# Patient Record
Sex: FEMALE | Race: WHITE | Employment: OTHER
[De-identification: names, ages, dates, MRNs, and addresses within clinical notes are randomized per-mention and may not be internally consistent; named-entity substitution may affect disease eponyms.]

---

## 2017-01-04 ENCOUNTER — OFFICE VISIT (OUTPATIENT)
Dept: PAIN MANAGEMENT | Facility: CLINIC | Age: 62
End: 2017-01-04

## 2017-01-04 VITALS
BODY MASS INDEX: 31.04 KG/M2 | SYSTOLIC BLOOD PRESSURE: 174 MMHG | RESPIRATION RATE: 16 BRPM | HEIGHT: 61 IN | WEIGHT: 164.4 LBS | HEART RATE: 104 BPM | OXYGEN SATURATION: 98 % | DIASTOLIC BLOOD PRESSURE: 105 MMHG

## 2017-01-04 DIAGNOSIS — M54.12 CERVICAL RADICULAR PAIN: Chronic | ICD-10-CM

## 2017-01-04 DIAGNOSIS — Z79.891 CHRONIC USE OF OPIATE DRUGS THERAPEUTIC PURPOSES: ICD-10-CM

## 2017-01-04 DIAGNOSIS — M54.16 LUMBAR RADICULAR PAIN: Primary | Chronic | ICD-10-CM

## 2017-01-04 DIAGNOSIS — Z79.899 CHRONIC PRESCRIPTION BENZODIAZEPINE USE: ICD-10-CM

## 2017-01-04 PROCEDURE — 99214 OFFICE O/P EST MOD 30 MIN: CPT | Performed by: ANESTHESIOLOGY

## 2017-01-04 RX ORDER — HYDROCODONE BITARTRATE AND ACETAMINOPHEN 5; 325 MG/1; MG/1
1 TABLET ORAL EVERY 6 HOURS PRN
Qty: 120 TABLET | Refills: 0 | Status: SHIPPED | OUTPATIENT
Start: 2017-01-08 | End: 2017-02-07 | Stop reason: SDUPTHER

## 2017-01-04 RX ORDER — MELOXICAM 15 MG/1
TABLET ORAL
Qty: 30 TABLET | Refills: 2 | Status: SHIPPED | OUTPATIENT
Start: 2017-01-04 | End: 2017-03-20 | Stop reason: SDUPTHER

## 2017-01-04 ASSESSMENT — ENCOUNTER SYMPTOMS: BACK PAIN: 1

## 2017-01-12 ENCOUNTER — TELEPHONE (OUTPATIENT)
Dept: PAIN MANAGEMENT | Facility: CLINIC | Age: 62
End: 2017-01-12

## 2017-01-24 ENCOUNTER — OFFICE VISIT (OUTPATIENT)
Dept: FAMILY MEDICINE CLINIC | Facility: CLINIC | Age: 62
End: 2017-01-24

## 2017-01-24 VITALS
WEIGHT: 157 LBS | OXYGEN SATURATION: 98 % | HEART RATE: 90 BPM | DIASTOLIC BLOOD PRESSURE: 70 MMHG | SYSTOLIC BLOOD PRESSURE: 120 MMHG | BODY MASS INDEX: 29.66 KG/M2

## 2017-01-24 DIAGNOSIS — F41.9 ANXIETY: ICD-10-CM

## 2017-01-24 DIAGNOSIS — Z11.59 NEED FOR HEPATITIS C SCREENING TEST: ICD-10-CM

## 2017-01-24 DIAGNOSIS — Z00.00 HEALTH CARE MAINTENANCE: ICD-10-CM

## 2017-01-24 DIAGNOSIS — F32.89 OTHER DEPRESSION: Primary | ICD-10-CM

## 2017-01-24 DIAGNOSIS — M54.2 CERVICALGIA: Chronic | ICD-10-CM

## 2017-01-24 DIAGNOSIS — E78.5 DYSLIPIDEMIA: ICD-10-CM

## 2017-01-24 PROCEDURE — 99214 OFFICE O/P EST MOD 30 MIN: CPT | Performed by: NURSE PRACTITIONER

## 2017-01-24 ASSESSMENT — ENCOUNTER SYMPTOMS
DIARRHEA: 0
GASTROINTESTINAL NEGATIVE: 1
COUGH: 0
CONSTIPATION: 0
EYES NEGATIVE: 1
WHEEZING: 0
SHORTNESS OF BREATH: 0
ABDOMINAL PAIN: 0
BACK PAIN: 1
RESPIRATORY NEGATIVE: 1
ALLERGIC/IMMUNOLOGIC NEGATIVE: 1

## 2017-01-31 ENCOUNTER — TELEPHONE (OUTPATIENT)
Dept: FAMILY MEDICINE CLINIC | Facility: CLINIC | Age: 62
End: 2017-01-31

## 2017-01-31 RX ORDER — DOXYCYCLINE HYCLATE 100 MG/1
100 CAPSULE ORAL 2 TIMES DAILY
Qty: 20 CAPSULE | Refills: 0 | Status: SHIPPED | OUTPATIENT
Start: 2017-01-31 | End: 2017-02-10

## 2017-02-07 DIAGNOSIS — M54.16 LUMBAR RADICULAR PAIN: Chronic | ICD-10-CM

## 2017-02-07 RX ORDER — MELOXICAM 15 MG/1
TABLET ORAL
Qty: 30 TABLET | Refills: 2 | Status: CANCELLED | OUTPATIENT
Start: 2017-02-07

## 2017-02-07 RX ORDER — GABAPENTIN 300 MG/1
300 CAPSULE ORAL 3 TIMES DAILY
Qty: 90 CAPSULE | Refills: 2 | Status: SHIPPED | OUTPATIENT
Start: 2017-02-07 | End: 2017-05-03 | Stop reason: SDUPTHER

## 2017-02-07 RX ORDER — HYDROCODONE BITARTRATE AND ACETAMINOPHEN 5; 325 MG/1; MG/1
1 TABLET ORAL EVERY 6 HOURS PRN
Qty: 120 TABLET | Refills: 0 | Status: SHIPPED | OUTPATIENT
Start: 2017-02-08 | End: 2017-05-17 | Stop reason: SDUPTHER

## 2017-02-15 ENCOUNTER — TELEPHONE (OUTPATIENT)
Dept: FAMILY MEDICINE CLINIC | Facility: CLINIC | Age: 62
End: 2017-02-15

## 2017-02-16 ENCOUNTER — TELEPHONE (OUTPATIENT)
Dept: FAMILY MEDICINE CLINIC | Facility: CLINIC | Age: 62
End: 2017-02-16

## 2017-02-24 ENCOUNTER — TELEPHONE (OUTPATIENT)
Dept: FAMILY MEDICINE CLINIC | Facility: CLINIC | Age: 62
End: 2017-02-24

## 2017-03-06 RX ORDER — HYDROCODONE BITARTRATE AND ACETAMINOPHEN 5; 325 MG/1; MG/1
1 TABLET ORAL EVERY 6 HOURS PRN
Qty: 120 TABLET | Refills: 0 | Status: CANCELLED | OUTPATIENT
Start: 2017-03-06 | End: 2017-04-05

## 2017-03-21 RX ORDER — MELOXICAM 15 MG/1
TABLET ORAL
Qty: 30 TABLET | Refills: 2 | Status: SHIPPED | OUTPATIENT
Start: 2017-03-21 | End: 2017-03-22 | Stop reason: SDUPTHER

## 2017-03-22 RX ORDER — MELOXICAM 15 MG/1
TABLET ORAL
Qty: 30 TABLET | Refills: 3 | Status: SHIPPED | OUTPATIENT
Start: 2017-03-22 | End: 2017-06-13

## 2017-05-03 DIAGNOSIS — M54.16 LUMBAR RADICULAR PAIN: Chronic | ICD-10-CM

## 2017-05-04 RX ORDER — GABAPENTIN 300 MG/1
CAPSULE ORAL
Qty: 90 CAPSULE | Refills: 1 | Status: SHIPPED | OUTPATIENT
Start: 2017-05-04 | End: 2017-07-10 | Stop reason: SDUPTHER

## 2017-05-08 RX ORDER — BISACODYL 5 MG
TABLET, DELAYED RELEASE (ENTERIC COATED) ORAL
Qty: 60 TABLET | Refills: 5 | Status: SHIPPED | OUTPATIENT
Start: 2017-05-08 | End: 2017-08-17 | Stop reason: ALTCHOICE

## 2017-05-08 RX ORDER — DOCUSATE SODIUM 100 MG/1
CAPSULE ORAL
Qty: 60 CAPSULE | Refills: 5 | Status: SHIPPED | OUTPATIENT
Start: 2017-05-08 | End: 2017-08-17 | Stop reason: ALTCHOICE

## 2017-05-17 ENCOUNTER — OFFICE VISIT (OUTPATIENT)
Dept: FAMILY MEDICINE CLINIC | Age: 62
End: 2017-05-17
Payer: MEDICARE

## 2017-05-17 ENCOUNTER — OFFICE VISIT (OUTPATIENT)
Dept: PAIN MANAGEMENT | Age: 62
End: 2017-05-17
Payer: MEDICARE

## 2017-05-17 VITALS
RESPIRATION RATE: 20 BRPM | BODY MASS INDEX: 29.63 KG/M2 | SYSTOLIC BLOOD PRESSURE: 125 MMHG | HEIGHT: 62 IN | WEIGHT: 161 LBS | TEMPERATURE: 97.4 F | OXYGEN SATURATION: 95 % | HEART RATE: 99 BPM | DIASTOLIC BLOOD PRESSURE: 83 MMHG

## 2017-05-17 VITALS
HEART RATE: 105 BPM | SYSTOLIC BLOOD PRESSURE: 142 MMHG | HEIGHT: 62 IN | RESPIRATION RATE: 18 BRPM | BODY MASS INDEX: 29.44 KG/M2 | WEIGHT: 160 LBS | OXYGEN SATURATION: 97 % | DIASTOLIC BLOOD PRESSURE: 74 MMHG

## 2017-05-17 DIAGNOSIS — J44.9 CHRONIC OBSTRUCTIVE PULMONARY DISEASE, UNSPECIFIED COPD TYPE (HCC): ICD-10-CM

## 2017-05-17 DIAGNOSIS — Z79.891 CHRONIC USE OF OPIATE DRUGS THERAPEUTIC PURPOSES: ICD-10-CM

## 2017-05-17 DIAGNOSIS — I10 ESSENTIAL HYPERTENSION: Primary | ICD-10-CM

## 2017-05-17 DIAGNOSIS — Z98.890 STATUS POST LUMBAR SURGERY: ICD-10-CM

## 2017-05-17 DIAGNOSIS — G25.81 RLS (RESTLESS LEGS SYNDROME): ICD-10-CM

## 2017-05-17 DIAGNOSIS — Z79.899 CHRONIC PRESCRIPTION BENZODIAZEPINE USE: ICD-10-CM

## 2017-05-17 DIAGNOSIS — M54.12 CERVICAL RADICULAR PAIN: Primary | Chronic | ICD-10-CM

## 2017-05-17 PROCEDURE — 99214 OFFICE O/P EST MOD 30 MIN: CPT | Performed by: ANESTHESIOLOGY

## 2017-05-17 PROCEDURE — 99214 OFFICE O/P EST MOD 30 MIN: CPT | Performed by: NURSE PRACTITIONER

## 2017-05-17 RX ORDER — HYDROCODONE BITARTRATE AND ACETAMINOPHEN 10; 325 MG/1; MG/1
TABLET ORAL EVERY 6 HOURS PRN
COMMUNITY
Start: 2017-04-18 | End: 2017-05-17 | Stop reason: DRUGHIGH

## 2017-05-17 RX ORDER — ALPRAZOLAM 1 MG/1
TABLET ORAL
COMMUNITY
Start: 2017-05-11 | End: 2017-10-02 | Stop reason: ALTCHOICE

## 2017-05-17 RX ORDER — HYDROCODONE BITARTRATE AND ACETAMINOPHEN 5; 325 MG/1; MG/1
1 TABLET ORAL EVERY 6 HOURS PRN
Qty: 120 TABLET | Refills: 0 | Status: SHIPPED | OUTPATIENT
Start: 2017-05-17 | End: 2017-06-13 | Stop reason: SDUPTHER

## 2017-05-17 ASSESSMENT — ENCOUNTER SYMPTOMS
BACK PAIN: 1
WHEEZING: 0
SHORTNESS OF BREATH: 1
BACK PAIN: 1
ABDOMINAL PAIN: 0
DIARRHEA: 0
GASTROINTESTINAL NEGATIVE: 1
ALLERGIC/IMMUNOLOGIC NEGATIVE: 1
SHORTNESS OF BREATH: 0
EYES NEGATIVE: 1
GASTROINTESTINAL NEGATIVE: 1
CONSTIPATION: 0
COUGH: 1

## 2017-05-17 ASSESSMENT — PATIENT HEALTH QUESTIONNAIRE - PHQ9
SUM OF ALL RESPONSES TO PHQ9 QUESTIONS 1 & 2: 0
1. LITTLE INTEREST OR PLEASURE IN DOING THINGS: 0
SUM OF ALL RESPONSES TO PHQ QUESTIONS 1-9: 0
2. FEELING DOWN, DEPRESSED OR HOPELESS: 0

## 2017-05-27 RX ORDER — DULOXETIN HYDROCHLORIDE 60 MG/1
60 CAPSULE, DELAYED RELEASE ORAL DAILY
Qty: 30 CAPSULE | Refills: 5 | Status: SHIPPED | OUTPATIENT
Start: 2017-05-27

## 2017-05-27 RX ORDER — BUPROPION HYDROCHLORIDE 150 MG/1
150 TABLET ORAL 2 TIMES DAILY
Qty: 60 TABLET | Refills: 5 | Status: SHIPPED | OUTPATIENT
Start: 2017-05-27 | End: 2017-12-06 | Stop reason: ALTCHOICE

## 2017-05-27 RX ORDER — DULOXETIN HYDROCHLORIDE 30 MG/1
30 CAPSULE, DELAYED RELEASE ORAL DAILY
Qty: 30 CAPSULE | Refills: 5 | Status: SHIPPED
Start: 2017-05-27 | End: 2020-10-14 | Stop reason: DRUGHIGH

## 2017-06-05 DIAGNOSIS — J44.9 CHRONIC OBSTRUCTIVE PULMONARY DISEASE, UNSPECIFIED COPD TYPE (HCC): ICD-10-CM

## 2017-06-06 RX ORDER — BUDESONIDE AND FORMOTEROL FUMARATE DIHYDRATE 160; 4.5 UG/1; UG/1
AEROSOL RESPIRATORY (INHALATION)
Qty: 10.2 G | Refills: 5 | Status: SHIPPED | OUTPATIENT
Start: 2017-06-06 | End: 2017-11-18 | Stop reason: SDUPTHER

## 2017-06-06 RX ORDER — IPRATROPIUM BROMIDE AND ALBUTEROL SULFATE 2.5; .5 MG/3ML; MG/3ML
SOLUTION RESPIRATORY (INHALATION)
Qty: 360 ML | Refills: 1 | Status: SHIPPED | OUTPATIENT
Start: 2017-06-06 | End: 2017-08-01 | Stop reason: SDUPTHER

## 2017-06-13 RX ORDER — CETIRIZINE HYDROCHLORIDE 10 MG/1
TABLET ORAL
Qty: 30 TABLET | Refills: 5 | Status: SHIPPED | OUTPATIENT
Start: 2017-06-13 | End: 2017-11-18 | Stop reason: SDUPTHER

## 2017-06-13 RX ORDER — MELOXICAM 15 MG/1
TABLET ORAL
Qty: 30 TABLET | Refills: 5 | Status: SHIPPED | OUTPATIENT
Start: 2017-06-13 | End: 2017-12-01 | Stop reason: SDUPTHER

## 2017-06-13 RX ORDER — OMEPRAZOLE 20 MG/1
CAPSULE, DELAYED RELEASE ORAL
Qty: 30 CAPSULE | Refills: 5 | Status: SHIPPED | OUTPATIENT
Start: 2017-06-13 | End: 2017-08-04 | Stop reason: DRUGHIGH

## 2017-06-14 RX ORDER — HYDROCODONE BITARTRATE AND ACETAMINOPHEN 5; 325 MG/1; MG/1
1 TABLET ORAL EVERY 8 HOURS PRN
Qty: 90 TABLET | Refills: 0 | Status: SHIPPED | OUTPATIENT
Start: 2017-06-14 | End: 2017-08-04 | Stop reason: SDUPTHER

## 2017-06-14 RX ORDER — HYDROCODONE BITARTRATE AND ACETAMINOPHEN 5; 325 MG/1; MG/1
1 TABLET ORAL EVERY 8 HOURS PRN
Qty: 90 TABLET | Refills: 0 | Status: SHIPPED | OUTPATIENT
Start: 2017-06-14 | End: 2017-06-14 | Stop reason: SDUPTHER

## 2017-06-19 ENCOUNTER — TELEPHONE (OUTPATIENT)
Dept: PAIN MANAGEMENT | Age: 62
End: 2017-06-19

## 2017-07-10 DIAGNOSIS — M54.16 LUMBAR RADICULAR PAIN: Chronic | ICD-10-CM

## 2017-07-10 RX ORDER — GABAPENTIN 300 MG/1
CAPSULE ORAL
Qty: 90 CAPSULE | Refills: 1 | Status: SHIPPED | OUTPATIENT
Start: 2017-07-10 | End: 2017-09-20 | Stop reason: SDUPTHER

## 2017-07-21 DIAGNOSIS — R10.30 LOWER ABDOMINAL PAIN: ICD-10-CM

## 2017-07-21 DIAGNOSIS — R14.0 BLOATING: ICD-10-CM

## 2017-07-21 DIAGNOSIS — K21.9 GASTROESOPHAGEAL REFLUX DISEASE, ESOPHAGITIS PRESENCE NOT SPECIFIED: Primary | ICD-10-CM

## 2017-07-26 ENCOUNTER — TELEPHONE (OUTPATIENT)
Dept: GASTROENTEROLOGY | Age: 62
End: 2017-07-26

## 2017-08-02 RX ORDER — IPRATROPIUM BROMIDE AND ALBUTEROL SULFATE 2.5; .5 MG/3ML; MG/3ML
SOLUTION RESPIRATORY (INHALATION)
Qty: 360 ML | Refills: 5 | Status: SHIPPED | OUTPATIENT
Start: 2017-08-02 | End: 2018-01-12 | Stop reason: SDUPTHER

## 2017-08-04 ENCOUNTER — OFFICE VISIT (OUTPATIENT)
Dept: PAIN MANAGEMENT | Age: 62
End: 2017-08-04
Payer: MEDICARE

## 2017-08-04 VITALS
WEIGHT: 162 LBS | OXYGEN SATURATION: 98 % | BODY MASS INDEX: 29.81 KG/M2 | DIASTOLIC BLOOD PRESSURE: 79 MMHG | HEIGHT: 62 IN | SYSTOLIC BLOOD PRESSURE: 129 MMHG | RESPIRATION RATE: 16 BRPM | TEMPERATURE: 97 F | HEART RATE: 103 BPM

## 2017-08-04 DIAGNOSIS — M54.16 LEFT LUMBAR RADICULITIS: ICD-10-CM

## 2017-08-04 DIAGNOSIS — Z98.1 HX OF FUSION OF CERVICAL SPINE: ICD-10-CM

## 2017-08-04 DIAGNOSIS — M47.812 SPONDYLOSIS OF CERVICAL REGION WITHOUT MYELOPATHY OR RADICULOPATHY: Chronic | ICD-10-CM

## 2017-08-04 DIAGNOSIS — M25.562 CHRONIC PAIN OF LEFT KNEE: ICD-10-CM

## 2017-08-04 DIAGNOSIS — M47.816 SPONDYLOSIS OF LUMBAR REGION WITHOUT MYELOPATHY OR RADICULOPATHY: Primary | ICD-10-CM

## 2017-08-04 DIAGNOSIS — Z79.899 CHRONIC PRESCRIPTION BENZODIAZEPINE USE: ICD-10-CM

## 2017-08-04 DIAGNOSIS — G89.29 CHRONIC PAIN OF LEFT KNEE: ICD-10-CM

## 2017-08-04 DIAGNOSIS — Z79.891 CHRONIC USE OF OPIATE DRUGS THERAPEUTIC PURPOSES: ICD-10-CM

## 2017-08-04 PROCEDURE — 99215 OFFICE O/P EST HI 40 MIN: CPT | Performed by: ANESTHESIOLOGY

## 2017-08-04 RX ORDER — HYDROCODONE BITARTRATE AND ACETAMINOPHEN 5; 325 MG/1; MG/1
1 TABLET ORAL EVERY 8 HOURS PRN
Qty: 90 TABLET | Refills: 0 | Status: SHIPPED | OUTPATIENT
Start: 2017-08-04 | End: 2017-09-11 | Stop reason: SDUPTHER

## 2017-08-04 RX ORDER — BUPRENORPHINE 10 UG/H
1 PATCH TRANSDERMAL WEEKLY
Qty: 4 PATCH | Refills: 0 | Status: SHIPPED | OUTPATIENT
Start: 2017-08-04 | End: 2017-09-01

## 2017-08-04 RX ORDER — OMEPRAZOLE 40 MG/1
CAPSULE, DELAYED RELEASE ORAL DAILY
COMMUNITY
Start: 2017-07-13 | End: 2019-03-20

## 2017-08-04 RX ORDER — NIFEDIPINE 60 MG/1
60 TABLET, EXTENDED RELEASE ORAL 2 TIMES DAILY
COMMUNITY
Start: 2017-07-12 | End: 2019-03-18 | Stop reason: SDUPTHER

## 2017-08-04 ASSESSMENT — ENCOUNTER SYMPTOMS: BACK PAIN: 1

## 2017-08-17 ENCOUNTER — OFFICE VISIT (OUTPATIENT)
Dept: FAMILY MEDICINE CLINIC | Age: 62
End: 2017-08-17
Payer: MEDICARE

## 2017-08-17 VITALS
OXYGEN SATURATION: 98 % | HEIGHT: 62 IN | HEART RATE: 104 BPM | DIASTOLIC BLOOD PRESSURE: 90 MMHG | SYSTOLIC BLOOD PRESSURE: 141 MMHG | WEIGHT: 160.2 LBS | BODY MASS INDEX: 29.48 KG/M2 | TEMPERATURE: 97.1 F

## 2017-08-17 DIAGNOSIS — J44.1 COPD WITH ACUTE EXACERBATION (HCC): Primary | ICD-10-CM

## 2017-08-17 PROCEDURE — 99213 OFFICE O/P EST LOW 20 MIN: CPT | Performed by: INTERNAL MEDICINE

## 2017-08-17 RX ORDER — PREDNISONE 10 MG/1
TABLET ORAL
Qty: 30 TABLET | Refills: 0 | Status: SHIPPED | OUTPATIENT
Start: 2017-08-17 | End: 2017-08-27

## 2017-08-17 RX ORDER — AZITHROMYCIN 250 MG/1
TABLET, FILM COATED ORAL
Qty: 1 PACKET | Refills: 0 | Status: ON HOLD | OUTPATIENT
Start: 2017-08-17 | End: 2017-09-25 | Stop reason: ALTCHOICE

## 2017-08-17 RX ORDER — GUAIFENESIN 600 MG/1
600 TABLET, EXTENDED RELEASE ORAL 2 TIMES DAILY
Qty: 10 TABLET | Refills: 0 | Status: SHIPPED | OUTPATIENT
Start: 2017-08-17 | End: 2017-08-22

## 2017-08-22 ENCOUNTER — TELEPHONE (OUTPATIENT)
Dept: PAIN MANAGEMENT | Age: 62
End: 2017-08-22

## 2017-08-23 ENCOUNTER — OFFICE VISIT (OUTPATIENT)
Dept: FAMILY MEDICINE CLINIC | Age: 62
End: 2017-08-23
Payer: MEDICARE

## 2017-08-23 VITALS
WEIGHT: 154 LBS | SYSTOLIC BLOOD PRESSURE: 144 MMHG | HEART RATE: 100 BPM | TEMPERATURE: 97.8 F | BODY MASS INDEX: 28.34 KG/M2 | HEIGHT: 62 IN | OXYGEN SATURATION: 97 % | RESPIRATION RATE: 18 BRPM | DIASTOLIC BLOOD PRESSURE: 92 MMHG

## 2017-08-23 DIAGNOSIS — J44.1 COPD EXACERBATION (HCC): Primary | ICD-10-CM

## 2017-08-23 PROCEDURE — 99213 OFFICE O/P EST LOW 20 MIN: CPT | Performed by: NURSE PRACTITIONER

## 2017-08-23 RX ORDER — BENZONATATE 100 MG/1
100 CAPSULE ORAL 3 TIMES DAILY PRN
Qty: 15 CAPSULE | Refills: 0 | Status: SHIPPED | OUTPATIENT
Start: 2017-08-23 | End: 2017-08-30

## 2017-08-23 RX ORDER — CEFTRIAXONE SODIUM 250 MG/1
250 INJECTION, POWDER, FOR SOLUTION INTRAMUSCULAR; INTRAVENOUS ONCE
Status: COMPLETED | OUTPATIENT
Start: 2017-08-23 | End: 2017-08-23

## 2017-08-23 RX ADMIN — CEFTRIAXONE SODIUM 250 MG: 250 INJECTION, POWDER, FOR SOLUTION INTRAMUSCULAR; INTRAVENOUS at 12:46

## 2017-08-23 ASSESSMENT — ENCOUNTER SYMPTOMS
CONSTIPATION: 0
RHINORRHEA: 0
ABDOMINAL PAIN: 0
BLOOD IN STOOL: 0
SHORTNESS OF BREATH: 0
COUGH: 1
EYE DISCHARGE: 0
CHEST TIGHTNESS: 0
DIARRHEA: 0
WHEEZING: 1
SINUS PRESSURE: 0

## 2017-09-11 ENCOUNTER — TELEPHONE (OUTPATIENT)
Dept: PAIN MANAGEMENT | Age: 62
End: 2017-09-11

## 2017-09-13 RX ORDER — HYDROCODONE BITARTRATE AND ACETAMINOPHEN 5; 325 MG/1; MG/1
1 TABLET ORAL EVERY 8 HOURS PRN
Qty: 90 TABLET | Refills: 0 | Status: SHIPPED | OUTPATIENT
Start: 2017-09-13 | End: 2017-10-11 | Stop reason: SDUPTHER

## 2017-09-20 ENCOUNTER — TELEPHONE (OUTPATIENT)
Dept: PAIN MANAGEMENT | Age: 62
End: 2017-09-20

## 2017-09-20 DIAGNOSIS — M54.16 LUMBAR RADICULAR PAIN: Chronic | ICD-10-CM

## 2017-09-20 RX ORDER — GABAPENTIN 300 MG/1
CAPSULE ORAL
Qty: 90 CAPSULE | Refills: 1 | Status: SHIPPED | OUTPATIENT
Start: 2017-09-20 | End: 2017-11-13 | Stop reason: SDUPTHER

## 2017-09-20 RX ORDER — GABAPENTIN 300 MG/1
CAPSULE ORAL
Qty: 90 CAPSULE | OUTPATIENT
Start: 2017-09-20

## 2017-09-20 RX ORDER — GABAPENTIN 300 MG/1
CAPSULE ORAL
Qty: 90 CAPSULE | Refills: 1 | Status: CANCELLED | OUTPATIENT
Start: 2017-09-20

## 2017-09-25 ENCOUNTER — HOSPITAL ENCOUNTER (OUTPATIENT)
Age: 62
Setting detail: OUTPATIENT SURGERY
Discharge: HOME | End: 2017-09-25
Attending: ANESTHESIOLOGY | Admitting: ANESTHESIOLOGY
Payer: MEDICARE

## 2017-09-25 ENCOUNTER — APPOINTMENT (OUTPATIENT)
Dept: GENERAL RADIOLOGY | Age: 62
End: 2017-09-25
Attending: ANESTHESIOLOGY
Payer: MEDICARE

## 2017-09-25 VITALS
HEIGHT: 61 IN | HEART RATE: 90 BPM | WEIGHT: 159.61 LBS | OXYGEN SATURATION: 96 % | TEMPERATURE: 97.5 F | SYSTOLIC BLOOD PRESSURE: 126 MMHG | RESPIRATION RATE: 18 BRPM | DIASTOLIC BLOOD PRESSURE: 76 MMHG | BODY MASS INDEX: 30.14 KG/M2

## 2017-09-25 PROCEDURE — 6360000002 HC RX W HCPCS: Performed by: ANESTHESIOLOGY

## 2017-09-25 PROCEDURE — 99152 MOD SED SAME PHYS/QHP 5/>YRS: CPT | Performed by: ANESTHESIOLOGY

## 2017-09-25 PROCEDURE — 64484 NJX AA&/STRD TFRM EPI L/S EA: CPT | Performed by: ANESTHESIOLOGY

## 2017-09-25 PROCEDURE — 7100000010 HC PHASE II RECOVERY - FIRST 15 MIN: Performed by: ANESTHESIOLOGY

## 2017-09-25 PROCEDURE — 64483 NJX AA&/STRD TFRM EPI L/S 1: CPT | Performed by: ANESTHESIOLOGY

## 2017-09-25 PROCEDURE — 7100000011 HC PHASE II RECOVERY - ADDTL 15 MIN: Performed by: ANESTHESIOLOGY

## 2017-09-25 PROCEDURE — 3209999900 FLUORO FOR SURGICAL PROCEDURES

## 2017-09-25 PROCEDURE — 6360000004 HC RX CONTRAST MEDICATION: Performed by: ANESTHESIOLOGY

## 2017-09-25 PROCEDURE — 2500000003 HC RX 250 WO HCPCS: Performed by: ANESTHESIOLOGY

## 2017-09-25 PROCEDURE — 3600000050 HC PAIN LEVEL 1 BASE: Performed by: ANESTHESIOLOGY

## 2017-09-25 PROCEDURE — 2580000003 HC RX 258: Performed by: ANESTHESIOLOGY

## 2017-09-25 RX ORDER — FENTANYL CITRATE 50 UG/ML
INJECTION, SOLUTION INTRAMUSCULAR; INTRAVENOUS PRN
Status: DISCONTINUED | OUTPATIENT
Start: 2017-09-25 | End: 2017-09-25 | Stop reason: HOSPADM

## 2017-09-25 RX ORDER — MIDAZOLAM HYDROCHLORIDE 1 MG/ML
INJECTION INTRAMUSCULAR; INTRAVENOUS PRN
Status: DISCONTINUED | OUTPATIENT
Start: 2017-09-25 | End: 2017-09-25 | Stop reason: HOSPADM

## 2017-09-25 RX ORDER — LIDOCAINE HYDROCHLORIDE 10 MG/ML
INJECTION, SOLUTION INFILTRATION; PERINEURAL PRN
Status: DISCONTINUED | OUTPATIENT
Start: 2017-09-25 | End: 2017-09-25 | Stop reason: HOSPADM

## 2017-09-25 RX ORDER — SODIUM CHLORIDE 0.9 % (FLUSH) 0.9 %
10 SYRINGE (ML) INJECTION PRN
Status: DISCONTINUED | OUTPATIENT
Start: 2017-09-25 | End: 2017-09-25 | Stop reason: HOSPADM

## 2017-09-25 RX ORDER — TRIAMCINOLONE ACETONIDE 40 MG/ML
INJECTION, SUSPENSION INTRA-ARTICULAR; INTRAMUSCULAR PRN
Status: DISCONTINUED | OUTPATIENT
Start: 2017-09-25 | End: 2017-09-25 | Stop reason: HOSPADM

## 2017-09-25 RX ADMIN — Medication 10 ML: at 09:51

## 2017-09-25 ASSESSMENT — PAIN DESCRIPTION - ORIENTATION
ORIENTATION: LOWER

## 2017-09-25 ASSESSMENT — PAIN SCALES - GENERAL
PAINLEVEL_OUTOF10: 6
PAINLEVEL_OUTOF10: 6
PAINLEVEL_OUTOF10: 2
PAINLEVEL_OUTOF10: 3
PAINLEVEL_OUTOF10: 3
PAINLEVEL_OUTOF10: 6

## 2017-09-25 ASSESSMENT — PAIN DESCRIPTION - LOCATION
LOCATION: BACK

## 2017-09-25 ASSESSMENT — PAIN DESCRIPTION - DESCRIPTORS: DESCRIPTORS: BURNING;ACHING

## 2017-09-25 ASSESSMENT — PAIN - FUNCTIONAL ASSESSMENT: PAIN_FUNCTIONAL_ASSESSMENT: 0-10

## 2017-10-02 ENCOUNTER — OFFICE VISIT (OUTPATIENT)
Dept: FAMILY MEDICINE CLINIC | Age: 62
End: 2017-10-02
Payer: MEDICARE

## 2017-10-02 VITALS
BODY MASS INDEX: 30.21 KG/M2 | HEART RATE: 95 BPM | SYSTOLIC BLOOD PRESSURE: 120 MMHG | OXYGEN SATURATION: 98 % | RESPIRATION RATE: 16 BRPM | HEIGHT: 61 IN | DIASTOLIC BLOOD PRESSURE: 78 MMHG | WEIGHT: 160 LBS

## 2017-10-02 DIAGNOSIS — Z23 IMMUNIZATION DUE: ICD-10-CM

## 2017-10-02 DIAGNOSIS — J44.9 CHRONIC OBSTRUCTIVE PULMONARY DISEASE, UNSPECIFIED COPD TYPE (HCC): Primary | ICD-10-CM

## 2017-10-02 DIAGNOSIS — R59.9 SWOLLEN LYMPH NODES: ICD-10-CM

## 2017-10-02 PROCEDURE — 99213 OFFICE O/P EST LOW 20 MIN: CPT | Performed by: NURSE PRACTITIONER

## 2017-10-02 PROCEDURE — 90688 IIV4 VACCINE SPLT 0.5 ML IM: CPT | Performed by: NURSE PRACTITIONER

## 2017-10-02 PROCEDURE — 90472 IMMUNIZATION ADMIN EACH ADD: CPT | Performed by: NURSE PRACTITIONER

## 2017-10-02 PROCEDURE — 90471 IMMUNIZATION ADMIN: CPT | Performed by: NURSE PRACTITIONER

## 2017-10-02 RX ORDER — FLUTICASONE PROPIONATE 220 UG/1
2 AEROSOL, METERED RESPIRATORY (INHALATION) 2 TIMES DAILY
Qty: 1 INHALER | Refills: 3 | Status: SHIPPED | OUTPATIENT
Start: 2017-10-02 | End: 2018-01-12 | Stop reason: SDUPTHER

## 2017-10-02 ASSESSMENT — ENCOUNTER SYMPTOMS
CONSTIPATION: 0
NAUSEA: 0
SHORTNESS OF BREATH: 1
CHEST TIGHTNESS: 1
ALLERGIC/IMMUNOLOGIC NEGATIVE: 1
DIARRHEA: 0
ABDOMINAL PAIN: 0
WHEEZING: 0
EYES NEGATIVE: 1
COUGH: 1

## 2017-10-02 NOTE — MR AVS SNAPSHOT
HYDROcodone-acetaminophen (NORCO) 5-325 MG per tablet Take 1 tablet by mouth every 8 hours as needed for Pain (DO NOT FILL BEFORE 09/16/2017) . tiotropium (SPIRIVA HANDIHALER) 18 MCG inhalation capsule Inhale 1 capsule into the lungs daily    omeprazole (PRILOSEC) 40 MG delayed release capsule     NIFEdipine (PROCARDIA XL) 60 MG extended release tablet Take 60 mg by mouth 2 times daily     ipratropium-albuterol (DUONEB) 0.5-2.5 (3) MG/3ML SOLN nebulizer solution INHALE THE CONTENTS OF 1 VIAL VIA NEBULIZER EVERY 6 HOURS AS NEEDED FOR SHORTNESS OF BREATH    cetirizine (ZYRTEC) 10 MG tablet TAKE 1 TABLET BY MOUTH DAILY    meloxicam (MOBIC) 15 MG tablet TAKE 1 TABLET BY MOUTH DAILY    VENTOLIN  (90 BASE) MCG/ACT inhaler INHALE 2 PUFFS EVERY 6 HOURS AS NEEDED FOR WHEEZING    SYMBICORT 160-4.5 MCG/ACT AERO INHALE 2 PUFFS INTO THE LUNGS TWICE DAILY **RINSE MOUTH AFTER EACH USE**    buPROPion (WELLBUTRIN XL) 150 MG extended release tablet Take 1 tablet by mouth 2 times daily    DULoxetine (CYMBALTA) 30 MG extended release capsule Take 1 capsule by mouth daily    DULoxetine (CYMBALTA) 60 MG extended release capsule Take 1 capsule by mouth daily    ranitidine (ZANTAC) 300 MG tablet TAKE 1 TABLET BY MOUTH NIGHTLY    mometasone (ELOCON) 0.1 % cream APPLY TOPICALLY DAILY    rOPINIRole (REQUIP) 3 MG tablet 1 tablet nightly    levalbuterol (XOPENEX HFA) 45 MCG/ACT inhaler Inhale 1 puff into the lungs every 4 hours as needed for Wheezing    fluticasone (FLONASE) 50 MCG/ACT nasal spray INSTILL 1 SPRAY IN EACH NOSTRIL DAILY    losartan (COZAAR) 100 MG tablet Take 1 tablet by mouth daily    aspirin 81 MG tablet Take 81 mg by mouth daily    Nebulizers (COMPRESSOR/NEBULIZER) MISC Use with albuterol solution every 6 hours as needed.       Allergies           No Known Allergies      We Ordered/Performed the following           INFLUENZA, QUADV, 3 YRS AND OLDER, IM, MDV, 0.5ML (Dolores Lopez)          Additional Information Basic Information     Date Of Birth Sex Race Ethnicity Preferred Language    1955 Female White Non-/Non  English      Problem List as of 10/2/2017  Date Reviewed: 8/4/2017                Hx of fusion of cervical spine    RLS (restless legs syndrome)    Chronic prescription benzodiazepine use    Lower abdominal pain    Bloating    Left lumbar radiculitis (Chronic)    Chronic sacroiliac joint pain    Cervical spondylosis (Chronic)    Diverticulosis of colon    Lack of concentration    MI, old    Cervical stenosis of spine (Chronic)    Chronic use of opiate drugs therapeutic purposes    Lumbar spondylosis (Chronic)    Lumbar disc herniation with radiculopathy (Chronic)    Lumbar facet arthropathy (HCC) (Chronic)    Bulge of lumbar disc without myelopathy (Chronic)    Lumbar radicular pain (Chronic)    Cervical radicular pain (Chronic)    Foraminal stenosis of cervical region (Chronic)    H/O cervical spine surgery    Cervicalgia (Chronic)    Arm numbness (Chronic)    COPD (chronic obstructive pulmonary disease) (HCC)    HTN (hypertension)    Dyslipidemia    GERD (gastroesophageal reflux disease)    CAD (coronary artery disease)    Depression    Anxiety      Immunizations as of 10/2/2017     Name Date    Influenza, Intradermal, Preservative free 1/5/2016    Influenza, Luevenia Mace, 3 Years and older, IM 10/2/2017    Influenza, Luevenia Mace, 3 yrs and older, IM, Preservative Free 10/6/2016    Pneumococcal Polysaccharide ([id number]) 11/24/2015      Preventive Care        Date Due    Zoster Vaccine 1/16/2016    Tetanus Combination Vaccine (1 - Tdap) 1/24/2018 (Originally 1/16/1975)    Mammograms are recommended every 2 years for low/average risk patients aged 48 - 69, and every year for high risk patients per updated national guidelines. However these guidelines can be individualized by your provider.  11/28/2018    Diabetes Screening 1/24/2020    Pap Smear 10/6/2021    Cholesterol Screening 1/24/2022

## 2017-10-02 NOTE — PROGRESS NOTES
Visit Information    Have you changed or started any medications since your last visit including any over-the-counter medicines, vitamins, or herbal medicines? no   Are you having any side effects from any of your medications? -  no  Have you stopped taking any of your medications? Is so, why? -  no    Have you seen any other physician or provider since your last visit? Yes - Records Requested  Have you had any other diagnostic tests since your last visit? No  Have you been seen in the emergency room and/or had an admission to a hospital since we last saw you? No  Have you had your routine dental cleaning in the past 6 months? no    Have you activated your [employer] account? If not, what are your barriers?  Yes     Patient Care Team:  Derrick Gomez NP as PCP - General (Nurse Practitioner)  Dayanara Edwards MD as Consulting Physician (Pain Management)  Aniyah Salcido MD as Surgeon (Cardiology)  Karly Pfeiffer MD as Consulting Physician (Pulmonology)  Dinora Aj CNP as Nurse Practitioner (Nurse Practitioner)    Medical History Review  Past Medical, Family, and Social History reviewed and does contribute to the patient presenting condition    Health Maintenance   Topic Date Due    Zostavax vaccine  01/16/2016    Flu vaccine (1) 09/01/2017    DTaP/Tdap/Td vaccine (1 - Tdap) 01/24/2018 (Originally 1[birth year]16/1974)    Breast cancer screen  11/28/2018    Diabetes screen  01/24/2020    Cervical cancer screen  10/06/2021    Lipid screen  01/24/2022    Colon cancer screen colonoscopy  05/16/2022    Pneumococcal med risk  Completed    Hepatitis C screen  Completed    HIV screen  Completed
weakness, numbness and headaches. Hematological: Negative. Psychiatric/Behavioral: Negative. Negative for sleep disturbance. The patient is not nervous/anxious. Objective:     /78 (Site: Right Arm, Position: Sitting, Cuff Size: Large Adult)  Pulse 95  Resp 16  Ht 5' 1.02\" (1.55 m)  Wt 160 lb (72.6 kg)  SpO2 98%  BMI 30.21 kg/m2  Physical Exam   Constitutional: She is oriented to person, place, and time. Vital signs are normal. She appears well-developed and well-nourished. HENT:   Head: Atraumatic. Nose: Nose normal.   Mouth/Throat: Oropharynx is clear and moist. No oropharyngeal exudate, posterior oropharyngeal edema or posterior oropharyngeal erythema. Eyes: Conjunctivae are normal. Right eye exhibits no discharge. Left eye exhibits no discharge. Neck: Normal range of motion. Neck supple. No thyromegaly present. Cardiovascular: Regular rhythm and normal heart sounds. Tachycardia present. Exam reveals no gallop and no friction rub. No murmur heard. Pulmonary/Chest: Effort normal. No accessory muscle usage. No respiratory distress. She has no wheezes. She has no rhonchi. She has no rales. Abdominal: Soft. Bowel sounds are normal. She exhibits no distension. There is no tenderness. Musculoskeletal: Normal range of motion. She exhibits no edema. Neurological: She is alert and oriented to person, place, and time. Coordination normal.   Skin: Skin is warm and dry. No rash noted. No erythema. Psychiatric: She has a normal mood and affect. Her behavior is normal. Judgment and thought content normal.   Vitals reviewed. Assessment:      1. Chronic obstructive pulmonary disease, unspecified COPD type ([address]    2. Immunization due    3. Swollen lymph nodes                     Plan:         1. Chronic obstructive pulmonary disease, unspecified COPD type ([address]  STOP symbicort and spiriva respimat    START:  - Tiotropium Bromide-Olodaterol 2.5-2.5 MCG/ACT AERS;  Inhale 2

## 2017-10-03 ENCOUNTER — TELEPHONE (OUTPATIENT)
Dept: FAMILY MEDICINE CLINIC | Age: 62
End: 2017-10-03

## 2017-10-05 RX ORDER — VARENICLINE TARTRATE 25 MG
KIT ORAL
Qty: 1 EACH | Refills: 0 | COMMUNITY
Start: 2017-10-05 | End: 2018-04-03 | Stop reason: ALTCHOICE

## 2017-10-11 ENCOUNTER — OFFICE VISIT (OUTPATIENT)
Dept: PAIN MANAGEMENT | Age: 62
End: 2017-10-11
Payer: MEDICARE

## 2017-10-11 VITALS
HEART RATE: 86 BPM | BODY MASS INDEX: 29.96 KG/M2 | HEIGHT: 62 IN | DIASTOLIC BLOOD PRESSURE: 89 MMHG | RESPIRATION RATE: 16 BRPM | OXYGEN SATURATION: 95 % | SYSTOLIC BLOOD PRESSURE: 139 MMHG | WEIGHT: 162.8 LBS

## 2017-10-11 DIAGNOSIS — Z79.891 CHRONIC USE OF OPIATE DRUGS THERAPEUTIC PURPOSES: Primary | ICD-10-CM

## 2017-10-11 DIAGNOSIS — Z98.890 H/O CERVICAL SPINE SURGERY: ICD-10-CM

## 2017-10-11 DIAGNOSIS — M47.812 SPONDYLOSIS OF CERVICAL REGION WITHOUT MYELOPATHY OR RADICULOPATHY: Chronic | ICD-10-CM

## 2017-10-11 DIAGNOSIS — Z79.899 CHRONIC PRESCRIPTION BENZODIAZEPINE USE: ICD-10-CM

## 2017-10-11 DIAGNOSIS — M51.16 LUMBAR DISC HERNIATION WITH RADICULOPATHY: Chronic | ICD-10-CM

## 2017-10-11 PROCEDURE — 99214 OFFICE O/P EST MOD 30 MIN: CPT | Performed by: ANESTHESIOLOGY

## 2017-10-11 RX ORDER — ALPRAZOLAM 1 MG/1
TABLET ORAL PRN
COMMUNITY
Start: 2017-10-10

## 2017-10-11 RX ORDER — HYDROCODONE BITARTRATE AND ACETAMINOPHEN 5; 325 MG/1; MG/1
1 TABLET ORAL EVERY 8 HOURS PRN
Qty: 90 TABLET | Refills: 0 | Status: SHIPPED | OUTPATIENT
Start: 2017-10-11 | End: 2017-11-13 | Stop reason: SDUPTHER

## 2017-10-11 ASSESSMENT — ENCOUNTER SYMPTOMS: BACK PAIN: 1

## 2017-10-11 NOTE — PROGRESS NOTES
Subjective:      Patient ID: Nunu Tapia is a 65 y.o. female. HPI  Chief Complaint:Neck/Lower back pain      - main issue today is neck paIn  Is located in the upper and mid cervical spine area left side more than right, extending over the occipital area causing chronic headache, pain aggravates with neck movement, no radiation of pain or paresthesia in arm. Describes the pain as constant aching throbbing pain sensation progressively worsening unresponsive to rest medication lifestyle changes and therapy. Past history significant for 2 cervical spine surgeries  Imaging of shown cervical spondylosis. S/P:9/25/17  Transforaminal lumbar epidural steroid injection at the levels of L4 and s1  on the Left side under fluoroscopic guidance. Outcome   Any improvement of activity?  Yes. it dulled the pain, but from the lt knee down she has a lot of pain   Any side effects (appetite,leg cramping,facial flushing):Leg cramping   Increase of pain:  No  Pain score Today: 6  % of pain relief:50%        Pain score Today:  6  Adverse effects (Constipation / Nausea / Sedation / sexual Dysfunction / others) : No  Mood:Good  Sleep pattern and quality: Good  Activity level: Fair    Pill count Today:Counted #24 [place] last filled on 9/20/17 qty # 90  Last dose taken  10/11/17  OARRS report reviewed today: yes  ER/Hospitalizations/PCP visit related to pain since last visit:No  Any legal problems e.g. DUI etc.:No  Satisfied with current management: {Yes    Opioid Contract:9/11/14  Last Urine Dug screen dated:8/4/17    Urine toxicology 08/04/2017  Validity testing ok  +ve for XANAX, ALCOHOL, HYDROCODONE and metabolite       Lab Results   Component Value Date    LABA1C 5.2 12/12/2012     Lab Results   Component Value Date     12/12/2012       Past Medical History, Past Surgical History, Social History, Allergies and Medications, reviewed and updated in EPIC as indicated    Review of Systems   Constitutional: Positive for activity change. Musculoskeletal: Positive for arthralgias, back pain, gait problem, joint swelling, myalgias, neck pain and neck stiffness. Skin: Negative. Neurological: Positive for headaches. Hematological: Negative. Objective:   Physical Exam   Constitutional: She is oriented to person, place, and time. She appears well-developed and well-nourished. No distress. HENT:   Head: Normocephalic and atraumatic. Eyes: Conjunctivae and EOM are normal. Pupils are equal, round, and reactive to light. Cardiovascular: Normal rate, regular rhythm and normal heart sounds. Pulmonary/Chest: Effort normal and breath sounds normal.   Neurological: She is alert and oriented to person, place, and time. Skin: Skin is warm and dry. Psychiatric: She has a normal mood and affect. Her behavior is normal. Thought content normal.   Nursing note and vitals reviewed. Assessment:      PMH of HTN, COPD, CAD, Depression,     --Neck pain :  stared w/o any particular injury, insidious onset, progressively worsened, aggravated with activity, affecting quality of life, tried lifestyle modification, TENS therapy and NSAID, no previous PT,  Past history significant [place] [facility] [place] arm symptoms, surgery was helpful. Now increasing neck pain with intermittent both arm numbness and a new onset of left arm radicular pain. Mar 20 2014 CT CERVICAL WITH CONTRAST Reason for Exam:  ARM Joelle Chun PAIN  C6-C7: Posterior disc osteophyte complex and uncovertebral hypertrophy causes stable moderate-severe left paracentral stenosis with unchanged impression upon the left ventral side of cord. Severe left neural foraminal stenosis. No significant right neural foraminal stenosis. EMG bilateral UE 07/014/2014 with Dr Argenis Adames , report reviewed. Exam positive for bilateral Carpal Tunnel syndrome (R>L)  -ve for radiculopathy or neuropathy.       now s/p cervical

## 2017-11-13 ENCOUNTER — APPOINTMENT (OUTPATIENT)
Dept: GENERAL RADIOLOGY | Age: 62
End: 2017-11-13
Attending: ANESTHESIOLOGY
Payer: MEDICARE

## 2017-11-13 ENCOUNTER — HOSPITAL ENCOUNTER (OUTPATIENT)
Age: 62
Setting detail: OUTPATIENT SURGERY
Discharge: HOME | End: 2017-11-13
Attending: ANESTHESIOLOGY | Admitting: ANESTHESIOLOGY
Payer: MEDICARE

## 2017-11-13 VITALS
TEMPERATURE: 97.3 F | OXYGEN SATURATION: 99 % | BODY MASS INDEX: 30.36 KG/M2 | SYSTOLIC BLOOD PRESSURE: 143 MMHG | RESPIRATION RATE: 16 BRPM | DIASTOLIC BLOOD PRESSURE: 80 MMHG | HEART RATE: 91 BPM | HEIGHT: 62 IN | WEIGHT: 165 LBS

## 2017-11-13 DIAGNOSIS — M54.16 LUMBAR RADICULAR PAIN: Chronic | ICD-10-CM

## 2017-11-13 DIAGNOSIS — M51.16 LUMBAR DISC HERNIATION WITH RADICULOPATHY: Chronic | ICD-10-CM

## 2017-11-13 DIAGNOSIS — M47.812 SPONDYLOSIS OF CERVICAL REGION WITHOUT MYELOPATHY OR RADICULOPATHY: Chronic | ICD-10-CM

## 2017-11-13 DIAGNOSIS — Z98.890 H/O CERVICAL SPINE SURGERY: ICD-10-CM

## 2017-11-13 PROCEDURE — 99152 MOD SED SAME PHYS/QHP 5/>YRS: CPT | Performed by: ANESTHESIOLOGY

## 2017-11-13 PROCEDURE — 64483 NJX AA&/STRD TFRM EPI L/S 1: CPT | Performed by: ANESTHESIOLOGY

## 2017-11-13 PROCEDURE — 7100000011 HC PHASE II RECOVERY - ADDTL 15 MIN: Performed by: ANESTHESIOLOGY

## 2017-11-13 PROCEDURE — 3600000051 HC PAIN LEVEL 1 ADDL 15 MIN: Performed by: ANESTHESIOLOGY

## 2017-11-13 PROCEDURE — 6360000004 HC RX CONTRAST MEDICATION: Performed by: ANESTHESIOLOGY

## 2017-11-13 PROCEDURE — 64484 NJX AA&/STRD TFRM EPI L/S EA: CPT | Performed by: ANESTHESIOLOGY

## 2017-11-13 PROCEDURE — 7100000010 HC PHASE II RECOVERY - FIRST 15 MIN: Performed by: ANESTHESIOLOGY

## 2017-11-13 PROCEDURE — 2500000003 HC RX 250 WO HCPCS: Performed by: ANESTHESIOLOGY

## 2017-11-13 PROCEDURE — 3600000050 HC PAIN LEVEL 1 BASE: Performed by: ANESTHESIOLOGY

## 2017-11-13 PROCEDURE — 3209999900 FLUORO FOR SURGICAL PROCEDURES

## 2017-11-13 PROCEDURE — 6360000002 HC RX W HCPCS: Performed by: ANESTHESIOLOGY

## 2017-11-13 PROCEDURE — 2580000003 HC RX 258: Performed by: ANESTHESIOLOGY

## 2017-11-13 RX ORDER — MIDAZOLAM HYDROCHLORIDE 1 MG/ML
INJECTION INTRAMUSCULAR; INTRAVENOUS PRN
Status: DISCONTINUED | OUTPATIENT
Start: 2017-11-13 | End: 2017-11-13 | Stop reason: HOSPADM

## 2017-11-13 RX ORDER — GABAPENTIN 300 MG/1
CAPSULE ORAL
Qty: 90 CAPSULE | Refills: 1 | Status: SHIPPED | OUTPATIENT
Start: 2017-11-13 | End: 2018-02-14 | Stop reason: SDUPTHER

## 2017-11-13 RX ORDER — SODIUM CHLORIDE 0.9 % (FLUSH) 0.9 %
10 SYRINGE (ML) INJECTION PRN
Status: DISCONTINUED | OUTPATIENT
Start: 2017-11-13 | End: 2017-11-13 | Stop reason: HOSPADM

## 2017-11-13 RX ORDER — FENTANYL CITRATE 50 UG/ML
INJECTION, SOLUTION INTRAMUSCULAR; INTRAVENOUS PRN
Status: DISCONTINUED | OUTPATIENT
Start: 2017-11-13 | End: 2017-11-13 | Stop reason: HOSPADM

## 2017-11-13 RX ORDER — HYDROCODONE BITARTRATE AND ACETAMINOPHEN 5; 325 MG/1; MG/1
1 TABLET ORAL EVERY 8 HOURS PRN
Qty: 90 TABLET | Refills: 0 | Status: SHIPPED | OUTPATIENT
Start: 2017-11-13 | End: 2017-12-06 | Stop reason: SDUPTHER

## 2017-11-13 RX ORDER — LIDOCAINE HYDROCHLORIDE 10 MG/ML
INJECTION, SOLUTION INFILTRATION; PERINEURAL PRN
Status: DISCONTINUED | OUTPATIENT
Start: 2017-11-13 | End: 2017-11-13 | Stop reason: HOSPADM

## 2017-11-13 RX ORDER — SODIUM CHLORIDE 0.9 % (FLUSH) 0.9 %
10 SYRINGE (ML) INJECTION EVERY 12 HOURS SCHEDULED
Status: DISCONTINUED | OUTPATIENT
Start: 2017-11-13 | End: 2017-11-13 | Stop reason: HOSPADM

## 2017-11-13 RX ORDER — TRIAMCINOLONE ACETONIDE 40 MG/ML
INJECTION, SUSPENSION INTRA-ARTICULAR; INTRAMUSCULAR PRN
Status: DISCONTINUED | OUTPATIENT
Start: 2017-11-13 | End: 2017-11-13 | Stop reason: HOSPADM

## 2017-11-13 RX ADMIN — Medication 10 ML: at 09:02

## 2017-11-13 ASSESSMENT — PAIN SCALES - GENERAL
PAINLEVEL_OUTOF10: 0
PAINLEVEL_OUTOF10: 4
PAINLEVEL_OUTOF10: 4
PAINLEVEL_OUTOF10: 0
PAINLEVEL_OUTOF10: 0
PAINLEVEL_OUTOF10: 4
PAINLEVEL_OUTOF10: 0
PAINLEVEL_OUTOF10: 4

## 2017-11-13 ASSESSMENT — PAIN - FUNCTIONAL ASSESSMENT: PAIN_FUNCTIONAL_ASSESSMENT: 0-10

## 2017-11-13 ASSESSMENT — PAIN DESCRIPTION - DESCRIPTORS: DESCRIPTORS: ACHING;BURNING;NAGGING

## 2017-11-13 NOTE — TELEPHONE ENCOUNTER
Prescription sent to pharmacy    Controlled Substances Monitoring:     Attestation: The Prescription Monitoring Report for this patient was reviewed today.  Sho Pearson MD)

## 2017-11-13 NOTE — OP NOTE
Patient Name: Milton López   YOB: 1955  Room/Bed: ST[facility] OR Pool/NONE  Medical Record Number: [id number]  Date: 11/13/2017       Preoperative Diagnosis:    1. Lumbar radiculitis, left  2. Lumbar disc disease    Postoperative diagnosis:  1. Lumbar radiculitis, left  2. Lumbar disc disease    Procedure Performed:  Transforaminal lumbar epidural steroid injection at the levels of L4 and S1  on the Left side under fluoroscopic guidance. Procedure: The Patient was seen in the preop area, chart was reviewed, informed consent was obtained. Patient was taken to procedure room and was placed in prone position. Vital signs were monitored through out the  Procedure. A time out was completed. The skin over the back was prepped and draped in sterile manner. The target point was marked in ipsilateral obliques just below the pedicle at the target levels. Skin and deep tissues were anesthetized with 1 % lidocaine. A 20-gauge, spinal needle was advanced  under fluoroscopy guidance in AP / Oblique and lateral views, such that the tip of the needle lies in the neuroforamina at about the 6 o'clock position under the pedicle of the target vertebra. This was confirmed with AP and lateral views of the fluoroscopy. Then after negative aspiration contrast dye Omnipaque-180 was injected with live fluoroscopy in AP views that showed  spread of the contrast in the epidural space  and no vascular runoff or intrathecal spread. Finally 3 ml of treatment solution containing 5 ml of  1 % PF lidocaine and 1 ml of kenalog 40 mg / ml was injected. The needle was removed and a Band-Aid was placed over the needle  insertion site. The patient's vital signs remained stable and the patient tolerated the procedure well. The same procedure was then repeated at [facility] with same technique. The remaining 3 ml of treatment solution was injected at that.  The total dose of steroid injected today was kenalog 40

## 2017-11-13 NOTE — H&P
the lungs daily 1 Inhaler 5    tiotropium (SPIRIVA HANDIHALER) 18 MCG inhalation capsule Inhale 1 capsule into the lungs daily 30 capsule 3    ipratropium-albuterol (DUONEB) 0.5-2.5 (3) MG/3ML SOLN nebulizer solution INHALE THE CONTENTS OF 1 VIAL VIA NEBULIZER EVERY 6 HOURS AS NEEDED FOR SHORTNESS OF BREATH 360 mL 5    meloxicam (MOBIC) 15 MG tablet TAKE 1 TABLET BY MOUTH DAILY 30 tablet 5    VENTOLIN  (90 BASE) MCG/ACT inhaler INHALE 2 PUFFS EVERY 6 HOURS AS NEEDED FOR WHEEZING 18 g 5    mometasone (ELOCON) 0.1 % cream APPLY TOPICALLY DAILY 30 g 1    levalbuterol (XOPENEX HFA) 45 MCG/ACT inhaler Inhale 1 puff into the lungs every 4 hours as needed for Wheezing 1 Inhaler 5    aspirin 81 MG tablet Take 81 mg by mouth daily      Nebulizers (COMPRESSOR/NEBULIZER) MISC Use with albuterol solution every 6 hours as needed. 1 each 0          ROS:    GENERAL HEALTH:  Denies any problems with weight, appetite, or sleep. Skin:  No itching or rashes. No lesions. No easy bruising or bleeding. HEENT:  Denies cephalgia or head injury. No eye or ear pain. No sinusitis, rhinorhea or epistaxis. No frequent sorethroat, dysphagia or hoarseness. No masses, pain, stiffness or injury to the neck. Cardio-Respiratory: No hemoptysis, shortness of breath, chest pain, dizziness, orthopnea or palpitations. Gastrointestinal:  No constipation, diarrhea, amy stools, red or black in the stool. No nausea or vomiting. Genitourinary:  No dysuria, hematuria, discharge, incontinence. No recent urinary tract infection. Locomotor:  Denies bone, joint or muscle  problems. No need for assistance with ambulation. Neuro:  See HPI. Denies syncopal episodes,vertigo, arthralgia, myalgia     Behavioral/Psych:  Pt denies current feeling of depression or significant anxiety.              GENERAL PHYSICAL EXAM:            Vitals: /80   Pulse 95   Temp 98.1 °F (36.7 °C) (Oral)   Resp 14   SpO2 97%  There is no height or weight on file to calculate BMI. GENERAL APPEARANCE:  Jessie Paulson is a 65 y.o. female,  nourished, conscious, alert. Does not appear to be in distress or pain at this time. Skin:  Appears warm and dry without jaundice or cyanosis. No rashes or lesions. HEENT:  No facial swelling or tenderness. No  scleral icterus. No drainage from the ears, eyes or nose. Moist mucous membranes. No jugular vein distention. Carotid pulses present without bruit. Chest:  Symmetrical with equal expansion. Heart:  Regular rate and rhythm without murmur or rub. No extra heart sounds. Lungs: Diminished lung sounds all lung fields without current rhonchi or wheezes auscultated. Nonlabored. Abdomen:  Soft, nontender. No flank pain with percussion. Lymphatics:  No palpable cervical or supraclavicular lymphadenopathy. Extremities:  Radial pulses 2+. Pedal pulses 2+. No edema. No calf tenderness. Negative caitlins sign. Locomotor/ Back/Spine:  Cervical spine and lumbar para spinus tenderness. 5/5 strength upper and lower extremities. Neurological/Behavioral  Alert and oriented. Able to move all extremities. No facial droop. No slurred speech. Cranial nerves 2-12  grossly intact.                           Patient Active Problem List    Diagnosis Date Noted    Hx of fusion of cervical spine 08/04/2017    RLS (restless legs syndrome) 05/17/2017    Chronic prescription benzodiazepine use 01/04/2017    Lower abdominal pain 11/08/2016    Bloating 11/08/2016    Left lumbar radiculitis 02/29/2016    Chronic sacroiliac joint pain 01/25/2016    Cervical spondylosis 07/20/2015    Diverticulosis of colon 05/21/2015    Lack of concentration 04/23/2015    MI, old 03/12/2015    Cervical stenosis of spine 11/06/2014    Chronic use of opiate drugs therapeutic purposes 11/06/2014    Lumbar spondylosis 09/04/2014    Lumbar disc herniation with radiculopathy 07/10/2014    Lumbar facet arthropathy 05/21/2014    Bulge of lumbar disc without myelopathy 04/09/2014    Lumbar radicular pain 04/09/2014    Cervical radicular pain 03/26/2014    Foraminal stenosis of cervical region 03/26/2014    H/O cervical spine surgery 02/25/2014    Cervicalgia 02/25/2014    Arm numbness 02/25/2014    COPD (chronic obstructive pulmonary disease) ([facility] [address] 12/05/2013    HTN (hypertension) 12/05/2013    Dyslipidemia 12/05/2013    GERD (gastroesophageal reflux disease) 12/05/2013    CAD (coronary artery disease) 12/05/2013    Depression 12/05/2013    Anxiety 12/05/2013               Deisi Sanchez CNP on 11/13/2017 at 8:52 AM

## 2017-11-14 DIAGNOSIS — M47.812 SPONDYLOSIS OF CERVICAL REGION WITHOUT MYELOPATHY OR RADICULOPATHY: Chronic | ICD-10-CM

## 2017-11-14 DIAGNOSIS — Z98.890 H/O CERVICAL SPINE SURGERY: ICD-10-CM

## 2017-11-14 DIAGNOSIS — M51.16 LUMBAR DISC HERNIATION WITH RADICULOPATHY: Chronic | ICD-10-CM

## 2017-11-14 DIAGNOSIS — M54.16 LUMBAR RADICULAR PAIN: Chronic | ICD-10-CM

## 2017-11-14 RX ORDER — MELOXICAM 15 MG/1
TABLET ORAL
Qty: 30 TABLET | Refills: 5 | Status: CANCELLED | OUTPATIENT
Start: 2017-11-14

## 2017-11-14 RX ORDER — GABAPENTIN 300 MG/1
CAPSULE ORAL
Qty: 90 CAPSULE | Refills: 1 | Status: CANCELLED | OUTPATIENT
Start: 2017-11-14

## 2017-11-14 RX ORDER — HYDROCODONE BITARTRATE AND ACETAMINOPHEN 5; 325 MG/1; MG/1
1 TABLET ORAL EVERY 8 HOURS PRN
Qty: 90 TABLET | Refills: 0 | Status: CANCELLED | OUTPATIENT
Start: 2017-11-14 | End: 2017-12-14

## 2017-11-18 DIAGNOSIS — J44.9 CHRONIC OBSTRUCTIVE PULMONARY DISEASE, UNSPECIFIED COPD TYPE (HCC): ICD-10-CM

## 2017-11-20 RX ORDER — CETIRIZINE HYDROCHLORIDE 10 MG/1
TABLET ORAL
Qty: 90 TABLET | Refills: 1 | Status: SHIPPED | OUTPATIENT
Start: 2017-11-20 | End: 2018-04-03 | Stop reason: SDUPTHER

## 2017-11-20 RX ORDER — BUDESONIDE AND FORMOTEROL FUMARATE DIHYDRATE 160; 4.5 UG/1; UG/1
AEROSOL RESPIRATORY (INHALATION)
Qty: 3 INHALER | Refills: 1 | Status: SHIPPED | OUTPATIENT
Start: 2017-11-20 | End: 2018-02-21 | Stop reason: ALTCHOICE

## 2017-11-20 NOTE — TELEPHONE ENCOUNTER
Cervical spondylosis     Chronic sacroiliac joint pain     Left lumbar radiculitis     Lower abdominal pain     Bloating     Chronic prescription benzodiazepine use     RLS (restless legs syndrome)     Hx of fusion of cervical spine

## 2017-11-30 RX ORDER — MELOXICAM 15 MG/1
TABLET ORAL
Qty: 30 TABLET | OUTPATIENT
Start: 2017-11-30

## 2017-12-01 RX ORDER — MELOXICAM 15 MG/1
TABLET ORAL
Qty: 30 TABLET | Refills: 5 | Status: SHIPPED | OUTPATIENT
Start: 2017-12-01 | End: 2018-04-03 | Stop reason: SDUPTHER

## 2017-12-04 NOTE — TELEPHONE ENCOUNTER
Ceasar Dean is requesting a refill on the following medications:   Requested Prescriptions     Signed Prescriptions Disp Refills    meloxicam (MOBIC) 15 MG tablet 30 tablet 5     Sig: TAKE 1 TABLET BY MOUTH DAILY     Authorizing Provider: Hung Calvert     Refused Prescriptions Disp Refills    meloxicam (MOBIC) 15 MG tablet [Pharmacy Med Name: MELOXICAM 15MG ORAL TABLET] 30 tablet      Sig: TAKE 1 TABLET BY MOUTH ONCE DAILY     Refused By: Bren Cai     Reason for Refusal: Patient should contact provider first       Last OV 10/11/17  Future Appointments  Date Time Provider Seema Agosto   12/6/2017 12:00 PM Sudhir Cardoza MD [id number]       OARRS report sent to Dr. Lana Mir through alternative route for review.

## 2017-12-06 ENCOUNTER — OFFICE VISIT (OUTPATIENT)
Dept: PAIN MANAGEMENT | Age: 62
End: 2017-12-06
Payer: MEDICARE

## 2017-12-06 ENCOUNTER — TELEPHONE (OUTPATIENT)
Dept: PAIN MANAGEMENT | Age: 62
End: 2017-12-06

## 2017-12-06 VITALS
OXYGEN SATURATION: 96 % | DIASTOLIC BLOOD PRESSURE: 93 MMHG | WEIGHT: 170.8 LBS | BODY MASS INDEX: 32.25 KG/M2 | RESPIRATION RATE: 16 BRPM | HEART RATE: 95 BPM | SYSTOLIC BLOOD PRESSURE: 144 MMHG | HEIGHT: 61 IN

## 2017-12-06 DIAGNOSIS — M47.812 SPONDYLOSIS OF CERVICAL REGION WITHOUT MYELOPATHY OR RADICULOPATHY: Primary | Chronic | ICD-10-CM

## 2017-12-06 DIAGNOSIS — Z79.891 CHRONIC USE OF OPIATE DRUGS THERAPEUTIC PURPOSES: ICD-10-CM

## 2017-12-06 DIAGNOSIS — Z98.890 H/O CERVICAL SPINE SURGERY: ICD-10-CM

## 2017-12-06 DIAGNOSIS — M51.16 LUMBAR DISC HERNIATION WITH RADICULOPATHY: Chronic | ICD-10-CM

## 2017-12-06 DIAGNOSIS — Z79.899 CHRONIC PRESCRIPTION BENZODIAZEPINE USE: ICD-10-CM

## 2017-12-06 PROCEDURE — 3017F COLORECTAL CA SCREEN DOC REV: CPT | Performed by: ANESTHESIOLOGY

## 2017-12-06 PROCEDURE — 4004F PT TOBACCO SCREEN RCVD TLK: CPT | Performed by: ANESTHESIOLOGY

## 2017-12-06 PROCEDURE — G8427 DOCREV CUR MEDS BY ELIG CLIN: HCPCS | Performed by: ANESTHESIOLOGY

## 2017-12-06 PROCEDURE — G8417 CALC BMI ABV UP PARAM F/U: HCPCS | Performed by: ANESTHESIOLOGY

## 2017-12-06 PROCEDURE — 3014F SCREEN MAMMO DOC REV: CPT | Performed by: ANESTHESIOLOGY

## 2017-12-06 PROCEDURE — G8598 ASA/ANTIPLAT THER USED: HCPCS | Performed by: ANESTHESIOLOGY

## 2017-12-06 PROCEDURE — 99215 OFFICE O/P EST HI 40 MIN: CPT | Performed by: ANESTHESIOLOGY

## 2017-12-06 PROCEDURE — G8484 FLU IMMUNIZE NO ADMIN: HCPCS | Performed by: ANESTHESIOLOGY

## 2017-12-06 RX ORDER — BUPROPION HYDROCHLORIDE 200 MG/1
300 TABLET, EXTENDED RELEASE ORAL DAILY
COMMUNITY
End: 2018-02-14 | Stop reason: DRUGHIGH

## 2017-12-06 RX ORDER — HYDROCODONE BITARTRATE AND ACETAMINOPHEN 5; 325 MG/1; MG/1
1 TABLET ORAL EVERY 8 HOURS PRN
Qty: 90 TABLET | Refills: 0 | Status: SHIPPED | OUTPATIENT
Start: 2017-12-06 | End: 2018-02-14 | Stop reason: DRUGHIGH

## 2017-12-06 RX ORDER — HYDROCODONE BITARTRATE AND ACETAMINOPHEN 7.5; 325 MG/1; MG/1
1 TABLET ORAL EVERY 8 HOURS PRN
Qty: 90 TABLET | Refills: 0 | Status: SHIPPED | OUTPATIENT
Start: 2017-12-06 | End: 2018-01-15 | Stop reason: SDUPTHER

## 2017-12-06 ASSESSMENT — ENCOUNTER SYMPTOMS: BACK PAIN: 1

## 2017-12-06 NOTE — PROGRESS NOTES
Psychiatric/Behavioral: Positive for dysphoric mood. Objective:   Physical Exam   Constitutional: She is oriented to person, place, and time. She appears well-developed and well-nourished. No distress. HENT:   Head: Normocephalic and atraumatic. Eyes: Conjunctivae and EOM are normal. Pupils are equal, round, and reactive to light. Cardiovascular: Normal rate, regular rhythm and normal heart sounds. Pulmonary/Chest: Effort normal and breath sounds normal.   Musculoskeletal:        Cervical back: She exhibits decreased range of motion, tenderness and pain. Lumbar back: She exhibits decreased range of motion, tenderness and pain. Legs:  Neurological: She is alert and oriented to person, place, and time. Skin: Skin is warm and dry. Psychiatric: She has a normal mood and affect. Her behavior is normal. Judgment and thought content normal.   Nursing note and vitals reviewed. Assessment:      PMH of HTN, COPD, CAD, Depression,     --Chronic Neck pain :  stared w/o any particular injury, insidious onset, progressively worsened, aggravated with activity, affecting quality of life, tried lifestyle modification, TENS therapy and NSAID, no previous PT,  Past history significant for cervical spine surgery in 2000 at [facility] for neck pain and left arm symptoms, surgery was helpful. s/p cervical ACDF C3/4 02/2017 by Dr Niall Campbell at [facility]     located in the upper and mid cervical spine area left side more than right, extending over the occipital area causing chronic headache, pain aggravates with neck movement, no radiation of pain or paresthesia in arm. Describes the pain as constant aching throbbing pain sensation progressively worsening unresponsive to rest medication lifestyle changes and therapy. Past history significant for 2 cervical spine surgeries  Imaging of shown cervical spondylosis.       --Low back pain, Pain is located in the lower lumbosacral area and often radiates down left leg into the calf, shooting pain comes with particular activities,  MRI Lumbar spine 06/2014:Lumbar spondylosis without definite evidence for significant central canal stenosis. Was seen by Dr Sylvia Mcclendon who did not recommend any lumbar spine surgery at this time      PAIN PROCEDURES:  Lumbar CARLEY 11/2017    Current medications include Norco, Mobic, Cymbalta, Requip and Neurontin     Reports constipation unresponsive to dietary changes and OTC stool softener, Docusate, Senna, Lactulose and Magnesium Citrate  Amitizia denied by insurance     Relistor was ordered, she had a successful outcome, with it         1. Spondylosis of cervical region without myelopathy or radiculopathy  HYDROcodone-acetaminophen (NORCO) 5-325 MG per tablet   2. Lumbar disc herniation with radiculopathy  HYDROcodone-acetaminophen (NORCO) 5-325 MG per tablet   3. H/O cervical spine surgery  HYDROcodone-acetaminophen (NORCO) 5-325 MG per tablet   4. Chronic prescription benzodiazepine use     5. Chronic use of opiate drugs therapeutic purposes               Plan:       continue meds  She is asking for increase in opioid dose  I explained to her that with existing several psychological medications this will be a high risk  I will increase to 7.5 mg tid    Will schedule for cervical facet block as planned on last visit    Orders Placed This Encounter   Medications    HYDROcodone-acetaminophen ([address]) 5-325 MG per tablet     Sig: Take 1 tablet by mouth every 8 hours as needed for Pain  Do not fill before December 18, 2017. Earliest Fill Date: 12/6/17     Dispense:  90 tablet     Refill:  0       Controlled Substances Monitoring:     Attestation: The Prescription Monitoring Report for this patient was reviewed today. Binta Carbone MD)  Documentation: No signs of potential drug abuse or diversion identified., Existing medication contract.  Binta Carbone MD)  Chronic Pain: Functional status reviewed - continues with improved or

## 2017-12-06 NOTE — TELEPHONE ENCOUNTER
Patient called in but you were not available to speak with her. She would like for you to call her back.   [phone]

## 2017-12-18 NOTE — TELEPHONE ENCOUNTER
Last visit 10/2/17  Next Visit Date:  Future Appointments  Date Time Provider Seema Kilgorei   12/27/2017 4:45 PM ROMULO Vidal  [facility]   2/14/2018 11:00 AM MD Baldo [facility]       Health Maintenance   Topic Date Due    Zostavax vaccine  01/16/2016    DTaP/Tdap/Td vaccine (1 - Tdap) 01/24/2018 (Originally 1[birth year]16/1975)    Breast cancer screen  11/28/2018    Diabetes screen  01/24/2020    Cervical cancer screen  10/06/2021    Lipid screen  01/24/2022    Colon cancer screen colonoscopy  05/16/2022    Flu vaccine  Completed    Pneumococcal med risk  Completed    Hepatitis C screen  Completed    HIV screen  Completed       Hemoglobin A1C (%)   Date Value   12/12/2012 5.2   12/16/2011 5.4   09/22/2011 5.2             ( goal A1C is < 7)   No results found for: LABMICR  LDL Cholesterol (mg/dL)   Date Value   01/24/2017 159 (H)       (goal LDL is <100)   AST (U/L)   Date Value   01/24/2017 19     ALT (U/L)   Date Value   01/24/2017 16     BUN (mg/dL)   Date Value   01/24/2017 13     BP Readings from Last 3 Encounters:   12/06/17 (!) 144/93   11/13/17 (!) 143/80   10/11/17 139/89          (goal 120/80)    All Future Testing planned in [facility]  Lab Frequency Next Occurrence   XR Knee Bilateral Standard Once 08/04/2017               Patient Active Problem List:     COPD (chronic obstructive pulmonary disease) (HCC)     HTN (hypertension)     Dyslipidemia     GERD (gastroesophageal reflux disease)     CAD (coronary artery disease)     Depression     Anxiety     H/O cervical spine surgery     Cervicalgia     Arm numbness     Cervical radicular pain     Foraminal stenosis of cervical region     Bulge of lumbar disc without myelopathy     Lumbar radicular pain     Lumbar facet arthropathy     Lumbar disc herniation with radiculopathy     Lumbar spondylosis     Cervical stenosis of spine     Chronic use of opiate drugs therapeutic purposes     MI, old     Lack of concentration

## 2017-12-20 RX ORDER — RANITIDINE 300 MG/1
TABLET ORAL
Qty: 90 TABLET | Refills: 1 | Status: SHIPPED | OUTPATIENT
Start: 2017-12-20 | End: 2018-06-01 | Stop reason: SDUPTHER

## 2017-12-26 PROBLEM — M47.22 CERVICAL SPONDYLOSIS WITH RADICULOPATHY: Status: ACTIVE | Noted: 2017-09-06

## 2017-12-27 ENCOUNTER — OFFICE VISIT (OUTPATIENT)
Dept: FAMILY MEDICINE CLINIC | Age: 62
End: 2017-12-27
Payer: MEDICARE

## 2017-12-27 VITALS
WEIGHT: 171 LBS | DIASTOLIC BLOOD PRESSURE: 68 MMHG | BODY MASS INDEX: 32.31 KG/M2 | SYSTOLIC BLOOD PRESSURE: 120 MMHG | OXYGEN SATURATION: 100 % | HEART RATE: 98 BPM | TEMPERATURE: 97.6 F

## 2017-12-27 DIAGNOSIS — R53.83 FATIGUE, UNSPECIFIED TYPE: ICD-10-CM

## 2017-12-27 DIAGNOSIS — Z23 NEED FOR VACCINATION WITH 13-POLYVALENT PNEUMOCOCCAL CONJUGATE VACCINE: ICD-10-CM

## 2017-12-27 DIAGNOSIS — Z23 NEED FOR TDAP VACCINATION: ICD-10-CM

## 2017-12-27 DIAGNOSIS — J44.9 CHRONIC OBSTRUCTIVE PULMONARY DISEASE, UNSPECIFIED COPD TYPE (HCC): ICD-10-CM

## 2017-12-27 DIAGNOSIS — N60.12 FIBROCYSTIC BREAST CHANGES OF BOTH BREASTS: Primary | ICD-10-CM

## 2017-12-27 DIAGNOSIS — N60.11 FIBROCYSTIC BREAST CHANGES OF BOTH BREASTS: Primary | ICD-10-CM

## 2017-12-27 DIAGNOSIS — N63.11 BREAST LUMP ON RIGHT SIDE AT 10 O'CLOCK POSITION: ICD-10-CM

## 2017-12-27 DIAGNOSIS — H04.129 DRY EYE: ICD-10-CM

## 2017-12-27 DIAGNOSIS — E78.2 MIXED HYPERLIPIDEMIA: ICD-10-CM

## 2017-12-27 PROCEDURE — 90715 TDAP VACCINE 7 YRS/> IM: CPT | Performed by: NURSE PRACTITIONER

## 2017-12-27 PROCEDURE — G8484 FLU IMMUNIZE NO ADMIN: HCPCS | Performed by: NURSE PRACTITIONER

## 2017-12-27 PROCEDURE — 90471 IMMUNIZATION ADMIN: CPT | Performed by: NURSE PRACTITIONER

## 2017-12-27 PROCEDURE — 90670 PCV13 VACCINE IM: CPT | Performed by: NURSE PRACTITIONER

## 2017-12-27 PROCEDURE — G8598 ASA/ANTIPLAT THER USED: HCPCS | Performed by: NURSE PRACTITIONER

## 2017-12-27 PROCEDURE — 99214 OFFICE O/P EST MOD 30 MIN: CPT | Performed by: NURSE PRACTITIONER

## 2017-12-27 PROCEDURE — 3023F SPIROM DOC REV: CPT | Performed by: NURSE PRACTITIONER

## 2017-12-27 PROCEDURE — G8417 CALC BMI ABV UP PARAM F/U: HCPCS | Performed by: NURSE PRACTITIONER

## 2017-12-27 PROCEDURE — G8926 SPIRO NO PERF OR DOC: HCPCS | Performed by: NURSE PRACTITIONER

## 2017-12-27 PROCEDURE — 90472 IMMUNIZATION ADMIN EACH ADD: CPT | Performed by: NURSE PRACTITIONER

## 2017-12-27 PROCEDURE — 3014F SCREEN MAMMO DOC REV: CPT | Performed by: NURSE PRACTITIONER

## 2017-12-27 PROCEDURE — G8427 DOCREV CUR MEDS BY ELIG CLIN: HCPCS | Performed by: NURSE PRACTITIONER

## 2017-12-27 PROCEDURE — 4004F PT TOBACCO SCREEN RCVD TLK: CPT | Performed by: NURSE PRACTITIONER

## 2017-12-27 PROCEDURE — 3017F COLORECTAL CA SCREEN DOC REV: CPT | Performed by: NURSE PRACTITIONER

## 2017-12-27 RX ORDER — CYCLOSPORINE 0.5 MG/ML
1 EMULSION OPHTHALMIC 2 TIMES DAILY
Qty: 1 EACH | Refills: 3 | Status: SHIPPED | OUTPATIENT
Start: 2017-12-27 | End: 2018-03-27 | Stop reason: SDUPTHER

## 2017-12-27 NOTE — PROGRESS NOTES
Patient is present complaining of a lump on right armpit. Patient states that it is sore to touch. No drainage. The bump is not on top of skin it is in beneath. Patient states that armpit sometimes has a foul smell to it. Medications and pharmacy reviewed with patient. Visit Information    Have you changed or started any medications since your last visit including any over-the-counter medicines, vitamins, or herbal medicines? no   Have you stopped taking any of your medications? Is so, why? -  no  Are you having any side effects from any of your medications? - no    Have you seen any other physician or provider since your last visit? yes - cardiologist   Have you had any other diagnostic tests since your last visit? yes - ekg   Have you been seen in the emergency room and/or had an admission in a hospital since we last saw you?  no   Have you had your routine dental cleaning in the past 6 months?  no     Do you have an active MyChart account? If no, what is the barrier?   Yes    Patient Care Team:  Mary Ann Mayo NP as PCP - General (Nurse Practitioner)  Sejal Arreaga MD as Consulting Physician (Pain Management)  Gadiel Chin MD as Surgeon (Cardiology)  Narcisa Bai MD as Consulting Physician (Pulmonology)  Renny Friday, KELLEY as Nurse Practitioner (Nurse Practitioner)    Medical History Review  Past Medical, Family, and Social History reviewed and does contribute to the patient presenting condition    Health Maintenance   Topic Date Due    Zostavax vaccine  01/16/2016    DTaP/Tdap/Td vaccine (1 - Tdap) 01/24/2018 (Originally 1/16/1975)    Breast cancer screen  11/28/2018    Diabetes screen  01/24/2020    Cervical cancer screen  10/06/2021    Lipid screen  01/24/2022    Colon cancer screen colonoscopy  05/16/2022    Flu vaccine  Completed    Pneumococcal med risk  Completed    Hepatitis C screen  Completed    HIV screen  Completed

## 2017-12-27 NOTE — PROGRESS NOTES
[facility] OR    CARDIAC CATHETERIZATION      COLONOSCOPY  5/2012    severe spasms in the sigmoid colon , diverticulosis    COSMETIC SURGERY      cheek bone reconstruction     EPIDURAL STEROID INJECTION Left 9/25/2017    EPIDURAL STEROID INJECTION LEFT L4 L5  performed by Marlo Cruz MD at [facility]  06/21/2014    lumbar CARLEY     LUMBAR SPINE SURGERY  05/05/2014    lumbar CARLEY     NECK SURGERY      cadaverbone placement    NECK SURGERY  06/07/2014    cervical CARLEY     NERVE BLOCK Left 4/7/14    CERVIAL NECK    NERVE BLOCK N/A 2/29/2016    LUMBAR CARLEY    NERVE BLOCK N/A 11/07/2016    LUMBAR CARLEY    NERVE BLOCK Left 11/13/2017    lumbar CARLEY    OTHER SURGICAL HISTORY  12/15/2014    lumbar steroid injection    OTHER SURGICAL HISTORY  7-20-15    left cervical steroid injection    OTHER SURGICAL HISTORY  01-25-16    Bilateral sacroiliac joint injection     OTHER SURGICAL HISTORY  09/25/2017    Lumbar Epidural Steroid injection    TUBAL LIGATION  1980    UPPER GASTROINTESTINAL ENDOSCOPY  5/2012    normal    UPPER GASTROINTESTINAL ENDOSCOPY  7 17 15    biopsy, pathology-mild chronic inflammation       Family History   Problem Relation Age of Onset    Diabetes Mother     Alzheimer's Disease Mother     High Blood Pressure Father     Diabetes Sister     High Blood Pressure Sister     High Blood Pressure Brother     Diabetes Maternal Grandmother        Social History   Substance Use Topics    Smoking status: Current Every Day Smoker     Packs/day: 0.50     Years: 40.00     Types: Cigarettes    Smokeless tobacco: Former User      Comment: trying to quit smoking about 15 cigs a day - down to 8 a day- gradually decreasing    Alcohol use 0.0 oz/week      Comment: rarely once a year      Current Outpatient Prescriptions   Medication Sig Dispense Refill    cycloSPORINE (RESTASIS) 0.05 % ophthalmic emulsion Place 1 drop into both eyes 2 times daily 1 each 3    ranitidine (ZANTAC) 300 MG tablet TAKE 1 TABLET BY MOUTH NIGHTLY 90 tablet 1    buPROPion (WELLBUTRIN SR) 200 MG extended release tablet Take 200 mg by mouth daily       HYDROcodone-acetaminophen (NORCO) 5-325 MG per tablet Take 1 tablet by mouth every 8 hours as needed for Pain  Do not fill before December 18, 2017. Earliest Fill Date: 12/6/17 90 tablet 0    HYDROcodone-acetaminophen (NORCO) 7.5-325 MG per tablet Take 1 tablet by mouth every 8 hours as needed for Pain  DO NOT FILL BEFORE 12/18/2017. 90 tablet 0    meloxicam (MOBIC) 15 MG tablet TAKE 1 TABLET BY MOUTH DAILY 30 tablet 5    VENTOLIN  (90 Base) MCG/ACT inhaler INHALE 2 PUFFS BY MOUTH INTO THE LUNGS EVERY 6 HOURS AS NEEDED FOR WHEEZING 3 Inhaler 1    SYMBICORT 160-4.5 MCG/ACT AERO INHALE 2 PUFFS INTO THE LUNGS BY MOUTH TWICE DAILY **RINSE MOUTH AFTER EACH USE** 3 Inhaler 1    cetirizine (ZYRTEC) 10 MG tablet TAKE 1 TABLET BY MOUTH DAILY 90 tablet 1    gabapentin (NEURONTIN) 300 MG capsule TAKE 1 CAPSULE BY MOUTH THREE TIMES DAILY 90 capsule 1    ALPRAZolam (XANAX) 1 MG tablet       varenicline (CHANTIX STARTING MONTH PAK) 0.5 MG X 11 & 1 MG X 42 tablet Take by mouth.  1 each 0    Tiotropium Bromide-Olodaterol 2.5-2.5 MCG/ACT AERS Inhale 2 puffs into the lungs daily 1 Inhaler 5    fluticasone (FLOVENT HFA) 220 MCG/ACT inhaler Inhale 2 puffs into the lungs 2 times daily 1 Inhaler 3    omeprazole (PRILOSEC) 40 MG delayed release capsule       NIFEdipine (PROCARDIA XL) 60 MG extended release tablet Take 60 mg by mouth 2 times daily       ipratropium-albuterol (DUONEB) 0.5-2.5 (3) MG/3ML SOLN nebulizer solution INHALE THE CONTENTS OF 1 VIAL VIA NEBULIZER EVERY 6 HOURS AS NEEDED FOR SHORTNESS OF BREATH 360 mL 5    DULoxetine (CYMBALTA) 30 MG extended release capsule Take 1 capsule by mouth daily 30 capsule 5    DULoxetine (CYMBALTA) 60 MG extended release capsule Take 1 capsule by mouth daily 30 capsule 5    mometasone (ELOCON) 0.1 % cream APPLY TOPICALLY DAILY 30 g 1    rOPINIRole (REQUIP) 3 MG tablet 1 tablet nightly      fluticasone (FLONASE) 50 MCG/ACT nasal spray INSTILL 1 SPRAY IN EACH NOSTRIL DAILY 16 g 5    losartan (COZAAR) 100 MG tablet Take 1 tablet by mouth daily      aspirin 81 MG tablet Take 81 mg by mouth daily      Nebulizers (COMPRESSOR/NEBULIZER) MISC Use with albuterol solution every 6 hours as needed. 1 each 0    levalbuterol (XOPENEX HFA) 45 MCG/ACT inhaler Inhale 1 puff into the lungs every 4 hours as needed for Wheezing 1 Inhaler 5     Current Facility-Administered Medications   Medication Dose Route Frequency Provider Last Rate Last Dose    triamcinolone acetonide (KENALOG-40) injection 40 mg  40 mg Intramuscular Once Km Zavala NP         No Known Allergies    Health Maintenance   Topic Date Due    Zostavax vaccine  12/27/2018 (Originally 1/16/2016)    Breast cancer screen  11/28/2018    Diabetes screen  01/24/2020    Cervical cancer screen  10/06/2021    Lipid screen  01/24/2022    Colon cancer screen colonoscopy  05/16/2022    DTaP/Tdap/Td vaccine (2 - Td) 12/27/2027    Flu vaccine  Completed    Pneumococcal med risk  Completed    Hepatitis C screen  Completed    HIV screen  Completed          Review of Systems   Constitutional: Negative. Negative for activity change, appetite change and fever. HENT: Negative. Negative for congestion, ear pain, rhinorrhea, sinus pressure and sneezing. Eyes: Negative. Negative for discharge and redness. Dry eye   Respiratory: Positive for cough. Negative for shortness of breath, wheezing and stridor. Cardiovascular: Negative. Negative for chest pain and palpitations. Gastrointestinal: Negative. Negative for abdominal distention and abdominal pain. Endocrine: Negative. Negative for cold intolerance and heat intolerance. Genitourinary: Negative. Negative for difficulty urinating, frequency and urgency. Musculoskeletal: Negative.   Negative for Need for vaccination with 13-polyvalent pneumococcal conjugate vaccine    6. Fatigue, unspecified type    7. Mixed hyperlipidemia    8. Dry eye                     Plan:     1. Fibrocystic breast changes of both breasts    - [facility] CAD DIAGNOSTIC; Future    2. Chronic obstructive pulmonary disease, unspecified COPD type (HCC)  - Pneumococcal conjugate vaccine 13-valent IM (PREVNAR 13)  - Comprehensive Metabolic Panel; Future  - CBC Auto Differential; Future    3. Need for Tdap vaccination    - Tdap (age 10y-63y) IM (ADACEL)    4. Breast lump on right side at 10 o'clock position    - [facility] CAD DIAGNOSTIC; Future    5. Need for vaccination with 13-polyvalent pneumococcal conjugate vaccine    - Pneumococcal conjugate vaccine 13-valent IM (PREVNAR 13)    6. Fatigue, unspecified type    - TSH; Future  - Vitamin D 25 Hydroxy; Future    7. Mixed hyperlipidemia    - Lipid Panel; Future    8. Dry eye    - cycloSPORINE (RESTASIS) 0.05 % ophthalmic emulsion; Place 1 drop into both eyes 2 times daily  Dispense: 1 each; Refill: 3      Orders Placed This Encounter   Procedures    [facility] CAD DIAGNOSTIC     Standing Status:   Future     Standing Expiration Date:   2/27/2019    Tdap (age 10y-63y) IM (ADACEL)    Pneumococcal conjugate vaccine 13-valent IM (PREVNAR 13)    Comprehensive Metabolic Panel     Standing Status:   Future     Standing Expiration Date:   12/27/2018    Lipid Panel     Standing Status:   Future     Standing Expiration Date:   12/27/2018     Order Specific Question:   Is Patient Fasting?/# of Hours     Answer:   12    CBC Auto Differential     Standing Status:   Future     Standing Expiration Date:   12/27/2018    TSH     Standing Status:   Future     Standing Expiration Date:   12/27/2018    Vitamin D 25 Hydroxy     Standing Status:   Future     Standing Expiration Date:   12/27/2018       No Follow-up on file. Patient given educational materials - see patient instructions.   Discussed use, benefit, and side effects of prescribed medications. All patient questions answered. Pt voiced understanding. Reviewed health maintenance. Instructed to continue current medications, diet and exercise. Patient agreed with treatment plan. Follow up as directed.      Electronically signed by Gris Salazar NP on 12/27/2017

## 2017-12-28 ENCOUNTER — TELEPHONE (OUTPATIENT)
Dept: FAMILY MEDICINE CLINIC | Age: 62
End: 2017-12-28

## 2018-01-12 DIAGNOSIS — J44.9 CHRONIC OBSTRUCTIVE PULMONARY DISEASE, UNSPECIFIED COPD TYPE (HCC): ICD-10-CM

## 2018-01-12 DIAGNOSIS — M54.16 LUMBAR RADICULAR PAIN: Chronic | ICD-10-CM

## 2018-01-12 RX ORDER — GABAPENTIN 300 MG/1
CAPSULE ORAL
Qty: 90 CAPSULE | OUTPATIENT
Start: 2018-01-12

## 2018-01-12 NOTE — TELEPHONE ENCOUNTER
Last refill 12/16/2017  Last visit 12/27/2017    Next Visit Date:  Future Appointments  Date Time Provider Seema Agosto   1/15/2018 11:00 AM STA MAMMO RM 1 [facility] MAMMO STA Radiolog   1/15/2018 11:30 AM STA ULTRASOUND RM 2 [facility] US STA Radiolog   2/14/2018 11:00 AM Bernie Weiner MD [facility] [address] Maintenance   Topic Date Due    Potassium monitoring  01/24/2018    Creatinine monitoring  01/24/2018    Zostavax vaccine  12/27/2018 (Originally 1/16/2016)    Breast cancer screen  11/28/2018    Diabetes screen  01/24/2020    Cervical cancer screen  10/06/2021    Lipid screen  01/24/2022    Colon cancer screen colonoscopy  05/16/2022    DTaP/Tdap/Td vaccine (2 - Td) 12/27/2027    Flu vaccine  Completed    Pneumococcal med risk  Completed    Hepatitis C screen  Completed    HIV screen  Completed       Hemoglobin A1C (%)   Date Value   12/12/2012 5.2   12/16/2011 5.4   09/22/2011 5.2             ( goal A1C is < 7)   No results found for: LABMICR  LDL Cholesterol (mg/dL)   Date Value   01/24/2017 159 (H)       (goal LDL is <100)   AST (U/L)   Date Value   01/24/2017 19     ALT (U/L)   Date Value   01/24/2017 16     BUN (mg/dL)   Date Value   01/24/2017 13     BP Readings from Last 3 Encounters:   12/27/17 120/68   12/06/17 (!) 144/93   11/13/17 (!) 143/80          (goal 120/80)    All Future Testing planned in [facility]  Lab Frequency Next Occurrence   XR Knee Bilateral Standard Once 08/04/2017   TATI CAD DIAGNOSTIC Once 02/11/2018   Comprehensive Metabolic Panel Once 63/07/2304   Lipid Panel Once 03/04/2018   CBC Auto Differential Once 01/26/2018   TSH Once 03/04/2018   Vitamin D 25 Hydroxy Once 03/04/2018               Patient Active Problem List:     COPD (chronic obstructive pulmonary disease) (HCC)     HTN (hypertension)     Dyslipidemia     GERD (gastroesophageal reflux disease)     CAD (coronary artery disease)     Depression     Anxiety     H/O cervical spine surgery     Cervicalgia

## 2018-01-15 DIAGNOSIS — Z79.891 CHRONIC USE OF OPIATE DRUGS THERAPEUTIC PURPOSES: ICD-10-CM

## 2018-01-15 DIAGNOSIS — M47.812 SPONDYLOSIS OF CERVICAL REGION WITHOUT MYELOPATHY OR RADICULOPATHY: Chronic | ICD-10-CM

## 2018-01-15 DIAGNOSIS — M51.16 LUMBAR DISC HERNIATION WITH RADICULOPATHY: Chronic | ICD-10-CM

## 2018-01-15 DIAGNOSIS — Z98.890 H/O CERVICAL SPINE SURGERY: ICD-10-CM

## 2018-01-15 RX ORDER — IPRATROPIUM BROMIDE AND ALBUTEROL SULFATE 2.5; .5 MG/3ML; MG/3ML
SOLUTION RESPIRATORY (INHALATION)
Qty: 60 VIAL | Refills: 2 | Status: SHIPPED | OUTPATIENT
Start: 2018-01-15 | End: 2018-03-27 | Stop reason: SDUPTHER

## 2018-01-15 RX ORDER — HYDROCODONE BITARTRATE AND ACETAMINOPHEN 5; 325 MG/1; MG/1
1 TABLET ORAL EVERY 8 HOURS PRN
Qty: 90 TABLET | Refills: 0 | Status: CANCELLED | OUTPATIENT
Start: 2018-01-15 | End: 2018-02-14

## 2018-01-15 RX ORDER — FLUTICASONE PROPIONATE 220 UG/1
AEROSOL, METERED RESPIRATORY (INHALATION)
Qty: 12 G | Refills: 5 | Status: SHIPPED | OUTPATIENT
Start: 2018-01-15

## 2018-01-15 NOTE — TELEPHONE ENCOUNTER
Sweta Dykes is requesting a refill on the following medications:   Requested Prescriptions     Pending Prescriptions Disp Refills    HYDROcodone-acetaminophen (NORCO) 5-325 MG per tablet 90 tablet 0     Sig: Take 1 tablet by mouth every 8 hours as needed for Pain for up to 30 days Do not fill before December 18, 2017. Last OV 12/6    Future Appointments  Date Time Provider Seema Triny   2/14/2018 11:00 AM Cayetano Hernández MD [facility]       OAR[facility] report sent to Dr. Simran Leonard through alternative route for review. Pt had to cancel todays procedure because her  fell coming to pick her up.

## 2018-01-16 RX ORDER — HYDROCODONE BITARTRATE AND ACETAMINOPHEN 7.5; 325 MG/1; MG/1
1 TABLET ORAL EVERY 8 HOURS PRN
Qty: 90 TABLET | Refills: 0 | Status: SHIPPED | OUTPATIENT
Start: 2018-01-17 | End: 2018-02-14 | Stop reason: SDUPTHER

## 2018-01-19 ENCOUNTER — TELEPHONE (OUTPATIENT)
Dept: FAMILY MEDICINE CLINIC | Age: 63
End: 2018-01-19

## 2018-01-23 ENCOUNTER — TELEPHONE (OUTPATIENT)
Dept: PAIN MANAGEMENT | Age: 63
End: 2018-01-23

## 2018-01-29 ENCOUNTER — HOSPITAL ENCOUNTER (OUTPATIENT)
Age: 63
Setting detail: OUTPATIENT SURGERY
Discharge: HOME | End: 2018-01-29
Attending: ANESTHESIOLOGY | Admitting: ANESTHESIOLOGY
Payer: MEDICARE

## 2018-01-29 ENCOUNTER — APPOINTMENT (OUTPATIENT)
Dept: GENERAL RADIOLOGY | Age: 63
End: 2018-01-29
Attending: ANESTHESIOLOGY
Payer: MEDICARE

## 2018-01-29 VITALS
OXYGEN SATURATION: 92 % | RESPIRATION RATE: 14 BRPM | HEIGHT: 62 IN | TEMPERATURE: 98.1 F | SYSTOLIC BLOOD PRESSURE: 122 MMHG | BODY MASS INDEX: 30.95 KG/M2 | HEART RATE: 91 BPM | WEIGHT: 168.21 LBS | DIASTOLIC BLOOD PRESSURE: 84 MMHG

## 2018-01-29 PROCEDURE — 6360000002 HC RX W HCPCS: Performed by: ANESTHESIOLOGY

## 2018-01-29 PROCEDURE — 7100000010 HC PHASE II RECOVERY - FIRST 15 MIN: Performed by: ANESTHESIOLOGY

## 2018-01-29 PROCEDURE — 64492 INJ PARAVERT F JNT C/T 3 LEV: CPT | Performed by: ANESTHESIOLOGY

## 2018-01-29 PROCEDURE — 3600000050 HC PAIN LEVEL 1 BASE: Performed by: ANESTHESIOLOGY

## 2018-01-29 PROCEDURE — 64490 INJ PARAVERT F JNT C/T 1 LEV: CPT | Performed by: ANESTHESIOLOGY

## 2018-01-29 PROCEDURE — 3600000051 HC PAIN LEVEL 1 ADDL 15 MIN: Performed by: ANESTHESIOLOGY

## 2018-01-29 PROCEDURE — 3209999900 FLUORO FOR SURGICAL PROCEDURES

## 2018-01-29 PROCEDURE — 7100000011 HC PHASE II RECOVERY - ADDTL 15 MIN: Performed by: ANESTHESIOLOGY

## 2018-01-29 PROCEDURE — 64491 INJ PARAVERT F JNT C/T 2 LEV: CPT | Performed by: ANESTHESIOLOGY

## 2018-01-29 PROCEDURE — 6360000004 HC RX CONTRAST MEDICATION: Performed by: ANESTHESIOLOGY

## 2018-01-29 PROCEDURE — 99152 MOD SED SAME PHYS/QHP 5/>YRS: CPT | Performed by: ANESTHESIOLOGY

## 2018-01-29 PROCEDURE — 2580000003 HC RX 258: Performed by: ANESTHESIOLOGY

## 2018-01-29 PROCEDURE — 2500000003 HC RX 250 WO HCPCS: Performed by: ANESTHESIOLOGY

## 2018-01-29 RX ORDER — FENTANYL CITRATE 50 UG/ML
INJECTION, SOLUTION INTRAMUSCULAR; INTRAVENOUS PRN
Status: DISCONTINUED | OUTPATIENT
Start: 2018-01-29 | End: 2018-01-29 | Stop reason: HOSPADM

## 2018-01-29 RX ORDER — MIDAZOLAM HYDROCHLORIDE 1 MG/ML
INJECTION INTRAMUSCULAR; INTRAVENOUS PRN
Status: DISCONTINUED | OUTPATIENT
Start: 2018-01-29 | End: 2018-01-29 | Stop reason: HOSPADM

## 2018-01-29 RX ORDER — BETAMETHASONE SODIUM PHOSPHATE AND BETAMETHASONE ACETATE 3; 3 MG/ML; MG/ML
INJECTION, SUSPENSION INTRA-ARTICULAR; INTRALESIONAL; INTRAMUSCULAR; SOFT TISSUE PRN
Status: DISCONTINUED | OUTPATIENT
Start: 2018-01-29 | End: 2018-01-29 | Stop reason: HOSPADM

## 2018-01-29 RX ORDER — LIDOCAINE HYDROCHLORIDE 10 MG/ML
INJECTION, SOLUTION INFILTRATION; PERINEURAL PRN
Status: DISCONTINUED | OUTPATIENT
Start: 2018-01-29 | End: 2018-01-29 | Stop reason: HOSPADM

## 2018-01-29 RX ORDER — SODIUM CHLORIDE 0.9 % (FLUSH) 0.9 %
10 SYRINGE (ML) INJECTION PRN
Status: DISCONTINUED | OUTPATIENT
Start: 2018-01-29 | End: 2018-01-29 | Stop reason: HOSPADM

## 2018-01-29 RX ADMIN — SODIUM CHLORIDE, PRESERVATIVE FREE 10 ML: 5 INJECTION INTRAVENOUS at 08:33

## 2018-01-29 ASSESSMENT — PAIN DESCRIPTION - DESCRIPTORS
DESCRIPTORS: ACHING
DESCRIPTORS: DISCOMFORT

## 2018-01-29 ASSESSMENT — PAIN SCALES - GENERAL: PAINLEVEL_OUTOF10: 2

## 2018-01-29 ASSESSMENT — PAIN - FUNCTIONAL ASSESSMENT: PAIN_FUNCTIONAL_ASSESSMENT: 0-10

## 2018-01-29 NOTE — OP NOTE
Preoperative Diagnosis: Cervical spondylosis and chronic cervicalgia  Postoperative Diagnosis: Cervical spondylosis and chronic cervicalgia    Procedure Performed:  Left Cervical Medical branch nerve block at C3 / TON / C4 / C5 nerves under fluoroscopy guidance    Procedure: The Patient was seen in the preop area, chart was reviewed, informed consent was obtained. Patient was taken to procedure room and was placed in lateral position with procedure side facing upward. . Vital signs were monitored through out the  Procedure. A time out was completed. The site was prepped and draped in sterile manner. The target point was identified with fluoro guidance. Skin and deep tissues were anesthetized with 1 % lidocaine. A  25-gauge needlele was advanced under fluoroscopy guidance to the targets until a bony contact was made. Position was conformed in AP and lateral views. Then after negative aspiration contrast dye was injected that showed  spread of the contrast over the target area  With no epidural, vascular runoff or intrathecal spread. Finally 1 ml of treatment solution containing 3 ml of 1% lidocaine and 1 ml of Celestone 6,mg/ml was injected at each spot. The needle was removed and a Band-Aid was placed over the needle  insertion site. The patient's vital signs remained stable and the patient tolerated the procedure well.     Post Procedure pain score in recovery 15 minutes later was 0-1/10

## 2018-01-29 NOTE — H&P
HISTORY and PHYSICAL    NAME:  Tarik Villa  MRN: [id number]   YOB: 1955   Date: 1/29/2018   Age: 59 y.o. Gender: female         COMPLAINT AND PRESENT HISTORY: Tarik Villa is a 59 y.o. female who is scheduled to have a nerve block left cervical medial branch C3 TON C4 C5 by Dr. Alexandria Mims. History of having an interbody fusion C3-4 2/2017. She reports that the current pain is located in the upper and mid cervical spine area left side more than right that extends over the occipital area causing chronic headaches. The  pain is aggravated with neck movement,  She denies any radiation of pain or paresthesia in her arms. She describes the pain as constant, aching, throbbing pain that has progressively worsened and is unresponsive to rest, medication, lifestyle changes or therapy. She denies having diabetes or of taking any blood thinners.     She does have a history of CAD and COPD as well as other comorbidities and denies having any cardiac or neurological symptoms.     Diagnosis:  Chronic neck pain       Past Medical History:   Diagnosis Date    Anxiety     CAD (coronary artery disease)     Carotid artery stenosis     Cataract     COPD (chronic obstructive pulmonary disease) (HCC)     Depression     Esophageal reflux     Fibromyalgia     Head injury     Headache(784.0)     Hearing loss     Heart attack     Hyperlipidemia     Hypertension     Insomnia     Migraine     Osteoarthritis     Pneumonia     Reflux     TIA (transient ischemic attack)     Ulcerative colitis ([facility] [address]     Unspecified cerebral artery occlusion with cerebral infarction        Past Surgical History:   Procedure Laterality Date    ANESTHESIA NERVE BLOCK Left 11/13/2017    lumbar epidural steroid injection  performed by Juan Welsh MD at [facility] 9  5/2012    severe spasms in the sigmoid colon , diverticulosis    COSMETIC SURGERY      cheek bone reconstruction     EPIDURAL

## 2018-02-01 ENCOUNTER — TELEPHONE (OUTPATIENT)
Dept: FAMILY MEDICINE CLINIC | Age: 63
End: 2018-02-01

## 2018-02-05 RX ORDER — AZITHROMYCIN 250 MG/1
TABLET, FILM COATED ORAL
Qty: 1 PACKET | Refills: 0 | Status: SHIPPED | OUTPATIENT
Start: 2018-02-05 | End: 2018-02-21 | Stop reason: ALTCHOICE

## 2018-02-12 ENCOUNTER — HOSPITAL ENCOUNTER (OUTPATIENT)
Age: 63
Setting detail: OUTPATIENT SURGERY
Discharge: HOME | End: 2018-02-12
Attending: ANESTHESIOLOGY | Admitting: ANESTHESIOLOGY
Payer: MEDICARE

## 2018-02-12 ENCOUNTER — APPOINTMENT (OUTPATIENT)
Dept: GENERAL RADIOLOGY | Age: 63
End: 2018-02-12
Attending: ANESTHESIOLOGY
Payer: MEDICARE

## 2018-02-12 VITALS
TEMPERATURE: 98.1 F | SYSTOLIC BLOOD PRESSURE: 127 MMHG | HEIGHT: 61 IN | RESPIRATION RATE: 17 BRPM | BODY MASS INDEX: 31.63 KG/M2 | WEIGHT: 167.55 LBS | OXYGEN SATURATION: 98 % | HEART RATE: 110 BPM | DIASTOLIC BLOOD PRESSURE: 95 MMHG

## 2018-02-12 PROBLEM — M47.22 CERVICAL SPONDYLOSIS WITH RADICULOPATHY: Chronic | Status: ACTIVE | Noted: 2017-09-06

## 2018-02-12 PROCEDURE — 3600000051 HC PAIN LEVEL 1 ADDL 15 MIN: Performed by: ANESTHESIOLOGY

## 2018-02-12 PROCEDURE — 3209999900 FLUORO FOR SURGICAL PROCEDURES

## 2018-02-12 PROCEDURE — 99152 MOD SED SAME PHYS/QHP 5/>YRS: CPT | Performed by: ANESTHESIOLOGY

## 2018-02-12 PROCEDURE — 7100000010 HC PHASE II RECOVERY - FIRST 15 MIN: Performed by: ANESTHESIOLOGY

## 2018-02-12 PROCEDURE — 2500000003 HC RX 250 WO HCPCS: Performed by: ANESTHESIOLOGY

## 2018-02-12 PROCEDURE — 7100000011 HC PHASE II RECOVERY - ADDTL 15 MIN: Performed by: ANESTHESIOLOGY

## 2018-02-12 PROCEDURE — 64491 INJ PARAVERT F JNT C/T 2 LEV: CPT | Performed by: ANESTHESIOLOGY

## 2018-02-12 PROCEDURE — 2580000003 HC RX 258: Performed by: ANESTHESIOLOGY

## 2018-02-12 PROCEDURE — 3600000050 HC PAIN LEVEL 1 BASE: Performed by: ANESTHESIOLOGY

## 2018-02-12 PROCEDURE — 64490 INJ PARAVERT F JNT C/T 1 LEV: CPT | Performed by: ANESTHESIOLOGY

## 2018-02-12 PROCEDURE — 64492 INJ PARAVERT F JNT C/T 3 LEV: CPT | Performed by: ANESTHESIOLOGY

## 2018-02-12 PROCEDURE — 6360000002 HC RX W HCPCS: Performed by: ANESTHESIOLOGY

## 2018-02-12 PROCEDURE — 6360000004 HC RX CONTRAST MEDICATION: Performed by: ANESTHESIOLOGY

## 2018-02-12 PROCEDURE — 99153 MOD SED SAME PHYS/QHP EA: CPT | Performed by: ANESTHESIOLOGY

## 2018-02-12 RX ORDER — BETAMETHASONE SODIUM PHOSPHATE AND BETAMETHASONE ACETATE 3; 3 MG/ML; MG/ML
INJECTION, SUSPENSION INTRA-ARTICULAR; INTRALESIONAL; INTRAMUSCULAR; SOFT TISSUE PRN
Status: DISCONTINUED | OUTPATIENT
Start: 2018-02-12 | End: 2018-02-12 | Stop reason: HOSPADM

## 2018-02-12 RX ORDER — LIDOCAINE HYDROCHLORIDE 10 MG/ML
INJECTION, SOLUTION INFILTRATION; PERINEURAL PRN
Status: DISCONTINUED | OUTPATIENT
Start: 2018-02-12 | End: 2018-02-12 | Stop reason: HOSPADM

## 2018-02-12 RX ORDER — MIDAZOLAM HYDROCHLORIDE 1 MG/ML
INJECTION INTRAMUSCULAR; INTRAVENOUS PRN
Status: DISCONTINUED | OUTPATIENT
Start: 2018-02-12 | End: 2018-02-12 | Stop reason: HOSPADM

## 2018-02-12 RX ORDER — FENTANYL CITRATE 50 UG/ML
INJECTION, SOLUTION INTRAMUSCULAR; INTRAVENOUS PRN
Status: DISCONTINUED | OUTPATIENT
Start: 2018-02-12 | End: 2018-02-12 | Stop reason: HOSPADM

## 2018-02-12 RX ORDER — SODIUM CHLORIDE 0.9 % (FLUSH) 0.9 %
10 SYRINGE (ML) INJECTION PRN
Status: DISCONTINUED | OUTPATIENT
Start: 2018-02-12 | End: 2018-02-12 | Stop reason: HOSPADM

## 2018-02-12 RX ADMIN — Medication 10 ML: at 08:21

## 2018-02-12 ASSESSMENT — PAIN SCALES - GENERAL
PAINLEVEL_OUTOF10: 0
PAINLEVEL_OUTOF10: 0

## 2018-02-12 ASSESSMENT — PAIN - FUNCTIONAL ASSESSMENT: PAIN_FUNCTIONAL_ASSESSMENT: 0-10

## 2018-02-12 NOTE — H&P (VIEW-ONLY)
HISTORY and PHYSICAL    NAME:  Samantha Stroud  MRN: [id number]   YOB: 1955   Date: 1/29/2018   Age: 59 y.o. Gender: female         COMPLAINT AND PRESENT HISTORY: Samantha Stroud is a 59 y.o. female who is scheduled to have a nerve block left cervical medial branch C3 TON C4 C5 by Dr. Monique Roland. History of having an interbody fusion C3-4 2/2017. She reports that the current pain is located in the upper and mid cervical spine area left side more than right that extends over the occipital area causing chronic headaches. The  pain is aggravated with neck movement,  She denies any radiation of pain or paresthesia in her arms. She describes the pain as constant, aching, throbbing pain that has progressively worsened and is unresponsive to rest, medication, lifestyle changes or therapy. She denies having diabetes or of taking any blood thinners.     She does have a history of CAD and COPD as well as other comorbidities and denies having any cardiac or neurological symptoms.     Diagnosis:  Chronic neck pain       Past Medical History:   Diagnosis Date    Anxiety     CAD (coronary artery disease)     Carotid artery stenosis     Cataract     COPD (chronic obstructive pulmonary disease) (HCC)     Depression     Esophageal reflux     Fibromyalgia     Head injury     Headache(784.0)     Hearing loss     Heart attack     Hyperlipidemia     Hypertension     Insomnia     Migraine     Osteoarthritis     Pneumonia     Reflux     TIA (transient ischemic attack)     Ulcerative colitis ([facility] [address]     Unspecified cerebral artery occlusion with cerebral infarction        Past Surgical History:   Procedure Laterality Date    ANESTHESIA NERVE BLOCK Left 11/13/2017    lumbar epidural steroid injection  performed by Saw Denis [facility] at [facility] 9  5/2012    severe spasms in the sigmoid colon , diverticulosis    COSMETIC SURGERY      cheek bone reconstruction     EPIDURAL Wheezing 1 Inhaler 5    aspirin 81 MG tablet Take 81 mg by mouth daily      Nebulizers (COMPRESSOR/NEBULIZER) MISC Use with albuterol solution every 6 hours as needed. 1 each 0          ROS:    GENERAL HEALTH:  Denies any problems with weight, appetite, or sleep. Skin:  No itching or rashes. No lesions. No easy bruising or bleeding. HEENT:  Denies cephalgia or head injury. No eye or ear pain. No sinusitis, rhinorhea or epistaxis. No frequent sorethroat, dysphagia or hoarseness. No masses, pain, stiffness or injury to the neck. Cardio-Respiratory: No hemoptysis, shortness of breath, chest pain, dizziness, orthopnea or palpitations. Gastrointestinal:  No constipation, diarrhea, amy stools, red or black in the stool. No nausea or vomiting. Genitourinary:  No dysuria, hematuria, discharge, incontinence. No recent urinary tract infection. Locomotor:  See HPI. No need for assistance with ambulation. Neuro:  Denies   syncopal episodes,  vertigo, arthralgia, myalgia     Behavioral/Psych:  Pt denies current feeling of depression or significant anxiety. GENERAL PHYSICAL EXAM:            Vitals: BP (!) 149/86   Pulse 102   Temp 98.1 °F (36.7 °C) (Oral)   Resp 18   Ht 5' 1.5\" (1.562 m)   Wt 168 lb 3.4 oz (76.3 kg)   SpO2 96%   BMI 31.27 kg/m²  Body mass index is 31.27 kg/m². GENERAL APPEARANCE:  Minor Begun is a 59 y.o. female,  nourished, conscious, alert. Does not appear to be in distress or pain at this time. Skin:  Appears warm and dry without jaundice or cyanosis. No rashes or lesions. HEENT:  No facial swelling or tenderness. No  scleral icterus. No drainage from the ears, eyes or nose. Moist mucous membranes. No jugular vein distention. Carotid pulses present without bruit. Chest:  Symmetrical with equal expansion. Heart:  Regular rate and rhythm without murmur or rub. No extra heart sounds.     Lungs: Clear to ausculation without rhonchi or wheeze. Nonlabored. Abdomen:  Soft, nontender. No flank pain with percussion. Lymphatics:  No palpable cervical or supraclavicular lymphadenopathy. Extremities:  Radial pulses 2+. Pedal pulses 2+. No edema. No calf tenderness. Negative caitlins sign. Locomotor/ Back/Spine:  No para spinus tenderness. 5/5 strength upper and lower extremities. Neurological/Behavioral  Alert and oriented. Able to move all extremities. No facial droop. No slurred speech. Cranial nerves 2-12  grossly intact.                           Patient Active Problem List    Diagnosis Date Noted    Breast lump on right side at 10 o'clock position 12/27/2017    Fibrocystic breast changes of both breasts 12/27/2017    Cervical spondylosis with radiculopathy 09/06/2017    Hx of fusion of cervical spine 08/04/2017    RLS (restless legs syndrome) 05/17/2017    Chronic prescription benzodiazepine use 01/04/2017    Lower abdominal pain 11/08/2016    Bloating 11/08/2016    Left lumbar radiculitis 02/29/2016    Chronic sacroiliac joint pain 01/25/2016    Cervical spondylosis 07/20/2015    Diverticulosis of colon 05/21/2015    Lack of concentration 04/23/2015    MI, old 03/12/2015    Cervical stenosis of spine 11/06/2014    Chronic use of opiate drugs therapeutic purposes 11/06/2014    Lumbar spondylosis 09/04/2014    Lumbar disc herniation with radiculopathy 07/10/2014    Lumbar facet arthropathy 05/21/2014    Bulge of lumbar disc without myelopathy 04/09/2014    Lumbar radicular pain 04/09/2014    Cervical radicular pain 03/26/2014    Foraminal stenosis of cervical region 03/26/2014    H/O cervical spine surgery 02/25/2014    Cervicalgia 02/25/2014    Arm numbness 02/25/2014    COPD (chronic obstructive pulmonary disease) ([facility] [address] 12/05/2013    HTN (hypertension) 12/05/2013    Dyslipidemia 12/05/2013    GERD (gastroesophageal reflux disease) 12/05/2013    CAD (coronary artery disease) 12/05/2013    Depression 12/05/2013    Anxiety 12/05/2013               Nilsa Henderson CNP on 1/29/2018 at 8:17 AM    Patient seen preop, chart reviewed, AAO x 3, in NAD, VSS,. No changes in medical history since last evaluation. Risk / Benefits explained to patient, patient agree to proceed with plan.   ASA 2  MP 2

## 2018-02-14 ENCOUNTER — OFFICE VISIT (OUTPATIENT)
Dept: PAIN MANAGEMENT | Age: 63
End: 2018-02-14
Payer: MEDICARE

## 2018-02-14 VITALS
HEIGHT: 62 IN | HEART RATE: 101 BPM | DIASTOLIC BLOOD PRESSURE: 84 MMHG | TEMPERATURE: 97.2 F | RESPIRATION RATE: 16 BRPM | WEIGHT: 169 LBS | BODY MASS INDEX: 31.1 KG/M2 | SYSTOLIC BLOOD PRESSURE: 130 MMHG

## 2018-02-14 DIAGNOSIS — T40.2X5A CONSTIPATION DUE TO OPIOID THERAPY: ICD-10-CM

## 2018-02-14 DIAGNOSIS — M51.16 LUMBAR DISC HERNIATION WITH RADICULOPATHY: Chronic | ICD-10-CM

## 2018-02-14 DIAGNOSIS — K59.03 CONSTIPATION DUE TO OPIOID THERAPY: ICD-10-CM

## 2018-02-14 DIAGNOSIS — M47.22 CERVICAL SPONDYLOSIS WITH RADICULOPATHY: Primary | Chronic | ICD-10-CM

## 2018-02-14 DIAGNOSIS — Z79.891 CHRONIC USE OF OPIATE DRUGS THERAPEUTIC PURPOSES: ICD-10-CM

## 2018-02-14 DIAGNOSIS — Z98.890 H/O CERVICAL SPINE SURGERY: ICD-10-CM

## 2018-02-14 PROCEDURE — 4004F PT TOBACCO SCREEN RCVD TLK: CPT | Performed by: ANESTHESIOLOGY

## 2018-02-14 PROCEDURE — G8417 CALC BMI ABV UP PARAM F/U: HCPCS | Performed by: ANESTHESIOLOGY

## 2018-02-14 PROCEDURE — 3017F COLORECTAL CA SCREEN DOC REV: CPT | Performed by: ANESTHESIOLOGY

## 2018-02-14 PROCEDURE — 99214 OFFICE O/P EST MOD 30 MIN: CPT | Performed by: ANESTHESIOLOGY

## 2018-02-14 PROCEDURE — G8598 ASA/ANTIPLAT THER USED: HCPCS | Performed by: ANESTHESIOLOGY

## 2018-02-14 PROCEDURE — G8427 DOCREV CUR MEDS BY ELIG CLIN: HCPCS | Performed by: ANESTHESIOLOGY

## 2018-02-14 PROCEDURE — G8484 FLU IMMUNIZE NO ADMIN: HCPCS | Performed by: ANESTHESIOLOGY

## 2018-02-14 PROCEDURE — 3014F SCREEN MAMMO DOC REV: CPT | Performed by: ANESTHESIOLOGY

## 2018-02-14 RX ORDER — BUPROPION HCL 300 MG
TABLET, EXTENDED RELEASE 24 HR ORAL
COMMUNITY
Start: 2018-01-22 | End: 2018-04-03 | Stop reason: CLARIF

## 2018-02-14 RX ORDER — HYDROCODONE BITARTRATE AND ACETAMINOPHEN 7.5; 325 MG/1; MG/1
1 TABLET ORAL EVERY 8 HOURS PRN
Qty: 90 TABLET | Refills: 0 | Status: SHIPPED | OUTPATIENT
Start: 2018-02-20 | End: 2018-03-19 | Stop reason: SDUPTHER

## 2018-02-14 RX ORDER — GABAPENTIN 300 MG/1
CAPSULE ORAL
Qty: 90 CAPSULE | Refills: 1 | Status: SHIPPED | OUTPATIENT
Start: 2018-02-14 | End: 2018-04-11 | Stop reason: SDUPTHER

## 2018-02-14 ASSESSMENT — ENCOUNTER SYMPTOMS
SINUS PAIN: 1
COUGH: 1
SINUS PRESSURE: 1
SORE THROAT: 1
CONSTIPATION: 1

## 2018-02-14 NOTE — PROGRESS NOTES
Medications, reviewed and updated in EPIC as indicated    Review of Systems   Constitutional: Positive for chills and fatigue. HENT: Positive for congestion, ear pain, sinus pain, sinus pressure and sore throat. Respiratory: Positive for cough. Cardiovascular: Negative. Gastrointestinal: Positive for constipation. Musculoskeletal: Positive for neck pain and neck stiffness. Neurological: Positive for weakness (over all weakness ) and headaches. Hematological: Negative. Psychiatric/Behavioral: Positive for decreased concentration, dysphoric mood and sleep disturbance. The patient is nervous/anxious (anxious). Objective:   Physical Exam   Constitutional: She is oriented to person, place, and time. She appears well-developed and well-nourished. No distress. HENT:   Head: Normocephalic and atraumatic. Eyes: Conjunctivae and EOM are normal. Pupils are equal, round, and reactive to light. Cardiovascular: Normal rate, regular rhythm and normal heart sounds. Pulmonary/Chest: Effort normal and breath sounds normal.   Musculoskeletal:        Cervical back: She exhibits decreased range of motion, tenderness and pain. Neurological: She is alert and oriented to person, place, and time. She displays no atrophy and no tremor. No cranial nerve deficit or sensory deficit. She exhibits normal muscle tone. She displays no seizure activity. Coordination and gait normal.   Skin: Skin is warm and dry. Psychiatric: She has a normal mood and affect. Her behavior is normal. Judgment and thought content normal.   Nursing note and vitals reviewed.       Assessment:      PMH of HTN, COPD, CAD, Depression,     --Chronic Neck pain :  stared w/o any particular injury, insidious onset, progressively worsened, aggravated with activity, affecting quality of life, tried lifestyle modification, TENS therapy and NSAID, no previous PT,  Past history significant for cervical spine surgery in 2000 at [facility] for up to 30 days Do not fill before 2/21/18. Earliest Fill Date: 2/20/18     Dispense:  90 tablet     Refill:  0    gabapentin (NEURONTIN) 300 MG capsule     Sig: TAKE 1 CAPSULE BY MOUTH THREE TIMES DAILY. Dispense:  90 capsule     Refill:  1    Methylnaltrexone Bromide (RELISTOR) 12 MG/0.6ML KIT     Sig: Inject 0.6 mLs into the skin daily as needed (constipation)     Dispense:  7 kit     Refill:  1       Controlled Substances Monitoring: The Prescription Monitoring Report for this patient was reviewed today. Skye Gabriel MD)    No signs of potential drug abuse or diversion identified. Skye Gabriel MD)         Functional status reviewed - continues with improved or maintaining ADL's. Skye Gabriel MD)    Existing medication contract.  Skye Gabriel MD)

## 2018-02-21 ENCOUNTER — OFFICE VISIT (OUTPATIENT)
Dept: FAMILY MEDICINE CLINIC | Age: 63
End: 2018-02-21
Payer: MEDICARE

## 2018-02-21 VITALS
HEART RATE: 99 BPM | HEIGHT: 61 IN | TEMPERATURE: 98.3 F | WEIGHT: 170 LBS | SYSTOLIC BLOOD PRESSURE: 118 MMHG | DIASTOLIC BLOOD PRESSURE: 76 MMHG | BODY MASS INDEX: 32.1 KG/M2 | OXYGEN SATURATION: 97 % | RESPIRATION RATE: 20 BRPM

## 2018-02-21 DIAGNOSIS — R06.2 WHEEZE: ICD-10-CM

## 2018-02-21 DIAGNOSIS — R05.8 COUGH PRODUCTIVE OF PURULENT SPUTUM: Primary | ICD-10-CM

## 2018-02-21 DIAGNOSIS — J40 BRONCHITIS: ICD-10-CM

## 2018-02-21 PROCEDURE — G8484 FLU IMMUNIZE NO ADMIN: HCPCS | Performed by: NURSE PRACTITIONER

## 2018-02-21 PROCEDURE — 3014F SCREEN MAMMO DOC REV: CPT | Performed by: NURSE PRACTITIONER

## 2018-02-21 PROCEDURE — 3017F COLORECTAL CA SCREEN DOC REV: CPT | Performed by: NURSE PRACTITIONER

## 2018-02-21 PROCEDURE — G8598 ASA/ANTIPLAT THER USED: HCPCS | Performed by: NURSE PRACTITIONER

## 2018-02-21 PROCEDURE — G8417 CALC BMI ABV UP PARAM F/U: HCPCS | Performed by: NURSE PRACTITIONER

## 2018-02-21 PROCEDURE — 4004F PT TOBACCO SCREEN RCVD TLK: CPT | Performed by: NURSE PRACTITIONER

## 2018-02-21 PROCEDURE — G8427 DOCREV CUR MEDS BY ELIG CLIN: HCPCS | Performed by: NURSE PRACTITIONER

## 2018-02-21 PROCEDURE — 99213 OFFICE O/P EST LOW 20 MIN: CPT | Performed by: NURSE PRACTITIONER

## 2018-02-21 RX ORDER — PREDNISONE 20 MG/1
TABLET ORAL
Qty: 18 TABLET | Refills: 0 | Status: SHIPPED | OUTPATIENT
Start: 2018-02-21 | End: 2018-03-03

## 2018-02-21 RX ORDER — DULOXETIN HYDROCHLORIDE 30 MG/1
30 CAPSULE, DELAYED RELEASE ORAL DAILY
Qty: 30 CAPSULE | Refills: 5 | Status: CANCELLED | OUTPATIENT
Start: 2018-02-21

## 2018-02-21 RX ORDER — DULOXETIN HYDROCHLORIDE 60 MG/1
60 CAPSULE, DELAYED RELEASE ORAL DAILY
Qty: 30 CAPSULE | Refills: 5 | Status: CANCELLED | OUTPATIENT
Start: 2018-02-21

## 2018-02-21 RX ORDER — ALBUTEROL SULFATE 90 UG/1
2 AEROSOL, METERED RESPIRATORY (INHALATION) EVERY 6 HOURS PRN
Qty: 3 INHALER | Refills: 1 | Status: SHIPPED | OUTPATIENT
Start: 2018-02-21 | End: 2018-05-08

## 2018-02-21 RX ORDER — LEVALBUTEROL TARTRATE 45 UG/1
1 AEROSOL, METERED ORAL EVERY 4 HOURS PRN
Qty: 1 INHALER | Refills: 5 | Status: SHIPPED | OUTPATIENT
Start: 2018-02-21 | End: 2018-05-08

## 2018-02-21 RX ORDER — DOXYCYCLINE HYCLATE 100 MG/1
100 CAPSULE ORAL 2 TIMES DAILY
Qty: 20 CAPSULE | Refills: 0 | Status: SHIPPED | OUTPATIENT
Start: 2018-02-21 | End: 2018-03-03

## 2018-02-21 ASSESSMENT — ENCOUNTER SYMPTOMS
COUGH: 1
WHEEZING: 1
RHINORRHEA: 1
SHORTNESS OF BREATH: 0

## 2018-02-21 NOTE — PROGRESS NOTES
SHORTNESS OF BREATH 60 vial 2    FLOVENT  MCG/ACT inhaler INHALE 2 PUFFS INTO THE LUNGS TWICE DAILY 12 g 5    cycloSPORINE (RESTASIS) 0.05 % ophthalmic emulsion Place 1 drop into both eyes 2 times daily 1 each 3    ranitidine (ZANTAC) 300 MG tablet TAKE 1 TABLET BY MOUTH NIGHTLY 90 tablet 1    meloxicam (MOBIC) 15 MG tablet TAKE 1 TABLET BY MOUTH DAILY 30 tablet 5    cetirizine (ZYRTEC) 10 MG tablet TAKE 1 TABLET BY MOUTH DAILY 90 tablet 1    ALPRAZolam (XANAX) 1 MG tablet Take by mouth as needed .  omeprazole (PRILOSEC) 40 MG delayed release capsule Take by mouth daily       NIFEdipine (PROCARDIA XL) 60 MG extended release tablet Take 60 mg by mouth 2 times daily       DULoxetine (CYMBALTA) 30 MG extended release capsule Take 1 capsule by mouth daily 30 capsule 5    DULoxetine (CYMBALTA) 60 MG extended release capsule Take 1 capsule by mouth daily 30 capsule 5    mometasone (ELOCON) 0.1 % cream APPLY TOPICALLY DAILY 30 g 1    rOPINIRole (REQUIP) 3 MG tablet 1 tablet nightly      fluticasone (FLONASE) 50 MCG/ACT nasal spray INSTILL 1 SPRAY IN EACH NOSTRIL DAILY 16 g 5    losartan (COZAAR) 100 MG tablet Take 1 tablet by mouth daily      aspirin 81 MG tablet Take 81 mg by mouth daily      Nebulizers (COMPRESSOR/NEBULIZER) MISC Use with albuterol solution every 6 hours as needed. 1 each 0    varenicline (CHANTIX STARTING MONTH REYES) 0.5 MG X 11 & 1 MG X 42 tablet Take by mouth.  1 each 0    Tiotropium Bromide-Olodaterol 2.5-2.5 MCG/ACT AERS Inhale 2 puffs into the lungs daily 1 Inhaler 5     Current Facility-Administered Medications on File Prior to Visit   Medication Dose Route Frequency Provider Last Rate Last Dose    triamcinolone acetonide (KENALOG-40) injection 40 mg  40 mg Intramuscular Once Naomi Hernandez NP         Past Medical History:   Diagnosis Date    Anxiety     CAD (coronary artery disease)     Carotid artery stenosis     Cataract     [facility]) [facility] [address]     Depression     Esophageal reflux     Fibromyalgia     Head injury     Headache(784.0)     Hearing loss     Heart attack     Hyperlipidemia     Hypertension     Insomnia     Migraine     Osteoarthritis     Pneumonia     Reflux     TIA (transient ischemic attack)     Ulcerative colitis ([facility] [address]     Unspecified cerebral artery occlusion with cerebral infarction       Past Surgical History:   Procedure Laterality Date    ANESTHESIA NERVE BLOCK Left 11/13/2017    lumbar epidural steroid injection  performed by [facility]ita [facility]intero [facility] at [facility] Left 1/29/2018    NERVE BLOCK LEFT CERVICAL MEDIAL BRANCH C3 TON C4 C5 performed by Jesusita Quintero MD at [place] [address]  5/2012    severe spasms in the sigmoid colon , diverticulosis    COSMETIC SURGERY      cheek bone reconstruction     EPIDURAL STEROID INJECTION Left 9/25/2017    EPIDURAL STEROID INJECTION LEFT L4 L5  performed by Jesusita Quintero MD at   06/21/2014    lumbar CARLEY     LUMBAR SPINE SURGERY  05/05/2014    lumbar CARLEY     NECK SURGERY      cadaverbone placement    NECK SURGERY  06/07/2014    cervical CARLEY     NERVE BLOCK Left 4/7/14    CERVIAL NECK    NERVE BLOCK N/A 2/29/2016    LUMBAR CARLEY    NERVE BLOCK N/A 11/07/2016    LUMBAR CARLEY    NERVE BLOCK Left 11/13/2017    lumbar CARLEY    NERVE BLOCK Left 01/29/2018    C3-5    NERVE BLOCK Right 02/12/2018    cervical medial brach    OTHER SURGICAL HISTORY  12/15/2014    lumbar steroid injection    OTHER SURGICAL HISTORY  7-20-15    left cervical steroid injection    OTHER SURGICAL HISTORY  01-25-16    Bilateral sacroiliac joint injection     OTHER SURGICAL HISTORY  09/25/2017    Lumbar Epidural Steroid injection    CO INJECT NERV BLCK,CERV PLEXUS Left 2/12/2018    NERVE BLOCK LEFT CERVICAL MEDIAL BRANCH C3/TON/C4/C5 performed by Jesusita Quintero MD at [address] [place] [address] LIGATION  1980    UPPER GASTROINTESTINAL ENDOSCOPY  5/2012    normal    UPPER GASTROINTESTINAL ENDOSCOPY  7 17 15    biopsy, pathology-mild chronic inflammation     Family History   Problem Relation Age of Onset    Diabetes Mother     Alzheimer's Disease Mother     High Blood Pressure Father     Diabetes Sister     High Blood Pressure Sister     High Blood Pressure Brother     Diabetes Maternal Grandmother      Social History   Substance Use Topics    Smoking status: Current Every Day Smoker     Packs/day: 0.50     Years: 40.00     Types: Cigarettes    Smokeless tobacco: Former User      Comment: trying to quit smoking about 15 cigs a day - down to 8 a day- gradually decreasing    Alcohol use 0.0 oz/week      Comment: rarely once a year      No Known Allergies    Subjective:      Review of Systems   Constitutional: Negative for chills and fever. HENT: Positive for congestion and rhinorrhea. Respiratory: Positive for cough and wheezing. Negative for shortness of breath. Cardiovascular: Negative for chest pain, palpitations and leg swelling. Other pertinent ROS in HPI  Objective:     /76 (Site: Left Arm, Position: Sitting, Cuff Size: Medium Adult)   Pulse 99   Temp 98.3 °F (36.8 °C) (Oral)   Resp 20   Ht 5' 1.5\" (1.562 m)   Wt 170 lb (77.1 kg)   SpO2 97%   BMI 31.61 kg/m²    Physical Exam   Constitutional: She is oriented to person, place, and time. She appears well-developed and well-nourished. No distress. HENT:   Head: Normocephalic and atraumatic. Right Ear: Hearing, tympanic membrane, external ear and ear canal normal.   Left Ear: Hearing, tympanic membrane, external ear and ear canal normal.   Nose: Nose normal.   Mouth/Throat: Oropharynx is clear and moist.   Hearing aide to left ear   Eyes: Conjunctivae and EOM are normal. Pupils are equal, round, and reactive to light. Neck: Trachea normal, normal range of motion and full passive range of motion without pain.  No

## 2018-03-19 ENCOUNTER — TELEPHONE (OUTPATIENT)
Dept: FAMILY MEDICINE CLINIC | Age: 63
End: 2018-03-19

## 2018-03-19 DIAGNOSIS — M47.812 SPONDYLOSIS OF CERVICAL REGION WITHOUT MYELOPATHY OR RADICULOPATHY: Primary | Chronic | ICD-10-CM

## 2018-03-19 DIAGNOSIS — M51.16 LUMBAR DISC HERNIATION WITH RADICULOPATHY: Chronic | ICD-10-CM

## 2018-03-19 RX ORDER — HYDROCODONE BITARTRATE AND ACETAMINOPHEN 7.5; 325 MG/1; MG/1
1 TABLET ORAL EVERY 8 HOURS PRN
Qty: 90 TABLET | Refills: 0 | Status: SHIPPED | OUTPATIENT
Start: 2018-03-19 | End: 2018-04-11 | Stop reason: SDUPTHER

## 2018-03-20 DIAGNOSIS — K21.9 GASTROESOPHAGEAL REFLUX DISEASE WITHOUT ESOPHAGITIS: Primary | ICD-10-CM

## 2018-03-27 DIAGNOSIS — H04.129 DRY EYE: ICD-10-CM

## 2018-03-27 NOTE — TELEPHONE ENCOUNTER
LAST SEEN 2/21/18    Next Visit Date:  Future Appointments  Date Time Provider Seema Kilgorei   3/29/2018 8:00 AM Jeannette Renteria CNP [facility]   4/11/2018 11:30 AM MD Stephanie Mascorro [facility]       Health Maintenance   Topic Date Due    Shingles Vaccine (1 of 2 - 2 Dose Series) 01/16/2006    Potassium monitoring  01/24/2018    Creatinine monitoring  01/24/2018    Breast cancer screen  11/28/2018    Diabetes screen  01/24/2020    Cervical cancer screen  10/06/2021    Lipid screen  01/24/2022    Colon cancer screen colonoscopy  05/16/2022    DTaP/Tdap/Td vaccine (2 - Td) 12/27/2027    Flu vaccine  Completed    Pneumococcal med risk  Completed    Hepatitis C screen  Completed    HIV screen  Completed       Hemoglobin A1C (%)   Date Value   12/12/2012 5.2   12/16/2011 5.4   09/22/2011 5.2             ( goal A1C is < 7)   No results found for: LABMICR  LDL Cholesterol (mg/dL)   Date Value   01/24/2017 159 (H)       (goal LDL is <100)   AST (U/L)   Date Value   01/24/2017 19     ALT (U/L)   Date Value   01/24/2017 16     BUN (mg/dL)   Date Value   01/24/2017 13     BP Readings from Last 3 Encounters:   02/21/18 118/76   02/14/18 130/84   02/12/18 (!) 127/95          (goal 120/80)    All Future Testing planned in [facility]  Lab Frequency Next Occurrence   XR Knee Bilateral Standard Once 08/04/2017   TATI CAD DIAGNOSTIC Once 04/16/2018   Comprehensive Metabolic Panel Once 39/98/1764   Lipid Panel Once 04/16/2018   CBC Auto Differential Once 04/16/2018   TSH Once 04/16/2018   Vitamin D 25 Hydroxy Once 04/16/2018               Patient Active Problem List:     COPD (chronic obstructive pulmonary disease) (HCC)     HTN (hypertension)     Dyslipidemia     GERD (gastroesophageal reflux disease)     CAD (coronary artery disease)     Depression     Anxiety     H/O cervical spine surgery     Cervicalgia     Arm numbness     Cervical radicular pain     Foraminal stenosis of cervical region     Bulge

## 2018-03-28 RX ORDER — CYCLOSPORINE 0.5 MG/ML
EMULSION OPHTHALMIC
Qty: 60 EACH | Refills: 5 | Status: SHIPPED | OUTPATIENT
Start: 2018-03-28 | End: 2018-10-02 | Stop reason: SDUPTHER

## 2018-03-28 RX ORDER — IPRATROPIUM BROMIDE AND ALBUTEROL SULFATE 2.5; .5 MG/3ML; MG/3ML
SOLUTION RESPIRATORY (INHALATION)
Qty: 180 ML | Refills: 5 | Status: SHIPPED | OUTPATIENT
Start: 2018-03-28 | End: 2018-05-08

## 2018-03-29 ENCOUNTER — TELEPHONE (OUTPATIENT)
Dept: GASTROENTEROLOGY | Age: 63
End: 2018-03-29

## 2018-03-29 DIAGNOSIS — K21.9 GASTROESOPHAGEAL REFLUX DISEASE WITHOUT ESOPHAGITIS: Primary | ICD-10-CM

## 2018-04-03 ENCOUNTER — TELEPHONE (OUTPATIENT)
Dept: FAMILY MEDICINE CLINIC | Age: 63
End: 2018-04-03

## 2018-04-03 ENCOUNTER — OFFICE VISIT (OUTPATIENT)
Dept: FAMILY MEDICINE CLINIC | Age: 63
End: 2018-04-03
Payer: MEDICARE

## 2018-04-03 VITALS
SYSTOLIC BLOOD PRESSURE: 120 MMHG | TEMPERATURE: 97.9 F | BODY MASS INDEX: 31.23 KG/M2 | OXYGEN SATURATION: 98 % | DIASTOLIC BLOOD PRESSURE: 70 MMHG | WEIGHT: 168 LBS | HEART RATE: 97 BPM

## 2018-04-03 DIAGNOSIS — M54.12 CERVICAL RADICULAR PAIN: Chronic | ICD-10-CM

## 2018-04-03 DIAGNOSIS — K46.9 HERNIA OF ABDOMINAL CAVITY: Primary | ICD-10-CM

## 2018-04-03 DIAGNOSIS — F17.210 CIGARETTE NICOTINE DEPENDENCE WITHOUT COMPLICATION: Primary | ICD-10-CM

## 2018-04-03 DIAGNOSIS — J44.9 CHRONIC OBSTRUCTIVE PULMONARY DISEASE, UNSPECIFIED COPD TYPE (HCC): ICD-10-CM

## 2018-04-03 PROBLEM — Z79.899 OTHER LONG TERM (CURRENT) DRUG THERAPY: Status: ACTIVE | Noted: 2017-01-04

## 2018-04-03 PROCEDURE — G8427 DOCREV CUR MEDS BY ELIG CLIN: HCPCS | Performed by: NURSE PRACTITIONER

## 2018-04-03 PROCEDURE — 3023F SPIROM DOC REV: CPT | Performed by: NURSE PRACTITIONER

## 2018-04-03 PROCEDURE — 99213 OFFICE O/P EST LOW 20 MIN: CPT | Performed by: NURSE PRACTITIONER

## 2018-04-03 PROCEDURE — G8598 ASA/ANTIPLAT THER USED: HCPCS | Performed by: NURSE PRACTITIONER

## 2018-04-03 PROCEDURE — 3017F COLORECTAL CA SCREEN DOC REV: CPT | Performed by: NURSE PRACTITIONER

## 2018-04-03 PROCEDURE — 4004F PT TOBACCO SCREEN RCVD TLK: CPT | Performed by: NURSE PRACTITIONER

## 2018-04-03 PROCEDURE — G8417 CALC BMI ABV UP PARAM F/U: HCPCS | Performed by: NURSE PRACTITIONER

## 2018-04-03 PROCEDURE — G8926 SPIRO NO PERF OR DOC: HCPCS | Performed by: NURSE PRACTITIONER

## 2018-04-03 PROCEDURE — 3014F SCREEN MAMMO DOC REV: CPT | Performed by: NURSE PRACTITIONER

## 2018-04-03 RX ORDER — MELOXICAM 15 MG/1
TABLET ORAL
Qty: 90 TABLET | Refills: 1 | Status: SHIPPED | OUTPATIENT
Start: 2018-04-03 | End: 2018-06-19 | Stop reason: SDUPTHER

## 2018-04-03 RX ORDER — BUPROPION HYDROCHLORIDE 150 MG/1
300 TABLET ORAL DAILY
COMMUNITY
Start: 2018-03-12

## 2018-04-03 RX ORDER — CETIRIZINE HYDROCHLORIDE 10 MG/1
TABLET ORAL
Qty: 90 TABLET | Refills: 1 | Status: SHIPPED | OUTPATIENT
Start: 2018-04-03 | End: 2018-10-01 | Stop reason: SDUPTHER

## 2018-04-03 RX ORDER — ROPINIROLE 3 MG/1
3 TABLET, FILM COATED ORAL NIGHTLY
Qty: 90 TABLET | Refills: 1 | Status: SHIPPED | OUTPATIENT
Start: 2018-04-03 | End: 2018-10-01 | Stop reason: SDUPTHER

## 2018-04-03 RX ORDER — NICOTINE 21 MG/24HR
1 PATCH, TRANSDERMAL 24 HOURS TRANSDERMAL EVERY 24 HOURS
Qty: 30 PATCH | Refills: 3 | Status: SHIPPED | OUTPATIENT
Start: 2018-04-03 | End: 2018-07-19 | Stop reason: ALTCHOICE

## 2018-04-03 ASSESSMENT — ENCOUNTER SYMPTOMS
ABDOMINAL PAIN: 1
SHORTNESS OF BREATH: 0

## 2018-04-11 ENCOUNTER — OFFICE VISIT (OUTPATIENT)
Dept: PAIN MANAGEMENT | Age: 63
End: 2018-04-11
Payer: MEDICARE

## 2018-04-11 VITALS
WEIGHT: 174 LBS | TEMPERATURE: 97.4 F | HEART RATE: 88 BPM | SYSTOLIC BLOOD PRESSURE: 135 MMHG | RESPIRATION RATE: 20 BRPM | BODY MASS INDEX: 32.85 KG/M2 | DIASTOLIC BLOOD PRESSURE: 76 MMHG | HEIGHT: 61 IN | OXYGEN SATURATION: 98 %

## 2018-04-11 DIAGNOSIS — M51.16 LUMBAR DISC HERNIATION WITH RADICULOPATHY: Chronic | ICD-10-CM

## 2018-04-11 DIAGNOSIS — Z79.891 CHRONIC USE OF OPIATE DRUGS THERAPEUTIC PURPOSES: Primary | ICD-10-CM

## 2018-04-11 DIAGNOSIS — M47.812 SPONDYLOSIS OF CERVICAL REGION WITHOUT MYELOPATHY OR RADICULOPATHY: Chronic | ICD-10-CM

## 2018-04-11 DIAGNOSIS — Z98.890 H/O CERVICAL SPINE SURGERY: ICD-10-CM

## 2018-04-11 DIAGNOSIS — J44.9 CHRONIC OBSTRUCTIVE PULMONARY DISEASE, UNSPECIFIED COPD TYPE (HCC): ICD-10-CM

## 2018-04-11 PROCEDURE — 99213 OFFICE O/P EST LOW 20 MIN: CPT | Performed by: ANESTHESIOLOGY

## 2018-04-11 PROCEDURE — 3014F SCREEN MAMMO DOC REV: CPT | Performed by: ANESTHESIOLOGY

## 2018-04-11 PROCEDURE — G8427 DOCREV CUR MEDS BY ELIG CLIN: HCPCS | Performed by: ANESTHESIOLOGY

## 2018-04-11 PROCEDURE — 3017F COLORECTAL CA SCREEN DOC REV: CPT | Performed by: ANESTHESIOLOGY

## 2018-04-11 PROCEDURE — 4004F PT TOBACCO SCREEN RCVD TLK: CPT | Performed by: ANESTHESIOLOGY

## 2018-04-11 PROCEDURE — G8417 CALC BMI ABV UP PARAM F/U: HCPCS | Performed by: ANESTHESIOLOGY

## 2018-04-11 PROCEDURE — G8598 ASA/ANTIPLAT THER USED: HCPCS | Performed by: ANESTHESIOLOGY

## 2018-04-11 RX ORDER — BUDESONIDE AND FORMOTEROL FUMARATE DIHYDRATE 160; 4.5 UG/1; UG/1
AEROSOL RESPIRATORY (INHALATION)
Status: ON HOLD | COMMUNITY
Start: 2018-04-10 | End: 2018-10-15

## 2018-04-11 RX ORDER — HYDROCODONE BITARTRATE AND ACETAMINOPHEN 7.5; 325 MG/1; MG/1
1 TABLET ORAL EVERY 8 HOURS PRN
Qty: 90 TABLET | Refills: 0 | Status: SHIPPED | OUTPATIENT
Start: 2018-04-18 | End: 2018-05-14 | Stop reason: SDUPTHER

## 2018-04-11 RX ORDER — GABAPENTIN 300 MG/1
CAPSULE ORAL
Qty: 90 CAPSULE | Refills: 1 | Status: SHIPPED | OUTPATIENT
Start: 2018-04-11 | End: 2018-06-19 | Stop reason: SDUPTHER

## 2018-04-11 ASSESSMENT — ENCOUNTER SYMPTOMS
WHEEZING: 1
COUGH: 1
CONSTIPATION: 1
SHORTNESS OF BREATH: 1

## 2018-04-12 ENCOUNTER — TELEPHONE (OUTPATIENT)
Dept: FAMILY MEDICINE CLINIC | Age: 63
End: 2018-04-12

## 2018-04-12 RX ORDER — BUDESONIDE AND FORMOTEROL FUMARATE DIHYDRATE 160; 4.5 UG/1; UG/1
AEROSOL RESPIRATORY (INHALATION)
Qty: 10.2 G | OUTPATIENT
Start: 2018-04-12

## 2018-04-18 ENCOUNTER — OFFICE VISIT (OUTPATIENT)
Dept: SURGERY | Age: 63
End: 2018-04-18
Payer: MEDICARE

## 2018-04-18 VITALS
WEIGHT: 171 LBS | HEART RATE: 107 BPM | SYSTOLIC BLOOD PRESSURE: 146 MMHG | BODY MASS INDEX: 32.28 KG/M2 | TEMPERATURE: 98 F | DIASTOLIC BLOOD PRESSURE: 90 MMHG | HEIGHT: 61 IN

## 2018-04-18 DIAGNOSIS — K42.9 UMBILICAL HERNIA WITHOUT OBSTRUCTION AND WITHOUT GANGRENE: Primary | ICD-10-CM

## 2018-04-18 PROCEDURE — 4004F PT TOBACCO SCREEN RCVD TLK: CPT | Performed by: STUDENT IN AN ORGANIZED HEALTH CARE EDUCATION/TRAINING PROGRAM

## 2018-04-18 PROCEDURE — G8427 DOCREV CUR MEDS BY ELIG CLIN: HCPCS | Performed by: STUDENT IN AN ORGANIZED HEALTH CARE EDUCATION/TRAINING PROGRAM

## 2018-04-18 PROCEDURE — 99202 OFFICE O/P NEW SF 15 MIN: CPT | Performed by: STUDENT IN AN ORGANIZED HEALTH CARE EDUCATION/TRAINING PROGRAM

## 2018-04-18 PROCEDURE — G8417 CALC BMI ABV UP PARAM F/U: HCPCS | Performed by: STUDENT IN AN ORGANIZED HEALTH CARE EDUCATION/TRAINING PROGRAM

## 2018-04-18 PROCEDURE — G8598 ASA/ANTIPLAT THER USED: HCPCS | Performed by: STUDENT IN AN ORGANIZED HEALTH CARE EDUCATION/TRAINING PROGRAM

## 2018-04-18 PROCEDURE — 3017F COLORECTAL CA SCREEN DOC REV: CPT | Performed by: STUDENT IN AN ORGANIZED HEALTH CARE EDUCATION/TRAINING PROGRAM

## 2018-05-01 ENCOUNTER — PROCEDURE VISIT (OUTPATIENT)
Dept: FAMILY MEDICINE CLINIC | Age: 63
End: 2018-05-01
Payer: MEDICARE

## 2018-05-01 VITALS
WEIGHT: 170 LBS | HEART RATE: 101 BPM | SYSTOLIC BLOOD PRESSURE: 138 MMHG | HEIGHT: 60 IN | BODY MASS INDEX: 33.38 KG/M2 | DIASTOLIC BLOOD PRESSURE: 89 MMHG

## 2018-05-01 DIAGNOSIS — I10 ESSENTIAL HYPERTENSION: Primary | ICD-10-CM

## 2018-05-01 DIAGNOSIS — K42.9 UMBILICAL HERNIA WITHOUT OBSTRUCTION AND WITHOUT GANGRENE: ICD-10-CM

## 2018-05-01 DIAGNOSIS — L91.8 ACROCHORDON: ICD-10-CM

## 2018-05-01 PROCEDURE — G8598 ASA/ANTIPLAT THER USED: HCPCS | Performed by: NURSE PRACTITIONER

## 2018-05-01 PROCEDURE — 4004F PT TOBACCO SCREEN RCVD TLK: CPT | Performed by: NURSE PRACTITIONER

## 2018-05-01 PROCEDURE — G8417 CALC BMI ABV UP PARAM F/U: HCPCS | Performed by: NURSE PRACTITIONER

## 2018-05-01 PROCEDURE — 99214 OFFICE O/P EST MOD 30 MIN: CPT | Performed by: NURSE PRACTITIONER

## 2018-05-01 PROCEDURE — G8427 DOCREV CUR MEDS BY ELIG CLIN: HCPCS | Performed by: NURSE PRACTITIONER

## 2018-05-01 PROCEDURE — 11200 RMVL SKIN TAGS UP TO&INC 15: CPT | Performed by: NURSE PRACTITIONER

## 2018-05-01 PROCEDURE — 3017F COLORECTAL CA SCREEN DOC REV: CPT | Performed by: NURSE PRACTITIONER

## 2018-05-01 RX ORDER — HYDROCHLOROTHIAZIDE 12.5 MG/1
12.5 TABLET ORAL DAILY
Qty: 30 TABLET | Refills: 3 | Status: SHIPPED | OUTPATIENT
Start: 2018-05-01 | End: 2018-07-19

## 2018-05-01 ASSESSMENT — ENCOUNTER SYMPTOMS
SHORTNESS OF BREATH: 0
ABDOMINAL PAIN: 1

## 2018-05-14 DIAGNOSIS — M51.16 LUMBAR DISC HERNIATION WITH RADICULOPATHY: Chronic | ICD-10-CM

## 2018-05-14 DIAGNOSIS — M47.812 SPONDYLOSIS OF CERVICAL REGION WITHOUT MYELOPATHY OR RADICULOPATHY: Chronic | ICD-10-CM

## 2018-05-14 PROBLEM — K59.00 CONSTIPATED: Status: ACTIVE | Noted: 2018-05-14

## 2018-05-14 RX ORDER — HYDROCODONE BITARTRATE AND ACETAMINOPHEN 7.5; 325 MG/1; MG/1
1 TABLET ORAL EVERY 8 HOURS PRN
Qty: 90 TABLET | Refills: 0 | Status: SHIPPED | OUTPATIENT
Start: 2018-05-19 | End: 2018-06-19 | Stop reason: SDUPTHER

## 2018-06-01 DIAGNOSIS — J44.9 CHRONIC OBSTRUCTIVE PULMONARY DISEASE, UNSPECIFIED COPD TYPE (HCC): ICD-10-CM

## 2018-06-04 RX ORDER — RANITIDINE 300 MG/1
TABLET ORAL
Qty: 90 TABLET | Refills: 1 | Status: SHIPPED | OUTPATIENT
Start: 2018-06-04 | End: 2018-10-01 | Stop reason: SDUPTHER

## 2018-06-04 RX ORDER — TIOTROPIUM BROMIDE AND OLODATEROL 3.124; 2.736 UG/1; UG/1
2 SPRAY, METERED RESPIRATORY (INHALATION) DAILY
Qty: 3 INHALER | Refills: 1 | Status: ON HOLD | OUTPATIENT
Start: 2018-06-04 | End: 2018-10-15

## 2018-06-19 ENCOUNTER — OFFICE VISIT (OUTPATIENT)
Dept: PAIN MANAGEMENT | Age: 63
End: 2018-06-19
Payer: MEDICARE

## 2018-06-19 VITALS
HEART RATE: 107 BPM | HEIGHT: 61 IN | WEIGHT: 164.6 LBS | RESPIRATION RATE: 16 BRPM | OXYGEN SATURATION: 96 % | DIASTOLIC BLOOD PRESSURE: 88 MMHG | SYSTOLIC BLOOD PRESSURE: 138 MMHG | BODY MASS INDEX: 31.08 KG/M2

## 2018-06-19 DIAGNOSIS — M47.812 SPONDYLOSIS OF CERVICAL REGION WITHOUT MYELOPATHY OR RADICULOPATHY: Chronic | ICD-10-CM

## 2018-06-19 DIAGNOSIS — M54.12 CERVICAL RADICULAR PAIN: Chronic | ICD-10-CM

## 2018-06-19 DIAGNOSIS — M51.16 LUMBAR DISC HERNIATION WITH RADICULOPATHY: Chronic | ICD-10-CM

## 2018-06-19 PROCEDURE — 99213 OFFICE O/P EST LOW 20 MIN: CPT | Performed by: NURSE PRACTITIONER

## 2018-06-19 RX ORDER — HYDROCODONE BITARTRATE AND ACETAMINOPHEN 7.5; 325 MG/1; MG/1
1 TABLET ORAL EVERY 8 HOURS PRN
Qty: 90 TABLET | Refills: 0 | Status: SHIPPED | OUTPATIENT
Start: 2018-06-19 | End: 2018-07-19 | Stop reason: SDUPTHER

## 2018-06-19 RX ORDER — GABAPENTIN 300 MG/1
CAPSULE ORAL
Qty: 90 CAPSULE | Refills: 1 | Status: SHIPPED | OUTPATIENT
Start: 2018-06-19 | End: 2018-08-28 | Stop reason: SDUPTHER

## 2018-06-19 RX ORDER — MELOXICAM 15 MG/1
TABLET ORAL
Qty: 90 TABLET | Refills: 1 | Status: SHIPPED | OUTPATIENT
Start: 2018-06-19 | End: 2018-09-20 | Stop reason: SDUPTHER

## 2018-06-19 ASSESSMENT — ENCOUNTER SYMPTOMS
SHORTNESS OF BREATH: 0
COUGH: 0
BACK PAIN: 1
CONSTIPATION: 0

## 2018-06-23 ENCOUNTER — APPOINTMENT (OUTPATIENT)
Dept: GENERAL RADIOLOGY | Age: 63
End: 2018-06-23
Payer: MEDICARE

## 2018-06-23 ENCOUNTER — HOSPITAL ENCOUNTER (EMERGENCY)
Age: 63
Discharge: HOME | End: 2018-06-23
Attending: EMERGENCY MEDICINE
Payer: MEDICARE

## 2018-06-23 VITALS
TEMPERATURE: 98.1 F | BODY MASS INDEX: 30.96 KG/M2 | HEIGHT: 61 IN | RESPIRATION RATE: 24 BRPM | DIASTOLIC BLOOD PRESSURE: 85 MMHG | HEART RATE: 91 BPM | OXYGEN SATURATION: 94 % | WEIGHT: 164 LBS | SYSTOLIC BLOOD PRESSURE: 139 MMHG

## 2018-06-23 DIAGNOSIS — R07.9 CHEST PAIN, UNSPECIFIED TYPE: Primary | ICD-10-CM

## 2018-06-23 LAB
ABSOLUTE EOS #: 0.2 K/UL (ref 0–0.4)
ABSOLUTE IMMATURE GRANULOCYTE: NORMAL K/UL (ref 0–0.3)
ABSOLUTE LYMPH #: 1.5 K/UL (ref 1–4.8)
ABSOLUTE MONO #: 0.4 K/UL (ref 0.2–0.8)
ANION GAP SERPL CALCULATED.3IONS-SCNC: 16 MMOL/L (ref 9–17)
BASOPHILS # BLD: 1 % (ref 0–2)
BASOPHILS ABSOLUTE: 0.1 K/UL (ref 0–0.2)
BNP INTERPRETATION: NORMAL
BUN BLDV-MCNC: 8 MG/DL (ref 8–23)
BUN/CREAT BLD: 11 (ref 9–20)
CALCIUM SERPL-MCNC: 9.1 MG/DL (ref 8.6–10.4)
CHLORIDE BLD-SCNC: 95 MMOL/L (ref 98–107)
CO2: 21 MMOL/L (ref 20–31)
CREAT SERPL-MCNC: 0.73 MG/DL (ref 0.5–0.9)
DIFFERENTIAL TYPE: NORMAL
EKG ATRIAL RATE: 94 BPM
EKG P AXIS: 57 DEGREES
EKG P-R INTERVAL: 158 MS
EKG Q-T INTERVAL: 346 MS
EKG QRS DURATION: 82 MS
EKG QTC CALCULATION (BAZETT): 432 MS
EKG R AXIS: 65 DEGREES
EKG T AXIS: 40 DEGREES
EKG VENTRICULAR RATE: 94 BPM
EOSINOPHILS RELATIVE PERCENT: 3 % (ref 1–4)
GFR AFRICAN AMERICAN: >60 ML/MIN
GFR NON-AFRICAN AMERICAN: >60 ML/MIN
GFR SERPL CREATININE-BSD FRML MDRD: ABNORMAL ML/MIN/{1.73_M2}
GFR SERPL CREATININE-BSD FRML MDRD: ABNORMAL ML/MIN/{1.73_M2}
GLUCOSE BLD-MCNC: 109 MG/DL (ref 70–99)
HCT VFR BLD CALC: 37.8 % (ref 36–46)
HEMOGLOBIN: 12.7 G/DL (ref 12–16)
IMMATURE GRANULOCYTES: NORMAL %
LYMPHOCYTES # BLD: 27 % (ref 24–44)
MCH RBC QN AUTO: 29 PG (ref 26–34)
MCHC RBC AUTO-ENTMCNC: 33.6 G/DL (ref 31–37)
MCV RBC AUTO: 86.3 FL (ref 80–100)
MONOCYTES # BLD: 7 % (ref 1–7)
NRBC AUTOMATED: NORMAL PER 100 WBC
PDW BLD-RTO: 13.4 % (ref 11.5–14.5)
PLATELET # BLD: 326 K/UL (ref 130–400)
PLATELET ESTIMATE: NORMAL
PMV BLD AUTO: 8 FL (ref 6–12)
POTASSIUM SERPL-SCNC: 4.3 MMOL/L (ref 3.7–5.3)
PRO-BNP: 33 PG/ML
RBC # BLD: 4.38 M/UL (ref 4–5.2)
RBC # BLD: NORMAL 10*6/UL
SEG NEUTROPHILS: 62 % (ref 36–66)
SEGMENTED NEUTROPHILS ABSOLUTE COUNT: 3.4 K/UL (ref 1.8–7.7)
SODIUM BLD-SCNC: 132 MMOL/L (ref 135–144)
TROPONIN INTERP: NORMAL
TROPONIN T: <0.03 NG/ML
WBC # BLD: 5.6 K/UL (ref 3.5–11)
WBC # BLD: NORMAL 10*3/UL

## 2018-06-23 PROCEDURE — 71046 X-RAY EXAM CHEST 2 VIEWS: CPT

## 2018-06-23 PROCEDURE — 85025 COMPLETE CBC W/AUTO DIFF WBC: CPT

## 2018-06-23 PROCEDURE — 84484 ASSAY OF TROPONIN QUANT: CPT

## 2018-06-23 PROCEDURE — 99285 EMERGENCY DEPT VISIT HI MDM: CPT

## 2018-06-23 PROCEDURE — 93005 ELECTROCARDIOGRAM TRACING: CPT

## 2018-06-23 PROCEDURE — 83880 ASSAY OF NATRIURETIC PEPTIDE: CPT

## 2018-06-23 PROCEDURE — 80048 BASIC METABOLIC PNL TOTAL CA: CPT

## 2018-06-23 ASSESSMENT — PAIN DESCRIPTION - DESCRIPTORS: DESCRIPTORS: SHARP;STABBING

## 2018-06-23 ASSESSMENT — PAIN DESCRIPTION - LOCATION: LOCATION: BACK

## 2018-06-23 ASSESSMENT — PAIN DESCRIPTION - ORIENTATION: ORIENTATION: LEFT;MID

## 2018-06-23 ASSESSMENT — PAIN SCALES - GENERAL: PAINLEVEL_OUTOF10: 6

## 2018-06-25 ENCOUNTER — TELEPHONE (OUTPATIENT)
Dept: PAIN MANAGEMENT | Age: 63
End: 2018-06-25

## 2018-07-02 ENCOUNTER — TELEPHONE (OUTPATIENT)
Dept: FAMILY MEDICINE CLINIC | Age: 63
End: 2018-07-02

## 2018-07-02 RX ORDER — AMOXICILLIN AND CLAVULANATE POTASSIUM 875; 125 MG/1; MG/1
1 TABLET, FILM COATED ORAL EVERY 12 HOURS
Qty: 20 TABLET | Refills: 0 | Status: SHIPPED | OUTPATIENT
Start: 2018-07-02 | End: 2018-07-12

## 2018-07-02 NOTE — TELEPHONE ENCOUNTER
Called again. Pt was informed [facility] is still seeing pts for the day. I again offered the pt an appt in office. Pt declined.

## 2018-07-02 NOTE — TELEPHONE ENCOUNTER
Pt reports front and top of head feels full since last week (pressure like since 3-4 days). daria pot helps little. associated with yellowish green nasal drainage. declined appt because her COPD flares up in the hot/humid weather. Thinks she has sinus infection. Also c/o sharp pain left ear since 3 days    Asking for antibiotic to be called in.

## 2018-07-10 ENCOUNTER — TELEPHONE (OUTPATIENT)
Dept: FAMILY MEDICINE CLINIC | Age: 63
End: 2018-07-10

## 2018-07-10 DIAGNOSIS — F17.210 CIGARETTE NICOTINE DEPENDENCE WITHOUT COMPLICATION: Primary | ICD-10-CM

## 2018-07-10 RX ORDER — VARENICLINE TARTRATE 1 MG/1
1 TABLET, FILM COATED ORAL 2 TIMES DAILY
Qty: 60 TABLET | Refills: 4 | Status: ON HOLD | OUTPATIENT
Start: 2018-07-10 | End: 2018-10-15 | Stop reason: HOSPADM

## 2018-07-10 RX ORDER — VARENICLINE TARTRATE 25 MG
KIT ORAL
Qty: 1 EACH | Refills: 0 | Status: SHIPPED | OUTPATIENT
Start: 2018-07-10 | End: 2018-11-15

## 2018-07-10 NOTE — TELEPHONE ENCOUNTER
Asking if we could write RX: Chantix    Received samples in 2017, but has . Forward to: [facility]. Next Visit Date:  No future appointments.     Health Maintenance   Topic Date Due    Shingles Vaccine (1 of 2 - 2 Dose Series) 2019 (Originally 2006)    Flu vaccine (1) 2018    Breast cancer screen  2018    Potassium monitoring  2019    Creatinine monitoring  2019    Diabetes screen  2020    Cervical cancer screen  10/06/2021    Lipid screen  2022    Colon cancer screen colonoscopy  2022    DTaP/Tdap/Td vaccine (2 - Td) 2027    Pneumococcal med risk  Completed    Hepatitis C screen  Completed    HIV screen  Completed       Hemoglobin A1C (%)   Date Value   2012 5.2   2011 5.4   2011 5.2             ( goal A1C is < 7)   No results found for: LABMICR  LDL Cholesterol (mg/dL)   Date Value   2017 159 (H)   2014 210 (H)       (goal LDL is <100)   AST (U/L)   Date Value   2017 19     ALT (U/L)   Date Value   2017 16     BUN (mg/dL)   Date Value   2018 8     BP Readings from Last 3 Encounters:   18 139/85   18 138/88   18 138/89          (goal 120/80)    All Future Testing planned in [facility]  Lab Frequency Next Occurrence   XR Knee Bilateral Standard Once 2017   TATI CAD DIAGNOSTIC Once 2018   Comprehensive Metabolic Panel Once    Lipid Panel Once 2018   CBC Auto Differential Once 2018   TSH Once 2018   Vitamin D 25 Hydroxy Once 2018               Patient Active Problem List:     COPD (chronic obstructive pulmonary disease) (HCC)     HTN (hypertension)     Dyslipidemia     GERD (gastroesophageal reflux disease)     CAD (coronary artery disease)     Depression     Anxiety     H/O cervical spine surgery     Cervicalgia     Arm numbness     Cervical radicular pain     Foraminal stenosis of cervical region     Bulge of lumbar disc without myelopathy     Lumbar radicular pain     Lumbar facet arthropathy     Lumbar disc herniation with radiculopathy     Lumbar spondylosis     Cervical stenosis of spine     Chronic use of opiate drugs therapeutic purposes     MI, old     Lack of concentration     Diverticulosis of colon     Cervical spondylosis     Chronic sacroiliac joint pain     Left lumbar radiculitis     Lower abdominal pain     Bloating     Chronic prescription benzodiazepine use     RLS (restless legs syndrome)     Hx of fusion of cervical spine     Cervical spondylosis with radiculopathy     Breast lump on right side at 10 o'clock position     Fibrocystic breast changes of both breasts     Other intervertebral disc displacement, lumbar region     Other long term (current) drug therapy     Constipated

## 2018-07-19 ENCOUNTER — OFFICE VISIT (OUTPATIENT)
Dept: PAIN MANAGEMENT | Age: 63
End: 2018-07-19
Payer: MEDICARE

## 2018-07-19 VITALS
HEART RATE: 103 BPM | HEIGHT: 61 IN | TEMPERATURE: 98 F | DIASTOLIC BLOOD PRESSURE: 79 MMHG | RESPIRATION RATE: 16 BRPM | OXYGEN SATURATION: 98 % | SYSTOLIC BLOOD PRESSURE: 125 MMHG | BODY MASS INDEX: 30.51 KG/M2 | WEIGHT: 161.6 LBS

## 2018-07-19 DIAGNOSIS — M47.812 SPONDYLOSIS OF CERVICAL REGION WITHOUT MYELOPATHY OR RADICULOPATHY: Chronic | ICD-10-CM

## 2018-07-19 DIAGNOSIS — R20.0 ARM NUMBNESS: ICD-10-CM

## 2018-07-19 DIAGNOSIS — G89.29 ENCOUNTER FOR CHRONIC PAIN MANAGEMENT: Primary | ICD-10-CM

## 2018-07-19 DIAGNOSIS — Z79.891 CHRONIC USE OF OPIATE DRUGS THERAPEUTIC PURPOSES: ICD-10-CM

## 2018-07-19 DIAGNOSIS — Z98.1 HX OF FUSION OF CERVICAL SPINE: ICD-10-CM

## 2018-07-19 DIAGNOSIS — M51.16 LUMBAR DISC HERNIATION WITH RADICULOPATHY: Chronic | ICD-10-CM

## 2018-07-19 PROCEDURE — G8598 ASA/ANTIPLAT THER USED: HCPCS | Performed by: ANESTHESIOLOGY

## 2018-07-19 PROCEDURE — 99214 OFFICE O/P EST MOD 30 MIN: CPT | Performed by: ANESTHESIOLOGY

## 2018-07-19 PROCEDURE — 4004F PT TOBACCO SCREEN RCVD TLK: CPT | Performed by: ANESTHESIOLOGY

## 2018-07-19 PROCEDURE — 3017F COLORECTAL CA SCREEN DOC REV: CPT | Performed by: ANESTHESIOLOGY

## 2018-07-19 PROCEDURE — G8417 CALC BMI ABV UP PARAM F/U: HCPCS | Performed by: ANESTHESIOLOGY

## 2018-07-19 PROCEDURE — G8427 DOCREV CUR MEDS BY ELIG CLIN: HCPCS | Performed by: ANESTHESIOLOGY

## 2018-07-19 RX ORDER — HYDROCODONE BITARTRATE AND ACETAMINOPHEN 7.5; 325 MG/1; MG/1
1 TABLET ORAL EVERY 8 HOURS PRN
Qty: 90 TABLET | Refills: 0 | Status: SHIPPED | OUTPATIENT
Start: 2018-07-25 | End: 2018-08-20 | Stop reason: SDUPTHER

## 2018-07-19 ASSESSMENT — ENCOUNTER SYMPTOMS
SHORTNESS OF BREATH: 1
BACK PAIN: 1
COUGH: 1

## 2018-07-19 NOTE — PROGRESS NOTES
Musculoskeletal: Positive for back pain and neck pain. Neurological: Negative. Hematological: Negative. Psychiatric/Behavioral: Negative. Objective:   Physical Exam   Constitutional: She is oriented to person, place, and time. She appears well-developed and well-nourished. No distress. HENT:   Head: Normocephalic and atraumatic. Eyes: Conjunctivae and EOM are normal. Pupils are equal, round, and reactive to light. Cardiovascular: Normal rate, regular rhythm and normal heart sounds. Pulmonary/Chest: Effort normal and breath sounds normal.   Musculoskeletal:        Cervical back: She exhibits decreased range of motion, tenderness and pain. Lumbar back: She exhibits decreased range of motion, tenderness and pain. Neurological: She is alert and oriented to person, place, and time. Skin: Skin is warm and dry. Psychiatric: She has a normal mood and affect. Her behavior is normal. Thought content normal.   Nursing note and vitals reviewed. Assessment:      PMH of HTN, COPD, CAD, Depression,     --Chronic Neck pain :    located in the upper and mid cervical spine area left side more than right, extending over the occipital area causing chronic headache, pain aggravates with neck movement, no radiation of pain or paresthesia in arm. Describes the pain as constant aching throbbing pain sensation progressively worsening unresponsive to rest medication lifestyle changes and therapy. Past history significant for cervical spine surgery in 2000 at [facility] for neck pain and left arm symptoms, surgery was helpful.    s/p cervical ACDF C3/4 02/2017 by Dr Matias Wade at [facility]     cervical facet injection 02/2018    -- chronic low back pain  Back pain with radiation down both legs  Back pain is constant fluctuating in intensity leg pain is intermittent  No loss of bladder or bowel control  symptoms are stable at this time  MRI Lumbar spine 06/2014:Lumbar spondylosis without definite evidence for significant central canal stenosis. Was seen by Dr Samantha Celeste who did not recommend any lumbar spine surgery at this time     PAIN PROCEDURES:  Lumbar CARLEY 11/2017     Current medications include Norco, Mobic, Cymbalta, Requip and Neurontin      1. Encounter for chronic pain management    2. Spondylosis of cervical region without myelopathy or radiculopathy    3. Lumbar disc herniation with radiculopathy    4. Chronic use of opiate drugs therapeutic purposes              Plan:      Orders Placed This Encounter   Procedures    EMG     Standing Status:   Future     Standing Expiration Date:   9/17/2018     Order Specific Question:   Which body part? Answer:   both arms    AL INJ DX/THER AGNT PARAVERT FACET JOINT, CERV/THORAC, 1ST LEVEL     Right cervical facet steroid injection C2/34, C3/4 and C4/5     Standing Status:   Future     Standing Expiration Date:   10/19/2018    AL INJ DX/THER AGNT PARAVERT FACET JOINT, CERV/THORAC, 1ST LEVEL     Left cervical facet steroid injection C2/34, C3/4 and C4/5     Standing Status:   Future     Standing Expiration Date:   10/19/2018     Orders Placed This Encounter   Medications    HYDROcodone-acetaminophen (NORCO) 7.5-325 MG per tablet     Sig: Take 1 tablet by mouth every 8 hours as needed for Pain (do not fill before 07/26/2018) for up to 30 days. [facility] 7[facility]25[facility]     Dispense:  90 tablet     Refill:  0       Controlled Substances Monitoring:     RX Monitoring 7/19/2018   Attestation The Prescription Monitoring Report for this patient was reviewed today. Documentation No signs of potential drug abuse or diversion identified. Chronic Pain Reviewed the patient's functional status and documentation. Medication Contracts Existing medication contract.

## 2018-07-26 ENCOUNTER — TELEPHONE (OUTPATIENT)
Dept: PAIN MANAGEMENT | Age: 63
End: 2018-07-26

## 2018-07-31 DIAGNOSIS — F17.210 CIGARETTE NICOTINE DEPENDENCE WITHOUT COMPLICATION: ICD-10-CM

## 2018-08-01 RX ORDER — NICOTINE 21 MG/24HR
PATCH, TRANSDERMAL 24 HOURS TRANSDERMAL
Qty: 30 PATCH | OUTPATIENT
Start: 2018-08-01

## 2018-08-08 DIAGNOSIS — I10 ESSENTIAL HYPERTENSION: ICD-10-CM

## 2018-08-08 RX ORDER — HYDROCHLOROTHIAZIDE 12.5 MG/1
12.5 TABLET ORAL DAILY
Qty: 90 TABLET | Refills: 1 | OUTPATIENT
Start: 2018-08-08

## 2018-08-10 ENCOUNTER — TELEPHONE (OUTPATIENT)
Dept: PAIN MANAGEMENT | Age: 63
End: 2018-08-10

## 2018-08-16 DIAGNOSIS — M54.16 LUMBAR RADICULAR PAIN: Primary | Chronic | ICD-10-CM

## 2018-08-16 DIAGNOSIS — M51.16 LUMBAR DISC HERNIATION WITH RADICULOPATHY: Chronic | ICD-10-CM

## 2018-08-20 ENCOUNTER — HOSPITAL ENCOUNTER (OUTPATIENT)
Age: 63
Setting detail: OUTPATIENT SURGERY
Discharge: HOME | End: 2018-08-20
Attending: ANESTHESIOLOGY | Admitting: ANESTHESIOLOGY
Payer: MEDICARE

## 2018-08-20 ENCOUNTER — APPOINTMENT (OUTPATIENT)
Dept: GENERAL RADIOLOGY | Age: 63
End: 2018-08-20
Attending: ANESTHESIOLOGY
Payer: MEDICARE

## 2018-08-20 VITALS
OXYGEN SATURATION: 97 % | RESPIRATION RATE: 18 BRPM | HEART RATE: 92 BPM | TEMPERATURE: 97.9 F | SYSTOLIC BLOOD PRESSURE: 124 MMHG | DIASTOLIC BLOOD PRESSURE: 79 MMHG | WEIGHT: 157.41 LBS | BODY MASS INDEX: 28.97 KG/M2 | HEIGHT: 62 IN

## 2018-08-20 DIAGNOSIS — M47.812 SPONDYLOSIS OF CERVICAL REGION WITHOUT MYELOPATHY OR RADICULOPATHY: Chronic | ICD-10-CM

## 2018-08-20 DIAGNOSIS — M51.16 LUMBAR DISC HERNIATION WITH RADICULOPATHY: Chronic | ICD-10-CM

## 2018-08-20 PROCEDURE — 6360000002 HC RX W HCPCS: Performed by: ANESTHESIOLOGY

## 2018-08-20 PROCEDURE — 64490 INJ PARAVERT F JNT C/T 1 LEV: CPT | Performed by: ANESTHESIOLOGY

## 2018-08-20 PROCEDURE — 2709999900 HC NON-CHARGEABLE SUPPLY: Performed by: ANESTHESIOLOGY

## 2018-08-20 PROCEDURE — 99152 MOD SED SAME PHYS/QHP 5/>YRS: CPT | Performed by: ANESTHESIOLOGY

## 2018-08-20 PROCEDURE — 2580000003 HC RX 258: Performed by: ANESTHESIOLOGY

## 2018-08-20 PROCEDURE — 2500000003 HC RX 250 WO HCPCS: Performed by: ANESTHESIOLOGY

## 2018-08-20 PROCEDURE — 64492 INJ PARAVERT F JNT C/T 3 LEV: CPT | Performed by: ANESTHESIOLOGY

## 2018-08-20 PROCEDURE — 7100000010 HC PHASE II RECOVERY - FIRST 15 MIN: Performed by: ANESTHESIOLOGY

## 2018-08-20 PROCEDURE — 3600000051 HC PAIN LEVEL 1 ADDL 15 MIN: Performed by: ANESTHESIOLOGY

## 2018-08-20 PROCEDURE — 3600000050 HC PAIN LEVEL 1 BASE: Performed by: ANESTHESIOLOGY

## 2018-08-20 PROCEDURE — 6360000004 HC RX CONTRAST MEDICATION: Performed by: ANESTHESIOLOGY

## 2018-08-20 PROCEDURE — 7100000011 HC PHASE II RECOVERY - ADDTL 15 MIN: Performed by: ANESTHESIOLOGY

## 2018-08-20 PROCEDURE — 64491 INJ PARAVERT F JNT C/T 2 LEV: CPT | Performed by: ANESTHESIOLOGY

## 2018-08-20 PROCEDURE — 3209999900 FLUORO FOR SURGICAL PROCEDURES

## 2018-08-20 RX ORDER — DEXAMETHASONE SODIUM PHOSPHATE 10 MG/ML
INJECTION INTRAMUSCULAR; INTRAVENOUS PRN
Status: DISCONTINUED | OUTPATIENT
Start: 2018-08-20 | End: 2018-08-20 | Stop reason: HOSPADM

## 2018-08-20 RX ORDER — HYDROCODONE BITARTRATE AND ACETAMINOPHEN 7.5; 325 MG/1; MG/1
1 TABLET ORAL EVERY 8 HOURS PRN
Qty: 90 TABLET | Refills: 0 | Status: SHIPPED | OUTPATIENT
Start: 2018-08-24 | End: 2018-09-20 | Stop reason: SDUPTHER

## 2018-08-20 RX ORDER — BUPIVACAINE HYDROCHLORIDE 5 MG/ML
INJECTION, SOLUTION EPIDURAL; INTRACAUDAL PRN
Status: DISCONTINUED | OUTPATIENT
Start: 2018-08-20 | End: 2018-08-20 | Stop reason: HOSPADM

## 2018-08-20 RX ORDER — SODIUM CHLORIDE 0.9 % (FLUSH) 0.9 %
10 SYRINGE (ML) INJECTION EVERY 12 HOURS SCHEDULED
Status: DISCONTINUED | OUTPATIENT
Start: 2018-08-20 | End: 2018-08-20 | Stop reason: HOSPADM

## 2018-08-20 RX ORDER — SODIUM CHLORIDE 0.9 % (FLUSH) 0.9 %
10 SYRINGE (ML) INJECTION PRN
Status: DISCONTINUED | OUTPATIENT
Start: 2018-08-20 | End: 2018-08-20 | Stop reason: HOSPADM

## 2018-08-20 RX ORDER — MIDAZOLAM HYDROCHLORIDE 1 MG/ML
INJECTION INTRAMUSCULAR; INTRAVENOUS PRN
Status: DISCONTINUED | OUTPATIENT
Start: 2018-08-20 | End: 2018-08-20 | Stop reason: HOSPADM

## 2018-08-20 RX ORDER — FENTANYL CITRATE 50 UG/ML
INJECTION, SOLUTION INTRAMUSCULAR; INTRAVENOUS PRN
Status: DISCONTINUED | OUTPATIENT
Start: 2018-08-20 | End: 2018-08-20 | Stop reason: HOSPADM

## 2018-08-20 RX ADMIN — Medication 10 ML: at 08:28

## 2018-08-20 ASSESSMENT — PAIN DESCRIPTION - DESCRIPTORS: DESCRIPTORS: BURNING;DULL

## 2018-08-20 ASSESSMENT — PAIN DESCRIPTION - PAIN TYPE: TYPE: CHRONIC PAIN

## 2018-08-20 ASSESSMENT — PAIN - FUNCTIONAL ASSESSMENT: PAIN_FUNCTIONAL_ASSESSMENT: 0-10

## 2018-08-20 ASSESSMENT — PAIN DESCRIPTION - LOCATION: LOCATION: NECK

## 2018-08-20 ASSESSMENT — PAIN SCALES - GENERAL
PAINLEVEL_OUTOF10: 4
PAINLEVEL_OUTOF10: 5
PAINLEVEL_OUTOF10: 7

## 2018-08-20 NOTE — H&P
History and Physical Service   [employer]    HISTORY AND PHYSICAL EXAMINATION            Date of Evaluation: 8/20/2018  Patient name:  Cat Wyatt  MRN:   [id number]  YOB: 1955  PCP:    TAMI Alas CNP    History Obtained From:     Patient    History of Present Illness: This is Cat Wyatt a 59 y.o. female who presents today for a RIGHT CERVICAL FACET STEROID INJECTION  AT C 2/3, C3/4 AND C/4/5 by Dr. Nydia Barnes  for  CHRONIC CERVICAL NECK PAIN  WITH RADICULOPATHY. .    SHE HAS CHRONIC NECK PAIN WITH ASSOCIATED BILATERAL ARM NUMBNESS. THE PAIN HAS BEEN GETTING WORSE IN THE LAST SEVERAL MONTHS. SHE HAS HAD CERVICAL INJECTIONS WHICH WERE HELPFUL . SHE NOW PRESENTS FOR REPEAT INJECTIONS. SHE DOES NOT HAVE DIABETES. SHE TAKES ASA . SHE STOPPED THE ASA ONE WEEK AGO.       Past Medical History:     Past Medical History:   Diagnosis Date    Anxiety     CAD (coronary artery disease)     Carotid artery stenosis     Cataract     COPD (chronic obstructive pulmonary disease) (HCC)     Depression     Esophageal reflux     Fibromyalgia     Head injury     Headache(784.0)     Hearing loss     Heart attack ([address]     Hyperlipidemia     Hypertension     Insomnia     Migraine     Osteoarthritis     Pneumonia     Reflux     TIA (transient ischemic attack)     Ulcerative colitis ([address]     Unspecified cerebral artery occlusion with cerebral infarction         Past Surgical History:     Past Surgical History:   Procedure Laterality Date    ANESTHESIA NERVE BLOCK Left 11/13/2017    lumbar epidural steroid injection  performed by Aziza Jones MD at [facility] Left 1/29/2018    NERVE BLOCK LEFT CERVICAL MEDIAL BRANCH C3 TON C4 C5 performed by Aziza Jones MD at [place] [address] COLONOSCOPY  5/2012    severe spasms in the sigmoid colon , diverticulosis    COSMETIC SURGERY      cheek bone reconstruction     EPIDURAL albuterol solution every 6 hours as needed. 3/20/15  Yes Keerthi RECINOS TAMI Butler CNP   varenicline (CHANTIX CONTINUING MONTH REYES) 1 MG tablet Take 1 tablet by mouth 2 times daily 7/10/18   Contreras Juan TAMI Butler CNP   losartan (COZAAR) 100 MG tablet Take 1 tablet by mouth daily 10/16/15   Historical Provider, MD   aspirin 81 MG tablet Take 81 mg by mouth daily    Historical Provider, MD        Allergies:     Patient has no known allergies. Social History:     Tobacco:    reports that she has been smoking Cigarettes. She has a 20.00 pack-year smoking history. She has quit using smokeless tobacco.  Alcohol:      reports that she drinks alcohol. Drug Use:  reports that she does not use drugs. Family History:     Family History   Problem Relation Age of Onset    Diabetes Mother     Alzheimer's Disease Mother     High Blood Pressure Father     Diabetes Sister     High Blood Pressure Sister     High Blood Pressure Brother     Diabetes Maternal Grandmother        Review of Systems:     Positive and Negative as described in HPI. CONSTITUTIONAL:  negative for fevers, chills, sweats, fatigue, weight loss  HEENT:  negative for vision, hearing changes, runny nose, throat pain  RESPIRATORY:  negative for shortness of breath, cough, congestion, wheezing. CARDIOVASCULAR:  negative for chest pain, palpitations.   GASTROINTESTINAL:  negative for nausea, vomiting, diarrhea, constipation, change in bowel habits, abdominal pain   GENITOURINARY:  negative for difficulty of urination, burning with urination, frequency   INTEGUMENT:  negative for rash, skin lesions, easy bruising   HEMATOLOGIC/LYMPHATIC:  negative for swelling/edema   ALLERGIC/IMMUNOLOGIC:  negative for urticaria , itching  ENDOCRINE:  negative increase in drinking, increase in urination, hot or cold intolerance  MUSCULOSKELETAL:  negative joint pains, muscle aches, swelling of joints  NEUROLOGICAL:  negative for headaches, dizziness, lightheadedness, HAS NUMBNESS AND , pain,IN NECK AND ARMS DENIES tingling extremities  BEHAVIOR/PSYCH:  negative for depression, anxiety    Physical Exam:   /75   Pulse 102   Temp 98.1 °F (36.7 °C) (Oral)   Resp 16   Ht 5' 2\" (1.575 m)   Wt 157 lb 6.5 oz (71.4 kg)   SpO2 95%   BMI 28.79 kg/m²   No LMP recorded. Patient is postmenopausal.  No obstetric history on file. No results for input(s): POCGLU in the last 72 hours. General Appearance:  alert, well appearing, and in no acute distress  Mental status: oriented to person, place, and time with normal affect  Head:  normocephalic, atraumatic. Eye: no icterus, redness, pupils equal and reactive, extraocular eye movements intact, conjunctiva clear  Ear: normal external ear, no discharge, hearing intact  Nose:  no drainage noted  Mouth: mucous membranes moist  Neck: supple, no carotid bruits, thyroid not palpable  Lungs: Bilateral equal air entry, clear to ausculation, no wheezing, rales or rhonchi, normal effort  Cardiovascular: normal rate, regular rhythm, no murmur, gallop, rub. Abdomen: Soft, nontender, nondistended, normal bowel sounds, no hepatomegaly or splenomegaly  Neurologic: There are no new focal motor or sensory deficits, normal muscle tone and bulk, no abnormal sensation, normal speech, cranial nerves II through XII grossly intact  Skin: No gross lesions, rashes, bruising or bleeding on exposed skin area  Extremities:  peripheral pulses palpable, no pedal edema or calf pain with palpation  Psych: normal affect     Investigations:      Laboratory Testing:  No results found for this or any previous visit (from the past 24 hour(s)). No results for input(s): HGB, HCT, WBC, MCV, PLATELET, NA, K, CL, CO2, BUN, CREATININE, GLUCOSE, INR, PROTIME, APTT, AST, ALT, LABALBU, HCG in the last 720 hours. Imaging/Diagnostics:      Diagnosis:      1. SPONDYLOSIS OF CERVICAL REGION WITHOUT MYELOPATHY OR RADICULOPATHY. Plans:     1.  CERVICAL FACET STEROID

## 2018-08-21 ENCOUNTER — TELEPHONE (OUTPATIENT)
Dept: PAIN MANAGEMENT | Age: 63
End: 2018-08-21

## 2018-08-22 NOTE — TELEPHONE ENCOUNTER
As shown in routing history below  You created this encounter at 0951 am but did not route it to me until 426 pm yesterday  In future if you are routing a message with high alert so late in the evening.  Please call or text me to check epic for URGENT messages      I will follow up with patient today

## 2018-08-22 NOTE — TELEPHONE ENCOUNTER
DETAILED ROUTING HISTORY OF                                                 Telephone Encounter for [address] by: Johnathan Pearce Mayco Muñoz                 on Tue Aug 21, 2018  9:51 AM       Routed by: Shelbie MedaeRobin Berger Wanda                 on Tue Aug 21, 2018  4:27 PM       Routed to: MD Ameena Dang     on Tue Aug 21, 2018  4:27 PM         Opened by: Lisa Card MD                   on Wed Aug 22, 2018  8:46 AM       Pended by: Lisa Card MD                   on Wed Aug 22, 2018  8:47 AM                                       End of Report

## 2018-08-27 ENCOUNTER — TELEPHONE (OUTPATIENT)
Dept: PAIN MANAGEMENT | Age: 63
End: 2018-08-27

## 2018-08-28 ENCOUNTER — OFFICE VISIT (OUTPATIENT)
Dept: PAIN MANAGEMENT | Age: 63
End: 2018-08-28
Payer: MEDICARE

## 2018-08-28 VITALS
WEIGHT: 162.4 LBS | OXYGEN SATURATION: 96 % | HEIGHT: 61 IN | HEART RATE: 112 BPM | RESPIRATION RATE: 16 BRPM | DIASTOLIC BLOOD PRESSURE: 89 MMHG | SYSTOLIC BLOOD PRESSURE: 129 MMHG | BODY MASS INDEX: 30.66 KG/M2

## 2018-08-28 DIAGNOSIS — M47.812 SPONDYLOSIS OF CERVICAL REGION WITHOUT MYELOPATHY OR RADICULOPATHY: Chronic | ICD-10-CM

## 2018-08-28 DIAGNOSIS — M51.16 LUMBAR DISC HERNIATION WITH RADICULOPATHY: Chronic | ICD-10-CM

## 2018-08-28 PROCEDURE — 99213 OFFICE O/P EST LOW 20 MIN: CPT | Performed by: NURSE PRACTITIONER

## 2018-08-28 RX ORDER — GABAPENTIN 300 MG/1
CAPSULE ORAL
Qty: 90 CAPSULE | Refills: 1 | Status: SHIPPED | OUTPATIENT
Start: 2018-08-28 | End: 2018-10-22 | Stop reason: SDUPTHER

## 2018-08-28 ASSESSMENT — ENCOUNTER SYMPTOMS
BACK PAIN: 0
RESPIRATORY NEGATIVE: 1

## 2018-08-28 NOTE — PROGRESS NOTES
Subjective:      Patient ID: Radha Combs is a 59 y.o. female. Neck Pain    This is a chronic problem. The current episode started more than 1 year ago. The problem occurs constantly. The problem has been unchanged. The pain is present in the left side and midline. The quality of the pain is described as aching. The pain is at a severity of 7/10. The pain is moderate. The symptoms are aggravated by position and twisting. She has tried bed rest and oral narcotics for the symptoms. The treatment provided mild relief. Chief Complaint: neck pain   S/P: left cervical facet joint steroid injection done on 8/20/18     Outcome   Any improvement of activity? no   Any side effects (appetite,leg cramping,facial fleshing):flushing, bruised, headaches,stiffness, teeth hurt but has no teeth on the side that hurts, feels like her eyes are wide open all the time. Increase of pain:  Yes   Pain score Today: 10 for her headache and the soreness is at a 7   % of pain relief: 0%   Pain diary (medial branch block): No    Lab Results   Component Value Date    LABA1C 5.2 12/12/2012     Lab Results   Component Value Date     12/12/2012         Review of Systems   Constitutional: Negative. HENT: Negative. Respiratory: Negative. Musculoskeletal: Positive for arthralgias, joint swelling, neck pain and neck stiffness. Negative for back pain, gait problem and myalgias. Objective:   Physical Exam   Constitutional: She is oriented to person, place, and time. She appears well-developed and well-nourished. No distress. HENT:   Head: Normocephalic and atraumatic. Eyes: EOM are normal.   Neck: Normal range of motion. Pulmonary/Chest: Effort normal.   Musculoskeletal: Normal range of motion. Cervical back: She exhibits tenderness and pain. Neurological: She is alert and oriented to person, place, and time. She has normal strength. No cranial nerve deficit or sensory deficit.  Coordination and gait sources and denies use of illegal drugs. Patient denies side effects from medications like nausea, vomiting, constipation or drowsiness. Patient reports current activities of daily living are possible due to medications and would like to continue them. As always, we encourage daily stretching and strengthening exercises, and recommend minimizing use of pain medications unless patient cannot get through daily activities due to pain. Contract requirements met. Continue opioid therapy. Script written for norco  Advised patient to call the office if headache continues or neck pain worsens. Follow up appointment made for 4 weeks    Controlled Substances Monitoring:     RX Monitoring 8/28/2018   Attestation The Prescription Monitoring Report for this patient was reviewed today. Documentation Possible medication side effects, risk of tolerance/dependence & alternative treatments discussed. ;No signs of potential drug abuse or diversion identified. Chronic Pain -   Medication Contracts Existing medication contract.

## 2018-08-30 LAB
ALBUMIN SERPL-MCNC: 4 G/DL
ALP BLD-CCNC: 89 U/L
ALT SERPL-CCNC: 26 U/L
ANION GAP SERPL CALCULATED.3IONS-SCNC: ABNORMAL MMOL/L
AST SERPL-CCNC: 26 U/L
BILIRUB SERPL-MCNC: 0.2 MG/DL (ref 0.1–1.4)
BUN BLDV-MCNC: 12 MG/DL
CALCIUM SERPL-MCNC: 9.2 MG/DL
CHLORIDE BLD-SCNC: 105 MMOL/L
CHOLESTEROL, TOTAL: 243 MG/DL
CHOLESTEROL/HDL RATIO: 3.9
CO2: 25 MMOL/L
CREAT SERPL-MCNC: 0.69 MG/DL
GFR CALCULATED: ABNORMAL
GLUCOSE BLD-MCNC: 126 MG/DL
HDLC SERPL-MCNC: 62 MG/DL (ref 35–70)
LDL CHOLESTEROL CALCULATED: 145 MG/DL (ref 0–160)
POTASSIUM SERPL-SCNC: 4.3 MMOL/L
SODIUM BLD-SCNC: 139 MMOL/L
TOTAL PROTEIN: 6.8
TRIGL SERPL-MCNC: 180 MG/DL
TSH SERPL DL<=0.05 MIU/L-ACNC: 2.02 UIU/ML
VITAMIN D 25-HYDROXY: 24.4
VITAMIN D2, 25 HYDROXY: ABNORMAL
VITAMIN D3,25 HYDROXY: ABNORMAL
VLDLC SERPL CALC-MCNC: 36 MG/DL

## 2018-08-31 ENCOUNTER — TELEPHONE (OUTPATIENT)
Dept: PAIN MANAGEMENT | Age: 63
End: 2018-08-31

## 2018-08-31 NOTE — TELEPHONE ENCOUNTER
liza called and wanted to let dr Royal Birch know she was still in pain and still having headaches when she wakes up and when she goes to sleep. Her neck is still bruised and the pain is going down into her shoulder.  Her procedure was 8/20/18 left cervical facet joint steroid injection

## 2018-09-04 DIAGNOSIS — E78.2 MIXED HYPERLIPIDEMIA: ICD-10-CM

## 2018-09-04 DIAGNOSIS — J44.9 CHRONIC OBSTRUCTIVE PULMONARY DISEASE, UNSPECIFIED COPD TYPE (HCC): ICD-10-CM

## 2018-09-04 DIAGNOSIS — R53.83 FATIGUE, UNSPECIFIED TYPE: ICD-10-CM

## 2018-09-05 DIAGNOSIS — E55.9 VITAMIN D DEFICIENCY: Primary | ICD-10-CM

## 2018-09-20 ENCOUNTER — OFFICE VISIT (OUTPATIENT)
Dept: PAIN MANAGEMENT | Age: 63
End: 2018-09-20
Payer: MEDICARE

## 2018-09-20 VITALS
DIASTOLIC BLOOD PRESSURE: 88 MMHG | HEART RATE: 102 BPM | HEIGHT: 61 IN | WEIGHT: 160.2 LBS | SYSTOLIC BLOOD PRESSURE: 142 MMHG | OXYGEN SATURATION: 96 % | BODY MASS INDEX: 30.25 KG/M2 | RESPIRATION RATE: 16 BRPM

## 2018-09-20 DIAGNOSIS — G89.29 ENCOUNTER FOR CHRONIC PAIN MANAGEMENT: Primary | ICD-10-CM

## 2018-09-20 DIAGNOSIS — Z98.890 H/O CERVICAL SPINE SURGERY: ICD-10-CM

## 2018-09-20 DIAGNOSIS — M54.12 CERVICAL RADICULAR PAIN: Chronic | ICD-10-CM

## 2018-09-20 DIAGNOSIS — M47.817 LUMBOSACRAL SPONDYLOSIS WITHOUT MYELOPATHY: ICD-10-CM

## 2018-09-20 DIAGNOSIS — M51.16 LUMBAR DISC HERNIATION WITH RADICULOPATHY: Chronic | ICD-10-CM

## 2018-09-20 DIAGNOSIS — M47.812 SPONDYLOSIS OF CERVICAL REGION WITHOUT MYELOPATHY OR RADICULOPATHY: Chronic | ICD-10-CM

## 2018-09-20 DIAGNOSIS — Z79.891 CHRONIC USE OF OPIATE DRUGS THERAPEUTIC PURPOSES: ICD-10-CM

## 2018-09-20 DIAGNOSIS — Z79.899 CHRONIC PRESCRIPTION BENZODIAZEPINE USE: ICD-10-CM

## 2018-09-20 PROCEDURE — G8598 ASA/ANTIPLAT THER USED: HCPCS | Performed by: ANESTHESIOLOGY

## 2018-09-20 PROCEDURE — G8427 DOCREV CUR MEDS BY ELIG CLIN: HCPCS | Performed by: ANESTHESIOLOGY

## 2018-09-20 PROCEDURE — 4004F PT TOBACCO SCREEN RCVD TLK: CPT | Performed by: ANESTHESIOLOGY

## 2018-09-20 PROCEDURE — G8417 CALC BMI ABV UP PARAM F/U: HCPCS | Performed by: ANESTHESIOLOGY

## 2018-09-20 PROCEDURE — 99214 OFFICE O/P EST MOD 30 MIN: CPT | Performed by: ANESTHESIOLOGY

## 2018-09-20 PROCEDURE — 3017F COLORECTAL CA SCREEN DOC REV: CPT | Performed by: ANESTHESIOLOGY

## 2018-09-20 RX ORDER — TIZANIDINE 4 MG/1
4 TABLET ORAL NIGHTLY
Qty: 10 TABLET | Refills: 0 | Status: SHIPPED | OUTPATIENT
Start: 2018-09-20 | End: 2018-09-30

## 2018-09-20 RX ORDER — MELOXICAM 15 MG/1
TABLET ORAL
Qty: 90 TABLET | Refills: 1 | Status: SHIPPED | OUTPATIENT
Start: 2018-09-20 | End: 2018-11-01

## 2018-09-20 RX ORDER — HYDROCODONE BITARTRATE AND ACETAMINOPHEN 7.5; 325 MG/1; MG/1
1 TABLET ORAL EVERY 8 HOURS PRN
Qty: 90 TABLET | Refills: 0 | Status: SHIPPED | OUTPATIENT
Start: 2018-09-20 | End: 2018-10-22 | Stop reason: SDUPTHER

## 2018-09-20 ASSESSMENT — ENCOUNTER SYMPTOMS: BACK PAIN: 0

## 2018-09-25 ENCOUNTER — OFFICE VISIT (OUTPATIENT)
Dept: DERMATOLOGY | Age: 63
End: 2018-09-25
Payer: MEDICARE

## 2018-09-25 VITALS
HEIGHT: 61 IN | HEART RATE: 108 BPM | DIASTOLIC BLOOD PRESSURE: 85 MMHG | BODY MASS INDEX: 30.02 KG/M2 | WEIGHT: 159 LBS | OXYGEN SATURATION: 97 % | SYSTOLIC BLOOD PRESSURE: 132 MMHG

## 2018-09-25 DIAGNOSIS — L82.1 SEBORRHEIC KERATOSIS: ICD-10-CM

## 2018-09-25 DIAGNOSIS — L56.8 PHOTODERMATITIS: Primary | ICD-10-CM

## 2018-09-25 DIAGNOSIS — L73.8 SEBACEOUS HYPERPLASIA: ICD-10-CM

## 2018-09-25 PROCEDURE — 99202 OFFICE O/P NEW SF 15 MIN: CPT | Performed by: DERMATOLOGY

## 2018-09-25 PROCEDURE — G8427 DOCREV CUR MEDS BY ELIG CLIN: HCPCS | Performed by: DERMATOLOGY

## 2018-09-25 PROCEDURE — 4004F PT TOBACCO SCREEN RCVD TLK: CPT | Performed by: DERMATOLOGY

## 2018-09-25 PROCEDURE — 3017F COLORECTAL CA SCREEN DOC REV: CPT | Performed by: DERMATOLOGY

## 2018-09-25 PROCEDURE — G8598 ASA/ANTIPLAT THER USED: HCPCS | Performed by: DERMATOLOGY

## 2018-09-25 PROCEDURE — G8417 CALC BMI ABV UP PARAM F/U: HCPCS | Performed by: DERMATOLOGY

## 2018-10-01 ENCOUNTER — OFFICE VISIT (OUTPATIENT)
Dept: FAMILY MEDICINE CLINIC | Age: 63
End: 2018-10-01
Payer: MEDICARE

## 2018-10-01 ENCOUNTER — TELEPHONE (OUTPATIENT)
Dept: FAMILY MEDICINE CLINIC | Age: 63
End: 2018-10-01

## 2018-10-01 VITALS
SYSTOLIC BLOOD PRESSURE: 120 MMHG | WEIGHT: 158 LBS | OXYGEN SATURATION: 100 % | TEMPERATURE: 97.7 F | BODY MASS INDEX: 29.85 KG/M2 | DIASTOLIC BLOOD PRESSURE: 70 MMHG | HEART RATE: 62 BPM

## 2018-10-01 DIAGNOSIS — K59.00 CONSTIPATION, UNSPECIFIED CONSTIPATION TYPE: Primary | ICD-10-CM

## 2018-10-01 DIAGNOSIS — R14.0 BLOATING: ICD-10-CM

## 2018-10-01 DIAGNOSIS — Z23 NEED FOR INFLUENZA VACCINATION: ICD-10-CM

## 2018-10-01 DIAGNOSIS — G25.81 RLS (RESTLESS LEGS SYNDROME): ICD-10-CM

## 2018-10-01 DIAGNOSIS — K42.9 UMBILICAL HERNIA WITHOUT OBSTRUCTION AND WITHOUT GANGRENE: ICD-10-CM

## 2018-10-01 DIAGNOSIS — K43.9 VENTRAL HERNIA WITHOUT OBSTRUCTION OR GANGRENE: ICD-10-CM

## 2018-10-01 PROCEDURE — 4004F PT TOBACCO SCREEN RCVD TLK: CPT | Performed by: NURSE PRACTITIONER

## 2018-10-01 PROCEDURE — 90688 IIV4 VACCINE SPLT 0.5 ML IM: CPT | Performed by: NURSE PRACTITIONER

## 2018-10-01 PROCEDURE — G8417 CALC BMI ABV UP PARAM F/U: HCPCS | Performed by: NURSE PRACTITIONER

## 2018-10-01 PROCEDURE — G8427 DOCREV CUR MEDS BY ELIG CLIN: HCPCS | Performed by: NURSE PRACTITIONER

## 2018-10-01 PROCEDURE — 99214 OFFICE O/P EST MOD 30 MIN: CPT | Performed by: NURSE PRACTITIONER

## 2018-10-01 PROCEDURE — 3017F COLORECTAL CA SCREEN DOC REV: CPT | Performed by: NURSE PRACTITIONER

## 2018-10-01 PROCEDURE — 90471 IMMUNIZATION ADMIN: CPT | Performed by: NURSE PRACTITIONER

## 2018-10-01 PROCEDURE — G8598 ASA/ANTIPLAT THER USED: HCPCS | Performed by: NURSE PRACTITIONER

## 2018-10-01 PROCEDURE — G8482 FLU IMMUNIZE ORDER/ADMIN: HCPCS | Performed by: NURSE PRACTITIONER

## 2018-10-01 RX ORDER — ROPINIROLE 3 MG/1
3 TABLET, FILM COATED ORAL NIGHTLY
Qty: 90 TABLET | Refills: 1 | Status: SHIPPED | OUTPATIENT
Start: 2018-10-01 | End: 2019-03-18 | Stop reason: SDUPTHER

## 2018-10-01 RX ORDER — RANITIDINE 300 MG/1
TABLET ORAL
Qty: 90 TABLET | Refills: 1 | Status: SHIPPED | OUTPATIENT
Start: 2018-10-01 | End: 2019-03-18 | Stop reason: SDUPTHER

## 2018-10-01 RX ORDER — CETIRIZINE HYDROCHLORIDE 10 MG/1
TABLET ORAL
Qty: 90 TABLET | Refills: 1 | Status: SHIPPED | OUTPATIENT
Start: 2018-10-01 | End: 2019-03-18 | Stop reason: SDUPTHER

## 2018-10-01 ASSESSMENT — ENCOUNTER SYMPTOMS
COUGH: 1
BACK PAIN: 1
ABDOMINAL PAIN: 1

## 2018-10-01 ASSESSMENT — PATIENT HEALTH QUESTIONNAIRE - PHQ9
SUM OF ALL RESPONSES TO PHQ QUESTIONS 1-9: 2
SUM OF ALL RESPONSES TO PHQ QUESTIONS 1-9: 2
1. LITTLE INTEREST OR PLEASURE IN DOING THINGS: 1
2. FEELING DOWN, DEPRESSED OR HOPELESS: 1
SUM OF ALL RESPONSES TO PHQ9 QUESTIONS 1 & 2: 2

## 2018-10-01 NOTE — PROGRESS NOTES
INJECTION C2/34 C3/4 C4/5 performed by Emi Quigley MD at [address] Left 2/12/2018    NERVE BLOCK LEFT CERVICAL MEDIAL BRANCH C3/TON/C4/C5 performed by Emi Quigley MD at [address] [facility] [place] ENDOSCOPY  5/2012    normal    UPPER GASTROINTESTINAL ENDOSCOPY  7 17 15    biopsy, pathology-mild chronic inflammation       Family History   Problem Relation Age of Onset    Diabetes Mother     Alzheimer's Disease Mother     High Blood Pressure Father     Diabetes Sister     High Blood Pressure Sister     High Blood Pressure Brother     Diabetes Maternal Grandmother        Social History   Substance Use Topics    Smoking status: Current Every Day Smoker     Packs/day: 0.50     Years: 40.00     Types: Cigarettes    Smokeless tobacco: Former User      Comment: trying to quit smoking about 15 cigs a day - down to 8 a day- gradually decreasing    Alcohol use 0.0 oz/week      Comment: rarely once a year      Current Outpatient Prescriptions   Medication Sig Dispense Refill    cetirizine (ZYRTEC) 10 MG tablet TAKE 1 TABLET BY MOUTH DAILY 90 tablet 1    ranitidine (ZANTAC) 300 MG tablet TAKE 1 TABLET BY MOUTH NIGHTLY 90 tablet 1    rOPINIRole (REQUIP) 3 MG tablet Take 1 tablet by mouth nightly 90 tablet 1    fluocinonide (LIDEX) 0.05 % cream Apply topically 2 times daily. 60 g 2    meloxicam (MOBIC) 15 MG tablet TAKE 1 TABLET BY MOUTH DAILY 90 tablet 1    HYDROcodone-acetaminophen (NORCO) 7.5-325 MG per tablet Take 1 tablet by mouth every 8 hours as needed for Pain (do not fill before 09/23/2018) for up to 30 days. . 90 tablet 0    vitamin D (CHOLECALCIFEROL) 1000 UNIT TABS tablet Take 1 tablet by mouth daily 90 tablet 1    gabapentin (NEURONTIN) 300 MG capsule TAKE 1 CAPSULE BY MOUTH THREE TIMES DAILY. 90 capsule 1    varenicline (CHANTIX STARTING MONTH REYES) 0.5 MG X 11 & 1 MG X 42 tablet Take by mouth.  1 each 0    varenicline (CHANTIX CONTINUING MONTH REYES) 1 MG tablet Take 1 tablet by mouth 2 times daily 60 tablet 4    VENTOLIN  (90 Base) MCG/ACT inhaler INHALE 2 PUFFS BY MOUTH INTO THE LUNGS EVERY 6 HOURS AS NEEDED FOR WHEEZING 18 g 5    STIOLTO RESPIMAT 2.5-2.5 MCG/ACT AERS INHALE 2 PUFFS INTO THE LUNGS DAILY 3 Inhaler 1    SYMBICORT 160-4.5 MCG/ACT AERO       buPROPion (WELLBUTRIN XL) 150 MG extended release tablet Take 150 mg by mouth daily Taking 400 mg daily (2 of 200s)      RESTASIS 0.05 % ophthalmic emulsion PLACE 1 DROP INTO BOTH EYES TWICE DAILY 60 each 5    FLOVENT  MCG/ACT inhaler INHALE 2 PUFFS INTO THE LUNGS TWICE DAILY (Patient taking differently: INHALE 2 PUFFS INTO THE LUNGS TWICE DAILY PRN) 12 g 5    ALPRAZolam (XANAX) 1 MG tablet Take by mouth as needed .  omeprazole (PRILOSEC) 40 MG delayed release capsule Take by mouth daily       NIFEdipine (PROCARDIA XL) 60 MG extended release tablet Take 60 mg by mouth 2 times daily       DULoxetine (CYMBALTA) 30 MG extended release capsule Take 1 capsule by mouth daily 30 capsule 5    DULoxetine (CYMBALTA) 60 MG extended release capsule Take 1 capsule by mouth daily 30 capsule 5    mometasone (ELOCON) 0.1 % cream APPLY TOPICALLY DAILY 30 g 1    fluticasone (FLONASE) 50 MCG/ACT nasal spray INSTILL 1 SPRAY IN EACH NOSTRIL DAILY (Patient taking differently: 1 spray by Nasal route daily as needed INSTILL 1 SPRAY IN EACH NOSTRIL DAILY) 16 g 5    losartan (COZAAR) 100 MG tablet Take 1 tablet by mouth daily      aspirin 81 MG tablet Take 81 mg by mouth daily      Nebulizers (COMPRESSOR/NEBULIZER) MISC Use with albuterol solution every 6 hours as needed.  1 each 0     Current Facility-Administered Medications   Medication Dose Route Frequency Provider Last Rate Last Dose    triamcinolone acetonide (KENALOG-40) injection 40 mg  40 mg Intramuscular Once Dwain Butler, APRN - CNP         No Known Allergies    Health Maintenance   Topic Date Due    Shingles

## 2018-10-02 DIAGNOSIS — H04.129 DRY EYE: ICD-10-CM

## 2018-10-02 RX ORDER — IPRATROPIUM BROMIDE AND ALBUTEROL SULFATE 2.5; .5 MG/3ML; MG/3ML
SOLUTION RESPIRATORY (INHALATION)
Qty: 180 ML | Refills: 5 | Status: SHIPPED | OUTPATIENT
Start: 2018-10-02 | End: 2019-03-18 | Stop reason: SDUPTHER

## 2018-10-02 RX ORDER — CYCLOSPORINE 0.5 MG/ML
EMULSION OPHTHALMIC
Qty: 60 EACH | Refills: 5 | Status: SHIPPED | OUTPATIENT
Start: 2018-10-02 | End: 2019-03-18 | Stop reason: SDUPTHER

## 2018-10-02 NOTE — TELEPHONE ENCOUNTER
filled 3/2018 30 days with 5 RF  Seen yesterday    Next Visit Date:  Future Appointments  Date Time Provider Seema Triny   10/22/2018 10:40 AM TAMI Child CNP [facility] [address] Maintenance   Topic Date Due    Shingles Vaccine (1 of 2 - 2 Dose Series) 04/03/2019 (Originally 1/16/2006)    Breast cancer screen  11/28/2018    Potassium monitoring  08/30/2019    Creatinine monitoring  08/30/2019    Diabetes screen  01/24/2020    Cervical cancer screen  10/06/2021    Colon cancer screen colonoscopy  05/16/2022    Lipid screen  08/30/2023    DTaP/Tdap/Td vaccine (2 - Td) 12/27/2027    Flu vaccine  Completed    Pneumococcal med risk  Completed    Hepatitis C screen  Completed    HIV screen  Completed       Hemoglobin A1C (%)   Date Value   12/12/2012 5.2   12/16/2011 5.4   09/22/2011 5.2             ( goal A1C is < 7)   No results found for: LABMICR  LDL Cholesterol (mg/dL)   Date Value   01/24/2017 159 (H)   03/20/2014 210 (H)     LDL Calculated (mg/dL)   Date Value   08/30/2018 145       (goal LDL is <100)   AST (U/L)   Date Value   08/30/2018 26     ALT (U/L)   Date Value   08/30/2018 26     BUN (mg/dL)   Date Value   08/30/2018 12     BP Readings from Last 3 Encounters:   10/01/18 120/70   09/25/18 132/85   09/20/18 (!) 142/88          (goal 120/80)    All Future Testing planned in [facility]  Lab Frequency Next Occurrence   MT INJ DX/THER AGNT PARAVERT FACET JOINT, CERV/THORAC, 1ST LEVEL Once 07/20/2018   MT INJ DX/THER AGNT PARAVERT FACET JOINT, CERV/THORAC, 1ST LEVEL Once 07/20/2018   MT INJECT ANES/STEROID FORAMEN LUMBAR/SACRAL W IMG GUIDE ,1 LEVEL Once 08/17/2018   MT INJ DX/THER AGNT PARAVERT FACET JOINT, LUMBAR/SAC, 1ST LEVEL Once 09/21/2018               Patient Active Problem List:     Chronic obstructive pulmonary disease ([address]     HTN (hypertension)     Dyslipidemia     GERD (gastroesophageal reflux disease)     CAD (coronary artery disease)     Depression     Anxiety H/O cervical spine surgery     Cervicalgia     Arm numbness     Cervical radicular pain     Foraminal stenosis of cervical region     Bulge of lumbar disc without myelopathy     Lumbar radicular pain     Lumbar facet arthropathy     Lumbar disc herniation with radiculopathy     Lumbar spondylosis     Cervical stenosis of spine     Chronic use of opiate drugs therapeutic purposes     MI, old     Lack of concentration     Diverticulosis of colon     Cervical spondylosis     Chronic sacroiliac joint pain     Left lumbar radiculitis     Lower abdominal pain     Bloating     Chronic prescription benzodiazepine use     RLS (restless legs syndrome)     Hx of fusion of cervical spine     Cervical spondylosis with radiculopathy     Breast lump on right side at 10 o'clock position     Fibrocystic breast changes of both breasts     Other intervertebral disc displacement, lumbar region     Other long term (current) drug therapy     Constipated     Umbilical hernia without obstruction and without gangrene

## 2018-10-15 ENCOUNTER — APPOINTMENT (OUTPATIENT)
Dept: GENERAL RADIOLOGY | Age: 63
End: 2018-10-15
Attending: ANESTHESIOLOGY
Payer: MEDICARE

## 2018-10-15 ENCOUNTER — HOSPITAL ENCOUNTER (OUTPATIENT)
Age: 63
Setting detail: OUTPATIENT SURGERY
Discharge: HOME | End: 2018-10-15
Attending: ANESTHESIOLOGY | Admitting: ANESTHESIOLOGY
Payer: MEDICARE

## 2018-10-15 VITALS
OXYGEN SATURATION: 97 % | TEMPERATURE: 98.1 F | HEART RATE: 90 BPM | WEIGHT: 160 LBS | BODY MASS INDEX: 30.21 KG/M2 | DIASTOLIC BLOOD PRESSURE: 78 MMHG | RESPIRATION RATE: 16 BRPM | SYSTOLIC BLOOD PRESSURE: 136 MMHG | HEIGHT: 61 IN

## 2018-10-15 PROBLEM — M47.817 LUMBOSACRAL SPONDYLOSIS WITHOUT MYELOPATHY: Chronic | Status: ACTIVE | Noted: 2018-10-15

## 2018-10-15 PROCEDURE — 2500000003 HC RX 250 WO HCPCS: Performed by: ANESTHESIOLOGY

## 2018-10-15 PROCEDURE — 99152 MOD SED SAME PHYS/QHP 5/>YRS: CPT | Performed by: ANESTHESIOLOGY

## 2018-10-15 PROCEDURE — 6360000004 HC RX CONTRAST MEDICATION: Performed by: ANESTHESIOLOGY

## 2018-10-15 PROCEDURE — 64494 INJ PARAVERT F JNT L/S 2 LEV: CPT | Performed by: ANESTHESIOLOGY

## 2018-10-15 PROCEDURE — 2709999900 HC NON-CHARGEABLE SUPPLY: Performed by: ANESTHESIOLOGY

## 2018-10-15 PROCEDURE — 3600000050 HC PAIN LEVEL 1 BASE: Performed by: ANESTHESIOLOGY

## 2018-10-15 PROCEDURE — 3600000051 HC PAIN LEVEL 1 ADDL 15 MIN: Performed by: ANESTHESIOLOGY

## 2018-10-15 PROCEDURE — 64495 INJ PARAVERT F JNT L/S 3 LEV: CPT | Performed by: ANESTHESIOLOGY

## 2018-10-15 PROCEDURE — 7100000010 HC PHASE II RECOVERY - FIRST 15 MIN: Performed by: ANESTHESIOLOGY

## 2018-10-15 PROCEDURE — 64493 INJ PARAVERT F JNT L/S 1 LEV: CPT | Performed by: ANESTHESIOLOGY

## 2018-10-15 PROCEDURE — 3209999900 FLUORO FOR SURGICAL PROCEDURES

## 2018-10-15 PROCEDURE — 6360000002 HC RX W HCPCS: Performed by: ANESTHESIOLOGY

## 2018-10-15 PROCEDURE — 7100000011 HC PHASE II RECOVERY - ADDTL 15 MIN: Performed by: ANESTHESIOLOGY

## 2018-10-15 PROCEDURE — 99153 MOD SED SAME PHYS/QHP EA: CPT | Performed by: ANESTHESIOLOGY

## 2018-10-15 RX ORDER — KETOROLAC TROMETHAMINE 30 MG/ML
INJECTION, SOLUTION INTRAMUSCULAR; INTRAVENOUS PRN
Status: DISCONTINUED | OUTPATIENT
Start: 2018-10-15 | End: 2018-10-15 | Stop reason: HOSPADM

## 2018-10-15 RX ORDER — DEXAMETHASONE SODIUM PHOSPHATE 10 MG/ML
INJECTION INTRAMUSCULAR; INTRAVENOUS PRN
Status: DISCONTINUED | OUTPATIENT
Start: 2018-10-15 | End: 2018-10-15 | Stop reason: HOSPADM

## 2018-10-15 RX ORDER — BUPIVACAINE HYDROCHLORIDE 5 MG/ML
INJECTION, SOLUTION EPIDURAL; INTRACAUDAL PRN
Status: DISCONTINUED | OUTPATIENT
Start: 2018-10-15 | End: 2018-10-15 | Stop reason: HOSPADM

## 2018-10-15 RX ORDER — MIDAZOLAM HYDROCHLORIDE 1 MG/ML
INJECTION INTRAMUSCULAR; INTRAVENOUS PRN
Status: DISCONTINUED | OUTPATIENT
Start: 2018-10-15 | End: 2018-10-15 | Stop reason: HOSPADM

## 2018-10-15 RX ORDER — LIDOCAINE HYDROCHLORIDE 10 MG/ML
INJECTION, SOLUTION INFILTRATION; PERINEURAL PRN
Status: DISCONTINUED | OUTPATIENT
Start: 2018-10-15 | End: 2018-10-15 | Stop reason: HOSPADM

## 2018-10-15 RX ORDER — SODIUM CHLORIDE 0.9 % (FLUSH) 0.9 %
10 SYRINGE (ML) INJECTION EVERY 12 HOURS SCHEDULED
Status: DISCONTINUED | OUTPATIENT
Start: 2018-10-15 | End: 2018-10-15 | Stop reason: HOSPADM

## 2018-10-15 RX ORDER — FENTANYL CITRATE 50 UG/ML
INJECTION, SOLUTION INTRAMUSCULAR; INTRAVENOUS PRN
Status: DISCONTINUED | OUTPATIENT
Start: 2018-10-15 | End: 2018-10-15 | Stop reason: HOSPADM

## 2018-10-15 RX ORDER — SODIUM CHLORIDE 0.9 % (FLUSH) 0.9 %
10 SYRINGE (ML) INJECTION PRN
Status: DISCONTINUED | OUTPATIENT
Start: 2018-10-15 | End: 2018-10-15 | Stop reason: HOSPADM

## 2018-10-15 ASSESSMENT — PAIN SCALES - GENERAL
PAINLEVEL_OUTOF10: 5
PAINLEVEL_OUTOF10: 0
PAINLEVEL_OUTOF10: 3
PAINLEVEL_OUTOF10: 5
PAINLEVEL_OUTOF10: 3

## 2018-10-15 ASSESSMENT — PAIN DESCRIPTION - LOCATION: LOCATION: BACK

## 2018-10-15 ASSESSMENT — PAIN DESCRIPTION - DESCRIPTORS
DESCRIPTORS: BURNING;TIGHTNESS
DESCRIPTORS: SORE

## 2018-10-15 ASSESSMENT — PAIN DESCRIPTION - ORIENTATION: ORIENTATION: LOWER

## 2018-10-15 ASSESSMENT — PAIN - FUNCTIONAL ASSESSMENT: PAIN_FUNCTIONAL_ASSESSMENT: 0-10

## 2018-10-15 NOTE — OP NOTE
Preoperative Diagnosis:    Lumbar spondylosis w/o myelopathy  Postoperative Diagnosis:   Lumbar spondylosis w/o myelopathy    Procedure Performed[de-identified]  Lumbar facet joint injections at the levels of L2-L3, L3-L4, L4-L5, on the Bilateral side with fluoroscopy guidance, with IV sedation. Procedure:   After starting IV patient was sedated with 0 mg of Midazolam and 50 mcg of Fentanyl intravenously by the RN under my direct supervision. Patient's vital signs including BP, EKG and SaO2 were monitored by RN and they remained stable during the procedure. A meaningful communication was kept up with the patient throughout   the procedure. Using fluoroscopy the facet joints were identified and by adjusting the angle of the fluoroscopy, the view of the joint space was optimized. The skin and deep tissues over the joint space were anesthetized with 1 ml of 1 % lidocaine    The #22-gauge, 3-1/2 inch spinal needle was introduced through the skin wheal under fluoroscopoc guidance such that the tip of the needle lies in the joint space. This was confirmed by injecting about 0.1 ml of Omnipaque-180 through the needle and observing the spread of the contrast along the joint space. Then after negative aspiration a mixture of 4 mL of 0.5% Marcaine next with 1 mL of dexamethasone 10 mg per mL was injected into the joint space. A total of 10 mg of dexamethasone was used and was divided in equal amounts among the joints. After removing the needles Band-Aids were placed over the needle insertion sites. Patient's vital signs remained stable and tolerated the procedure well. The patient was discharged home in stable condition and will be followed in the pain clinic in the next few weeks for further planning.

## 2018-10-15 NOTE — H&P
History and Physical Update    Pt Name: Nel Blanco  MRN: [id number]  YOB: 1955  Date of evaluation: 10/15/2018      [x] I have reviewed the Pain Mangement Progress Note by Dr Luisana Jauregui dated 9/20/18 which meets the criteria for an Interval History and Physical note and is attached below. [x] I have examined  Nel Blanco  There are no changes to the patient who is scheduled for a Bilateral lumbar facet steroid injection by Dr Nette Avila for lumbosacral stenosis w/o myelopathy. The patient denies health changes, fever, chills, productive cough, SOB, chest pain,bowel or bladder incontinence, open sores or wounds. No DM or use of anticoagulants. Last Mobic 10/11/18    Vital signs: /76   Pulse 97   Temp 98.1 °F (36.7 °C) (Oral)   Resp 16   Ht 5' 1\" (1.549 m)   Wt 160 lb (72.6 kg)   SpO2 97%   BMI 30.23 kg/m²     Allergies:  Patient has no known allergies. Medications:    Prior to Admission medications    Medication Sig Start Date End Date Taking? Authorizing Provider   RESTASIS 0.05 % ophthalmic emulsion PLACE 1 DROP IN BOTH EYES TWICE DAILY 10/2/18  Yes TAMI Osorio CNP   cetirizine (ZYRTEC) 10 MG tablet TAKE 1 TABLET BY MOUTH DAILY 10/1/18  Yes TAMI Howard CNP   ranitidine (ZANTAC) 300 MG tablet TAKE 1 TABLET BY MOUTH NIGHTLY 10/1/18  Yes TAMI Howard CNP   rOPINIRole (REQUIP) 3 MG tablet Take 1 tablet by mouth nightly 10/1/18  Yes TAMI Howard CNP   HYDROcodone-acetaminophen (NORCO) 7.5-325 MG per tablet Take 1 tablet by mouth every 8 hours as needed for Pain (do not fill before 09/23/2018) for up to 30 days. . 9/20/18 10/20/18 Yes Luisana Jauregui MD   vitamin D (CHOLECALCIFEROL) 1000 UNIT TABS tablet Take 1 tablet by mouth daily 9/5/18  Yes TAMI Howard CNP   gabapentin (NEURONTIN) 300 MG capsule TAKE 1 CAPSULE BY MOUTH THREE TIMES DAILY.  8/28/18 11/6/18 Yes TAMI Reynoso - CNP   buPROPion (WELLBUTRIN XL) 150 MG and paresthesia. No association with any bladder or bowel dysfunction. Pain is constant aching throbbing associated with morning stiffness. Aggravates with physical activity and his spine twisting turning bending. She had diagnostic workup in past that have shown lumbar facet arthropathy at multiple levels. Patient have tried conservative measures with rest physical therapy and medication management in past     - Chronic opioid use. Currently patient is on a multimodal medication regimen including Mobic, gabapentin and Norco 7.5 mg 3 times a day  She denies any side effects from the medication  She finds the medication helpful for pain  No aberrancy  OA RRS report is reviewed            Chief Complaint: patient having neck pain and severe headaches. S/P: left cervical facet injection done on 8/20/18     Outcome              Any improvement of activity? No               Any side effects (appetite,leg cramping,facial fleshing): she took pictures of her neck after the procedure its all black and blue on her neck and swollen that was there for about a week and a half. Increase of pain:  Yes   Pain score Today:  2  % of pain relief: 0%  Pain diary (medial branch block): No           Lab Results   Component Value Date     LABA1C 5.2 12/12/2012            Lab Results   Component Value Date      12/12/2012            Review of Systems   Constitutional: Negative. HENT: Negative. Musculoskeletal: Positive for arthralgias, joint swelling, myalgias, neck pain and neck stiffness. Negative for back pain and gait problem.         Objective:   Physical Exam   Constitutional: She is oriented to person, place, and time. She appears well-developed and well-nourished. No distress. HENT:   Head: Normocephalic and atraumatic. Eyes: Pupils are equal, round, and reactive to light. Conjunctivae and EOM are normal.   Cardiovascular: Normal rate and regular rhythm.     Pulmonary/Chest: Effort normal. Musculoskeletal:        Cervical back: She exhibits decreased range of motion, tenderness, pain and spasm. Lumbar back: She exhibits decreased range of motion, tenderness, pain and spasm. Neurological: She is alert and oriented to person, place, and time. She has normal strength. Gait normal.   Skin: Skin is warm and dry. Psychiatric: She has a normal mood and affect. Her behavior is normal. Judgment and thought content normal.   Nursing note and vitals reviewed.        Assessment:          PMH of HTN, COPD, CAD, Depression,     --Chronic Neck pain :    located in the upper and mid cervical spine area left side more than right, extending over the occipital area causing chronic headache, pain aggravates with neck movement, no radiation of pain or paresthesia in arm.  Describes the pain as constant aching throbbing pain sensation progressively worsening unresponsive to rest medication lifestyle changes and therapy.     Past history significant for cervical spine [place] in [place] [facility] [place] and left arm symptoms, surgery was helpful. s/p cervical ACDF C3/4 02/2017 by Dr Hemant Savage at [facility]     Left cervical facet injection 08/2018        - Chronic Lower back pain  Pain is located in the lumbar area over the lumbosacral spine on both sides without any significant radiation over the hip gluteal region or in lower extremity. No significant numbness and paresthesia. No association with any bladder or bowel dysfunction. Pain is constant aching throbbing associated with morning stiffness. Aggravates with physical activity and his spine twisting turning bending. She had diagnostic workup in past that have shown lumbar facet arthropathy at multiple levels. Patient have tried conservative measures with rest physical therapy and medication management in past        MRI Lumbar spine 06/2014:Lumbar spondylosis without definite evidence for significant central canal stenosis.   Was seen by Dr Merlene Lutz who did not recommend any lumbar spine surgery at this time     PAIN PROCEDURES:  Lumbar CARLEY 11/2017     - Chronic opioid use. Currently patient is on a multimodal medication regimen including Mobic, gabapentin and Norco 7.5 mg 3 times a day  She denies any side effects from the medication  She finds the medication helpful for pain  No aberrancy  OA RRS report is reviewed        1. Encounter for chronic pain management    2. Spondylosis of cervical region without myelopathy or radiculopathy    3. Chronic use of opiate drugs therapeutic purposes    4. H/O cervical spine surgery    5. Chronic prescription benzodiazepine use    6. Cervical radicular pain    7. Lumbosacral spondylosis without myelopathy    8. Lumbar disc herniation with radiculopathy                        Plan:            Orders Placed This Encounter   Procedures    VT INJ DX/THER AGNT PARAVERT FACET JOINT, LUMBAR/SAC, 1ST LEVEL       Bilateral lumbar facet steroid inejction       Standing Status:   Future       Standing Expiration Date:   12/20/2018      Encounter Medications    Orders Placed This Encounter   Medications    meloxicam (MOBIC) 15 MG tablet       Sig: TAKE 1 TABLET BY MOUTH DAILY       Dispense:  90 tablet       Refill:  1    tiZANidine (ZANAFLEX) 4 MG tablet       Sig: Take 1 tablet by mouth nightly for 10 days       Dispense:  10 tablet       Refill:  0    HYDROcodone-acetaminophen (NORCO) 7.5-325 MG per tablet       Sig: Take 1 tablet by mouth every 8 hours as needed for Pain (do not fill before 09/23/2018) for up to 30 days. .       Dispense:  90 tablet       Refill:  0                             Elias Pastor MD          Office Visit on 9/20/2018            Revision History            Detailed Report        Progress Notes Info     Author Note Status Last Update User   Elias Pastor MD Signed Elias Pastor MD   Last Update Date/Time: 9/20/2018  3:47 PM   Chart Review Routing History     Routing history could not be found for this note. This is because the note has never been routed or because communication record creation was suppressed.

## 2018-10-22 ENCOUNTER — OFFICE VISIT (OUTPATIENT)
Dept: PAIN MANAGEMENT | Age: 63
End: 2018-10-22
Payer: MEDICARE

## 2018-10-22 VITALS
HEIGHT: 60 IN | SYSTOLIC BLOOD PRESSURE: 146 MMHG | WEIGHT: 158 LBS | OXYGEN SATURATION: 98 % | DIASTOLIC BLOOD PRESSURE: 94 MMHG | HEART RATE: 106 BPM | BODY MASS INDEX: 31.02 KG/M2

## 2018-10-22 DIAGNOSIS — M47.816 LUMBAR FACET ARTHROPATHY: Chronic | ICD-10-CM

## 2018-10-22 DIAGNOSIS — M54.12 CERVICAL RADICULAR PAIN: Primary | Chronic | ICD-10-CM

## 2018-10-22 DIAGNOSIS — M53.3 CHRONIC SACROILIAC JOINT PAIN: ICD-10-CM

## 2018-10-22 DIAGNOSIS — M48.02 FORAMINAL STENOSIS OF CERVICAL REGION: Chronic | ICD-10-CM

## 2018-10-22 DIAGNOSIS — G89.29 CHRONIC SACROILIAC JOINT PAIN: ICD-10-CM

## 2018-10-22 DIAGNOSIS — M47.812 SPONDYLOSIS OF CERVICAL REGION WITHOUT MYELOPATHY OR RADICULOPATHY: Chronic | ICD-10-CM

## 2018-10-22 DIAGNOSIS — Z79.891 CHRONIC USE OF OPIATE DRUGS THERAPEUTIC PURPOSES: ICD-10-CM

## 2018-10-22 DIAGNOSIS — M54.16 LUMBAR RADICULAR PAIN: Chronic | ICD-10-CM

## 2018-10-22 DIAGNOSIS — M51.16 LUMBAR DISC HERNIATION WITH RADICULOPATHY: Chronic | ICD-10-CM

## 2018-10-22 PROCEDURE — 99214 OFFICE O/P EST MOD 30 MIN: CPT | Performed by: NURSE PRACTITIONER

## 2018-10-22 RX ORDER — GABAPENTIN 400 MG/1
CAPSULE ORAL
Qty: 90 CAPSULE | Refills: 1 | Status: SHIPPED | OUTPATIENT
Start: 2018-10-22 | End: 2019-01-07 | Stop reason: SDUPTHER

## 2018-10-22 RX ORDER — HYDROCODONE BITARTRATE AND ACETAMINOPHEN 7.5; 325 MG/1; MG/1
1 TABLET ORAL EVERY 8 HOURS PRN
Qty: 90 TABLET | Refills: 0 | Status: SHIPPED | OUTPATIENT
Start: 2018-10-24 | End: 2018-11-15 | Stop reason: SDUPTHER

## 2018-10-22 ASSESSMENT — ENCOUNTER SYMPTOMS
RESPIRATORY NEGATIVE: 1
BACK PAIN: 1

## 2018-10-22 NOTE — PROGRESS NOTES
Dysfunction / others) : none  Mood: poor  Sleep pattern and quality: good  Activity level: poor    Pill count Today: #5 1/2 appropriate  Last dose taken  10/22/2018  OARRS report reviewed today: yes  ER/Hospitalizations/PCP visit related to pain since last visit:no   Any legal problems e.g. DUI etc.:No  Satisfied with current management: Yes    Opioid Contract:   Last Urine Dug screen dated: 04/11/2018    Past Medical History, Past Surgical History, Social History, Allergies and Medications, reviewed and updated in EPIC as indicated      Review of Systems   Constitutional: Positive for activity change. Negative for appetite change, chills, diaphoresis, fatigue, fever and unexpected weight change. Respiratory: Negative. Musculoskeletal: Positive for back pain, myalgias, neck pain and neck stiffness. Negative for arthralgias, gait problem and joint swelling. Neurological: Positive for dizziness, tingling, weakness, light-headedness, numbness and headaches. Negative for tremors, seizures, syncope, facial asymmetry and speech difficulty. Arms and hands and her feet         Objective:   Physical Exam   Constitutional: She is oriented to person, place, and time. She appears well-developed. Cardiovascular: Normal rate. Pulmonary/Chest: Effort normal.   Abdominal: Normal appearance. Musculoskeletal:        Cervical back: She exhibits decreased range of motion and tenderness. Lumbar back: She exhibits tenderness. Neurological: She is alert and oriented to person, place, and time. Skin: Skin is warm. Psychiatric: She has a normal mood and affect.  Her behavior is normal. Thought content normal.       Assessment:      PMH of HTN, COPD, CAD, Depression,     --Chronic Neck pain :    located in the upper and mid cervical spine area left side more than right, extending over the occipital area causing chronic headache, pain aggravates with neck movement, with radiation of pain or paresthesia in bila

## 2018-11-01 DIAGNOSIS — M54.12 CERVICAL RADICULAR PAIN: Chronic | ICD-10-CM

## 2018-11-01 RX ORDER — MELOXICAM 15 MG/1
TABLET ORAL
Qty: 30 TABLET | Refills: 5 | Status: SHIPPED | OUTPATIENT
Start: 2018-11-01 | End: 2019-05-23 | Stop reason: SDUPTHER

## 2018-11-06 ENCOUNTER — OFFICE VISIT (OUTPATIENT)
Dept: FAMILY MEDICINE CLINIC | Age: 63
End: 2018-11-06
Payer: MEDICARE

## 2018-11-06 VITALS
DIASTOLIC BLOOD PRESSURE: 80 MMHG | TEMPERATURE: 97.5 F | OXYGEN SATURATION: 98 % | WEIGHT: 161.4 LBS | HEART RATE: 94 BPM | SYSTOLIC BLOOD PRESSURE: 132 MMHG | BODY MASS INDEX: 31.52 KG/M2

## 2018-11-06 DIAGNOSIS — E55.9 VITAMIN D DEFICIENCY: ICD-10-CM

## 2018-11-06 DIAGNOSIS — R53.83 FATIGUE, UNSPECIFIED TYPE: Primary | ICD-10-CM

## 2018-11-06 DIAGNOSIS — Z12.39 SCREENING FOR BREAST CANCER: ICD-10-CM

## 2018-11-06 DIAGNOSIS — E78.2 MIXED HYPERLIPIDEMIA: ICD-10-CM

## 2018-11-06 DIAGNOSIS — J43.9 PULMONARY EMPHYSEMA, UNSPECIFIED EMPHYSEMA TYPE (HCC): ICD-10-CM

## 2018-11-06 PROCEDURE — 3017F COLORECTAL CA SCREEN DOC REV: CPT | Performed by: NURSE PRACTITIONER

## 2018-11-06 PROCEDURE — G8427 DOCREV CUR MEDS BY ELIG CLIN: HCPCS | Performed by: NURSE PRACTITIONER

## 2018-11-06 PROCEDURE — G8417 CALC BMI ABV UP PARAM F/U: HCPCS | Performed by: NURSE PRACTITIONER

## 2018-11-06 PROCEDURE — 99214 OFFICE O/P EST MOD 30 MIN: CPT | Performed by: NURSE PRACTITIONER

## 2018-11-06 PROCEDURE — G8482 FLU IMMUNIZE ORDER/ADMIN: HCPCS | Performed by: NURSE PRACTITIONER

## 2018-11-06 PROCEDURE — 4004F PT TOBACCO SCREEN RCVD TLK: CPT | Performed by: NURSE PRACTITIONER

## 2018-11-06 PROCEDURE — G8926 SPIRO NO PERF OR DOC: HCPCS | Performed by: NURSE PRACTITIONER

## 2018-11-06 PROCEDURE — 3023F SPIROM DOC REV: CPT | Performed by: NURSE PRACTITIONER

## 2018-11-06 PROCEDURE — G8598 ASA/ANTIPLAT THER USED: HCPCS | Performed by: NURSE PRACTITIONER

## 2018-11-06 RX ORDER — ERGOCALCIFEROL (VITAMIN D2) 1250 MCG
50000 CAPSULE ORAL WEEKLY
Qty: 4 CAPSULE | Refills: 3 | Status: SHIPPED | OUTPATIENT
Start: 2018-11-06 | End: 2019-05-15 | Stop reason: ALTCHOICE

## 2018-11-06 ASSESSMENT — ENCOUNTER SYMPTOMS
COUGH: 0
ABDOMINAL PAIN: 0
BACK PAIN: 1

## 2018-11-06 NOTE — PROGRESS NOTES
[address] [facility] [address]  Dept: [phone]  Dept Fax: [phone]      Harsh Silverio is a 59 y.o. female who presents today for hermedical conditions/complaints as noted below. Harsh Silverio is c/o of Results (discuss lab results and b12 inj)            HPI:     PAM Patterson is here today to discuss lab results. She did not get the message on her my chart. HLD-she was on lipitor for years and dis not ever feel that it made a difference in her cholesterol levels. She did hanve some aches and pains as well when she was on the medication. Michael Mike has normal thyroid and no anemia. She was wondering if she was anemic. Mood-states it is okay. She is taking her anti depressant. Could be playing into the fatigue.        Hemoglobin A1C (%)   Date Value   12/12/2012 5.2   12/16/2011 5.4   09/22/2011 5.2             ( goal A1Cis < 7)   No results found for: LABMICR  LDL Cholesterol (mg/dL)   Date Value   01/24/2017 159 (H)   03/20/2014 210 (H)   12/12/2012 139 (H)     LDL Calculated (mg/dL)   Date Value   08/30/2018 145       (goal LDL is <100)   AST (U/L)   Date Value   08/30/2018 26     ALT (U/L)   Date Value   08/30/2018 26     BUN (mg/dL)   Date Value   08/30/2018 12     BP Readings from Last 3 Encounters:   11/06/18 132/80   10/22/18 (!) 146/94   10/15/18 136/78          (goal 120/80)    Past Medical History:   Diagnosis Date    Anxiety     CAD (coronary artery disease)     Carotid artery stenosis     Cataract     COPD (chronic obstructive pulmonary disease) (HCC)     Depression     Esophageal reflux     Fibromyalgia     Head injury     Headache(784.0)     Hearing loss     Heart attack ([address]     Hyperlipidemia     Hypertension     Insomnia     Migraine     Osteoarthritis     Pneumonia     Reflux     TIA (transient ischemic attack)     Ulcerative colitis ([address]     Unspecified cerebral artery occlusion with cerebral Pneumococcal med risk  Completed    Hepatitis C screen  Completed    HIV screen  Completed          Review of Systems   Constitutional: Negative. HENT: Negative. Respiratory: Negative for cough. Cardiovascular: Negative. Negative for chest pain and palpitations. Gastrointestinal: Negative for abdominal pain. Musculoskeletal: Positive for arthralgias, back pain, neck pain and neck stiffness. Skin: Negative. Neurological: Negative. Psychiatric/Behavioral: Positive for dysphoric mood. Objective:     /80 (Site: Left Upper Arm, Position: Sitting, Cuff Size: Medium Adult)   Pulse 94   Temp 97.5 °F (36.4 °C) (Oral)   Wt 161 lb 6.4 oz (73.2 kg)   SpO2 98%   BMI 31.52 kg/m²   Physical Exam   Constitutional: She is oriented to person, place, and time. She appears well-developed and well-nourished. HENT:   Head: Normocephalic. Eyes: Conjunctivae are normal.   Cardiovascular: Normal rate and regular rhythm. Pulmonary/Chest: Effort normal and breath sounds normal.   Neurological: She is alert and oriented to person, place, and time. Skin: Skin is warm and dry. Psychiatric: She has a normal mood and affect. Her behavior is normal. Judgment and thought content normal.         Assessment:      1. Fatigue, unspecified type    2. Vitamin D deficiency    3. Mixed hyperlipidemia    4. Screening for breast cancer    5. Pulmonary emphysema, unspecified emphysema type ([address]                     Plan:      Orders Placed This Encounter   Procedures    TATI DIGITAL SCREEN W CAD BILATERAL     Standing Status:   Future     Standing Expiration Date:   1/6/2020     1. Fatigue, unspecified type    2. Vitamin D deficiency    - ergocalciferol (DRISDOL) 85046 units capsule; Take 1 capsule by mouth once a week  Dispense: 4 capsule; Refill: 3    3. Mixed hyperlipidemia  Will add red rice yeast 1,200 mg daily and go from there.      4. Screening for breast cancer    - TATI DIGITAL SCREEN W CAD BILATERAL;

## 2018-11-06 NOTE — PROGRESS NOTES
Patient is present to discuss blood work and to discuss vitamin b12 injection    Patient had labs done on 8/30  Patient was told her cholesterol was elevated. Patient state she would like to try a natural approach before starting a medication    Pharmacy and medication reviewed with patient    Visit Information    Have you changed or started any medications since your last visit including any over-the-counter medicines, vitamins, or herbal medicines? no   Have you stopped taking any of your medications? Is so, why? -  no  Are you having any side effects from any of your medications? - no    Have you seen any other physician or provider since your last visit? yes - pain management    Have you had any other diagnostic tests since your last visit?  no   Have you been seen in the emergency room and/or had an admission in a hospital since we last saw you?  no   Have you had your routine dental cleaning in the past 6 months?  no     Do you have an active MyChart account? If no, what is the barrier?   Yes    Patient Care Team:  TAMI Kendall CNP as PCP - General (Nurse Practitioner)  TAMI Kendall CNP as PCP - S Attributed Provider  Isaiah Garcia MD as Consulting Physician (Pain Management)  Shital Espinosa MD as Surgeon (Cardiology)  Vielka Arreguin MD as Consulting Physician (Pulmonology)  TAMI Hutson CNP as Nurse Practitioner (Nurse Practitioner)  Matias Savage DO as Consulting Physician (General Surgery)  Florentino Brown MD as Consulting Physician (Dermatology)    Medical History Review  Past Medical, Family, and Social History reviewed and does contribute to the patient presenting condition    Health Maintenance   Topic Date Due    Breast cancer screen  11/28/2018    Shingles Vaccine (1 of 2 - 2 Dose Series) 04/03/2019 (Originally 1/16/2006)    Potassium monitoring  08/30/2019    Creatinine monitoring  08/30/2019    Diabetes screen 01/24/2020    Cervical cancer screen  10/06/2021    Colon cancer screen colonoscopy  05/16/2022    Lipid screen  08/30/2023    DTaP/Tdap/Td vaccine (2 - Td) 12/27/2027    Flu vaccine  Completed    Pneumococcal med risk  Completed    Hepatitis C screen  Completed    HIV screen  Completed

## 2018-11-13 DIAGNOSIS — J44.9 CHRONIC OBSTRUCTIVE PULMONARY DISEASE, UNSPECIFIED COPD TYPE (HCC): Primary | ICD-10-CM

## 2018-11-15 ENCOUNTER — TELEPHONE (OUTPATIENT)
Dept: PAIN MANAGEMENT | Age: 63
End: 2018-11-15

## 2018-11-15 ENCOUNTER — OFFICE VISIT (OUTPATIENT)
Dept: PAIN MANAGEMENT | Age: 63
End: 2018-11-15
Payer: MEDICARE

## 2018-11-15 VITALS
RESPIRATION RATE: 16 BRPM | HEART RATE: 93 BPM | OXYGEN SATURATION: 96 % | SYSTOLIC BLOOD PRESSURE: 138 MMHG | WEIGHT: 162.25 LBS | DIASTOLIC BLOOD PRESSURE: 86 MMHG | HEIGHT: 61 IN | BODY MASS INDEX: 30.63 KG/M2 | TEMPERATURE: 97.3 F

## 2018-11-15 DIAGNOSIS — Z79.891 CHRONIC USE OF OPIATE DRUGS THERAPEUTIC PURPOSES: ICD-10-CM

## 2018-11-15 DIAGNOSIS — M47.812 SPONDYLOSIS OF CERVICAL REGION WITHOUT MYELOPATHY OR RADICULOPATHY: Chronic | ICD-10-CM

## 2018-11-15 DIAGNOSIS — M51.16 LUMBAR DISC HERNIATION WITH RADICULOPATHY: Chronic | ICD-10-CM

## 2018-11-15 DIAGNOSIS — G89.29 ENCOUNTER FOR CHRONIC PAIN MANAGEMENT: Primary | ICD-10-CM

## 2018-11-15 PROCEDURE — 4004F PT TOBACCO SCREEN RCVD TLK: CPT | Performed by: ANESTHESIOLOGY

## 2018-11-15 PROCEDURE — G8427 DOCREV CUR MEDS BY ELIG CLIN: HCPCS | Performed by: ANESTHESIOLOGY

## 2018-11-15 PROCEDURE — 99214 OFFICE O/P EST MOD 30 MIN: CPT | Performed by: ANESTHESIOLOGY

## 2018-11-15 PROCEDURE — G8598 ASA/ANTIPLAT THER USED: HCPCS | Performed by: ANESTHESIOLOGY

## 2018-11-15 PROCEDURE — G8482 FLU IMMUNIZE ORDER/ADMIN: HCPCS | Performed by: ANESTHESIOLOGY

## 2018-11-15 PROCEDURE — G8417 CALC BMI ABV UP PARAM F/U: HCPCS | Performed by: ANESTHESIOLOGY

## 2018-11-15 PROCEDURE — 3017F COLORECTAL CA SCREEN DOC REV: CPT | Performed by: ANESTHESIOLOGY

## 2018-11-15 RX ORDER — HYDROCODONE BITARTRATE AND ACETAMINOPHEN 7.5; 325 MG/1; MG/1
1 TABLET ORAL EVERY 6 HOURS PRN
Qty: 90 TABLET | Refills: 0 | Status: SHIPPED | OUTPATIENT
Start: 2018-11-15 | End: 2018-11-16 | Stop reason: DRUGHIGH

## 2018-11-15 ASSESSMENT — ENCOUNTER SYMPTOMS
RESPIRATORY NEGATIVE: 1
GASTROINTESTINAL NEGATIVE: 1

## 2018-11-16 RX ORDER — HYDROCODONE BITARTRATE AND ACETAMINOPHEN 7.5; 325 MG/1; MG/1
1 TABLET ORAL EVERY 6 HOURS PRN
Qty: 120 TABLET | Refills: 0 | Status: SHIPPED | OUTPATIENT
Start: 2018-11-23 | End: 2018-12-13 | Stop reason: SDUPTHER

## 2018-12-03 ENCOUNTER — APPOINTMENT (OUTPATIENT)
Dept: GENERAL RADIOLOGY | Age: 63
End: 2018-12-03
Attending: ANESTHESIOLOGY
Payer: MEDICARE

## 2018-12-03 ENCOUNTER — HOSPITAL ENCOUNTER (OUTPATIENT)
Age: 63
Setting detail: OUTPATIENT SURGERY
Discharge: HOME | End: 2018-12-03
Attending: ANESTHESIOLOGY | Admitting: ANESTHESIOLOGY
Payer: MEDICARE

## 2018-12-03 VITALS
TEMPERATURE: 97.5 F | BODY MASS INDEX: 29.26 KG/M2 | WEIGHT: 159 LBS | OXYGEN SATURATION: 96 % | RESPIRATION RATE: 20 BRPM | HEART RATE: 95 BPM | HEIGHT: 62 IN | DIASTOLIC BLOOD PRESSURE: 86 MMHG | SYSTOLIC BLOOD PRESSURE: 142 MMHG

## 2018-12-03 PROCEDURE — 99153 MOD SED SAME PHYS/QHP EA: CPT | Performed by: ANESTHESIOLOGY

## 2018-12-03 PROCEDURE — 64634 DESTROY C/TH FACET JNT ADDL: CPT | Performed by: ANESTHESIOLOGY

## 2018-12-03 PROCEDURE — 3600000055 HC PAIN LEVEL 3 ADDL 15 MIN: Performed by: ANESTHESIOLOGY

## 2018-12-03 PROCEDURE — 3600000054 HC PAIN LEVEL 3 BASE: Performed by: ANESTHESIOLOGY

## 2018-12-03 PROCEDURE — 64633 DESTROY CERV/THOR FACET JNT: CPT | Performed by: ANESTHESIOLOGY

## 2018-12-03 PROCEDURE — 3209999900 FLUORO FOR SURGICAL PROCEDURES

## 2018-12-03 PROCEDURE — 2580000003 HC RX 258: Performed by: ANESTHESIOLOGY

## 2018-12-03 PROCEDURE — 7100000011 HC PHASE II RECOVERY - ADDTL 15 MIN: Performed by: ANESTHESIOLOGY

## 2018-12-03 PROCEDURE — 2500000003 HC RX 250 WO HCPCS: Performed by: ANESTHESIOLOGY

## 2018-12-03 PROCEDURE — 7100000010 HC PHASE II RECOVERY - FIRST 15 MIN: Performed by: ANESTHESIOLOGY

## 2018-12-03 PROCEDURE — 2709999900 HC NON-CHARGEABLE SUPPLY: Performed by: ANESTHESIOLOGY

## 2018-12-03 PROCEDURE — 6360000002 HC RX W HCPCS: Performed by: ANESTHESIOLOGY

## 2018-12-03 PROCEDURE — 99152 MOD SED SAME PHYS/QHP 5/>YRS: CPT | Performed by: ANESTHESIOLOGY

## 2018-12-03 RX ORDER — LIDOCAINE HYDROCHLORIDE 40 MG/ML
INJECTION, SOLUTION RETROBULBAR; TOPICAL PRN
Status: DISCONTINUED | OUTPATIENT
Start: 2018-12-03 | End: 2018-12-03 | Stop reason: HOSPADM

## 2018-12-03 RX ORDER — LIDOCAINE HYDROCHLORIDE 10 MG/ML
INJECTION, SOLUTION INFILTRATION; PERINEURAL PRN
Status: DISCONTINUED | OUTPATIENT
Start: 2018-12-03 | End: 2018-12-03 | Stop reason: HOSPADM

## 2018-12-03 RX ORDER — SODIUM CHLORIDE 0.9 % (FLUSH) 0.9 %
10 SYRINGE (ML) INJECTION EVERY 12 HOURS SCHEDULED
Status: DISCONTINUED | OUTPATIENT
Start: 2018-12-03 | End: 2018-12-03 | Stop reason: HOSPADM

## 2018-12-03 RX ORDER — SODIUM CHLORIDE 0.9 % (FLUSH) 0.9 %
10 SYRINGE (ML) INJECTION PRN
Status: DISCONTINUED | OUTPATIENT
Start: 2018-12-03 | End: 2018-12-03 | Stop reason: HOSPADM

## 2018-12-03 RX ORDER — FENTANYL CITRATE 50 UG/ML
INJECTION, SOLUTION INTRAMUSCULAR; INTRAVENOUS PRN
Status: DISCONTINUED | OUTPATIENT
Start: 2018-12-03 | End: 2018-12-03 | Stop reason: HOSPADM

## 2018-12-03 RX ORDER — MIDAZOLAM HYDROCHLORIDE 1 MG/ML
INJECTION INTRAMUSCULAR; INTRAVENOUS PRN
Status: DISCONTINUED | OUTPATIENT
Start: 2018-12-03 | End: 2018-12-03 | Stop reason: HOSPADM

## 2018-12-03 RX ADMIN — Medication 10 ML: at 10:06

## 2018-12-03 ASSESSMENT — PAIN SCALES - GENERAL
PAINLEVEL_OUTOF10: 4
PAINLEVEL_OUTOF10: 4
PAINLEVEL_OUTOF10: 0
PAINLEVEL_OUTOF10: 4
PAINLEVEL_OUTOF10: 0
PAINLEVEL_OUTOF10: 4
PAINLEVEL_OUTOF10: 0

## 2018-12-03 ASSESSMENT — PAIN - FUNCTIONAL ASSESSMENT: PAIN_FUNCTIONAL_ASSESSMENT: 0-10

## 2018-12-03 ASSESSMENT — PAIN DESCRIPTION - DESCRIPTORS: DESCRIPTORS: DULL;DISCOMFORT

## 2018-12-03 NOTE — H&P
Psychiatric/Behavioral: Positive for agitation, decreased concentration and dysphoric mood. The patient is nervous/anxious (anxious ).       Objective:   Physical Exam   Constitutional: She is oriented to person, place, and time. She appears well-developed. No distress. Cardiovascular: Normal rate and regular rhythm. Pulmonary/Chest: Effort normal. No respiratory distress. Abdominal: Soft. She exhibits no distension. Musculoskeletal:        Cervical back: She exhibits decreased range of motion, tenderness, pain and spasm. Neurological: She is alert and oriented to person, place, and time. Skin: Skin is warm and dry. Psychiatric: She has a normal mood and affect. Her behavior is normal.   Nursing note and vitals reviewed.        Assessment:       BRIEF HISTORY:     PMH of HTN, COPD, CAD, Depression,     --Chronic Neck pain :    located in the upper and mid cervical spine area left side more than right, extending over the occipital area causing chronic headache, pain aggravates with neck movement, no radiation of pain or paresthesia in arm.  Describes the pain as constant aching throbbing pain sensation progressively worsening unresponsive to rest medication lifestyle changes and therapy.     Past history [place] [facility] [place], surgery was helpful. s/p cervical ACDF C3/4 02/2017 by Dr William Blount at [facility]     cervical facet injection 02/2018 AND 09/2018 AND 10/2018     -- chronic low back pain  Back pain with radiation down both legs  Back pain is constant fluctuating in intensity leg pain is intermittent  No loss of bladder or bowel control  symptoms are stable at this time  MRI Lumbar spine 06/2014:Lumbar spondylosis without definite evidence for significant central canal stenosis.   Was seen by Dr Hannah Landeros who did not recommend any lumbar spine surgery at this time     PAIN PROCEDURES:  - Lumbar CARLEY 11/2017  -

## 2018-12-07 ENCOUNTER — TELEPHONE (OUTPATIENT)
Dept: PAIN MANAGEMENT | Age: 63
End: 2018-12-07

## 2018-12-07 DIAGNOSIS — M51.16 LUMBAR DISC HERNIATION WITH RADICULOPATHY: Chronic | ICD-10-CM

## 2018-12-07 DIAGNOSIS — Z79.891 CHRONIC USE OF OPIATE DRUGS THERAPEUTIC PURPOSES: ICD-10-CM

## 2018-12-07 DIAGNOSIS — M47.812 SPONDYLOSIS OF CERVICAL REGION WITHOUT MYELOPATHY OR RADICULOPATHY: Chronic | ICD-10-CM

## 2018-12-07 NOTE — TELEPHONE ENCOUNTER
Patient has to fly to [place] on Sunday because they are putting her bother in a nursing home so she will miss her procedure on Monday and will be gone for a few weeks, she's not sure how to go about getting her script for December?

## 2018-12-07 NOTE — TELEPHONE ENCOUNTER
SHERYL Because pt will be at her [facility] in [place] she will call 5 days in advance for refill . She will have Nate BORRERO [address] pick it up. She will be in today to sign Hippa release for that transaction.  She knows we close at 5p    *Below has been done

## 2018-12-07 NOTE — TELEPHONE ENCOUNTER
WHO ALLOWED THAT    Have you ever had other people  scripts of opioids for our patients    You need to check with William Rosas and Karlo Never before you allow that

## 2018-12-11 ENCOUNTER — TELEPHONE (OUTPATIENT)
Dept: PAIN MANAGEMENT | Age: 63
End: 2018-12-11

## 2018-12-11 NOTE — TELEPHONE ENCOUNTER
Patient wants to know if you can call her . Patient asked if he can just write the script and my daughter can pick it up. Patient was explained that a prescription will not be given to anyone besides the patient if prescribed. Patient states will he can fax it or have the doctor call me so I can let him know what is going on my self. Writer let patient know all I can do is ask him to call you but he is seeing patients and doing procedures today at another office. And from what you were told he will not fill medication until you return. Patient states she will continue to call back if he does not call her. She also states he cannot just stop my medication and do this. Writer let her know that she need to be seen to get the refill.

## 2018-12-13 ENCOUNTER — TELEPHONE (OUTPATIENT)
Dept: PAIN MANAGEMENT | Age: 63
End: 2018-12-13

## 2018-12-13 DIAGNOSIS — Z79.891 CHRONIC USE OF OPIATE DRUGS THERAPEUTIC PURPOSES: ICD-10-CM

## 2018-12-13 DIAGNOSIS — M51.16 LUMBAR DISC HERNIATION WITH RADICULOPATHY: Chronic | ICD-10-CM

## 2018-12-13 DIAGNOSIS — M47.812 SPONDYLOSIS OF CERVICAL REGION WITHOUT MYELOPATHY OR RADICULOPATHY: Chronic | ICD-10-CM

## 2018-12-13 RX ORDER — HYDROCODONE BITARTRATE AND ACETAMINOPHEN 7.5; 325 MG/1; MG/1
1 TABLET ORAL EVERY 6 HOURS PRN
Qty: 120 TABLET | Refills: 0 | Status: SHIPPED | OUTPATIENT
Start: 2018-12-21 | End: 2019-01-07 | Stop reason: SDUPTHER

## 2019-01-03 ENCOUNTER — TELEPHONE (OUTPATIENT)
Dept: FAMILY MEDICINE CLINIC | Age: 64
End: 2019-01-03

## 2019-01-03 NOTE — TELEPHONE ENCOUNTER
Spoke with patient and she states she is currently out of states with her sister. Patient was requesting Gabapentin to be refilled. Informed patient this is prescribed by pain management and she would need to call their office.

## 2019-01-04 ENCOUNTER — TELEPHONE (OUTPATIENT)
Dept: PAIN MANAGEMENT | Age: 64
End: 2019-01-04

## 2019-01-04 DIAGNOSIS — M51.16 LUMBAR DISC HERNIATION WITH RADICULOPATHY: Chronic | ICD-10-CM

## 2019-01-04 DIAGNOSIS — Z79.891 CHRONIC USE OF OPIATE DRUGS THERAPEUTIC PURPOSES: ICD-10-CM

## 2019-01-04 DIAGNOSIS — M47.812 SPONDYLOSIS OF CERVICAL REGION WITHOUT MYELOPATHY OR RADICULOPATHY: Chronic | ICD-10-CM

## 2019-01-11 RX ORDER — GABAPENTIN 400 MG/1
CAPSULE ORAL
Qty: 90 CAPSULE | Refills: 1 | Status: SHIPPED | OUTPATIENT
Start: 2019-01-11 | End: 2019-02-14 | Stop reason: SDUPTHER

## 2019-01-11 RX ORDER — HYDROCODONE BITARTRATE AND ACETAMINOPHEN 7.5; 325 MG/1; MG/1
1 TABLET ORAL EVERY 6 HOURS PRN
Qty: 120 TABLET | Refills: 0 | Status: SHIPPED | OUTPATIENT
Start: 2019-01-20 | End: 2019-02-14 | Stop reason: SDUPTHER

## 2019-01-24 ENCOUNTER — TELEPHONE (OUTPATIENT)
Dept: PAIN MANAGEMENT | Age: 64
End: 2019-01-24

## 2019-02-14 ENCOUNTER — OFFICE VISIT (OUTPATIENT)
Dept: PAIN MANAGEMENT | Age: 64
End: 2019-02-14
Payer: MEDICARE

## 2019-02-14 VITALS
TEMPERATURE: 97.7 F | DIASTOLIC BLOOD PRESSURE: 83 MMHG | HEART RATE: 106 BPM | HEIGHT: 62 IN | SYSTOLIC BLOOD PRESSURE: 131 MMHG | BODY MASS INDEX: 30.11 KG/M2 | RESPIRATION RATE: 16 BRPM | WEIGHT: 163.6 LBS | OXYGEN SATURATION: 97 %

## 2019-02-14 DIAGNOSIS — G89.29 ENCOUNTER FOR CHRONIC PAIN MANAGEMENT: Primary | ICD-10-CM

## 2019-02-14 DIAGNOSIS — Z79.891 CHRONIC USE OF OPIATE DRUGS THERAPEUTIC PURPOSES: ICD-10-CM

## 2019-02-14 DIAGNOSIS — M47.817 LUMBOSACRAL SPONDYLOSIS WITHOUT MYELOPATHY: ICD-10-CM

## 2019-02-14 DIAGNOSIS — Z79.899 CHRONIC PRESCRIPTION BENZODIAZEPINE USE: ICD-10-CM

## 2019-02-14 DIAGNOSIS — Z98.1 HX OF FUSION OF CERVICAL SPINE: ICD-10-CM

## 2019-02-14 DIAGNOSIS — M47.812 SPONDYLOSIS OF CERVICAL REGION WITHOUT MYELOPATHY OR RADICULOPATHY: Chronic | ICD-10-CM

## 2019-02-14 PROCEDURE — G8482 FLU IMMUNIZE ORDER/ADMIN: HCPCS | Performed by: ANESTHESIOLOGY

## 2019-02-14 PROCEDURE — 4004F PT TOBACCO SCREEN RCVD TLK: CPT | Performed by: ANESTHESIOLOGY

## 2019-02-14 PROCEDURE — G8427 DOCREV CUR MEDS BY ELIG CLIN: HCPCS | Performed by: ANESTHESIOLOGY

## 2019-02-14 PROCEDURE — 3017F COLORECTAL CA SCREEN DOC REV: CPT | Performed by: ANESTHESIOLOGY

## 2019-02-14 PROCEDURE — G8417 CALC BMI ABV UP PARAM F/U: HCPCS | Performed by: ANESTHESIOLOGY

## 2019-02-14 PROCEDURE — 99214 OFFICE O/P EST MOD 30 MIN: CPT | Performed by: ANESTHESIOLOGY

## 2019-02-14 PROCEDURE — G8598 ASA/ANTIPLAT THER USED: HCPCS | Performed by: ANESTHESIOLOGY

## 2019-02-14 RX ORDER — GABAPENTIN 400 MG/1
CAPSULE ORAL
Qty: 90 CAPSULE | Refills: 1 | Status: SHIPPED | OUTPATIENT
Start: 2019-03-14 | End: 2019-05-23 | Stop reason: SDUPTHER

## 2019-02-14 RX ORDER — HYDROCODONE BITARTRATE AND ACETAMINOPHEN 7.5; 325 MG/1; MG/1
1 TABLET ORAL EVERY 6 HOURS PRN
Qty: 120 TABLET | Refills: 0 | Status: SHIPPED | OUTPATIENT
Start: 2019-02-24 | End: 2019-03-22 | Stop reason: SDUPTHER

## 2019-02-14 ASSESSMENT — ENCOUNTER SYMPTOMS
RHINORRHEA: 1
SINUS PRESSURE: 1
SINUS PAIN: 1
COUGH: 1
SHORTNESS OF BREATH: 1
BACK PAIN: 1

## 2019-02-19 PROBLEM — K59.00 CONSTIPATED: Status: RESOLVED | Noted: 2018-05-14 | Resolved: 2019-02-19

## 2019-02-19 PROBLEM — N63.11 BREAST LUMP ON RIGHT SIDE AT 10 O'CLOCK POSITION: Status: RESOLVED | Noted: 2017-12-27 | Resolved: 2019-02-19

## 2019-02-19 PROBLEM — Z79.899 OTHER LONG TERM (CURRENT) DRUG THERAPY: Status: RESOLVED | Noted: 2017-01-04 | Resolved: 2019-02-19

## 2019-02-20 ENCOUNTER — TELEPHONE (OUTPATIENT)
Dept: PAIN MANAGEMENT | Age: 64
End: 2019-02-20

## 2019-02-20 DIAGNOSIS — M25.552 CHRONIC PAIN OF BOTH HIPS: ICD-10-CM

## 2019-02-20 DIAGNOSIS — G89.29 CHRONIC PAIN OF BOTH HIPS: ICD-10-CM

## 2019-02-20 DIAGNOSIS — M25.561 CHRONIC PAIN OF BOTH KNEES: Primary | ICD-10-CM

## 2019-02-20 DIAGNOSIS — M25.562 CHRONIC PAIN OF BOTH KNEES: Primary | ICD-10-CM

## 2019-02-20 DIAGNOSIS — G89.29 CHRONIC PAIN OF BOTH KNEES: Primary | ICD-10-CM

## 2019-02-20 DIAGNOSIS — M25.551 CHRONIC PAIN OF BOTH HIPS: ICD-10-CM

## 2019-02-22 ENCOUNTER — TELEPHONE (OUTPATIENT)
Dept: FAMILY MEDICINE CLINIC | Age: 64
End: 2019-02-22

## 2019-02-25 ENCOUNTER — TELEPHONE (OUTPATIENT)
Dept: PAIN MANAGEMENT | Age: 64
End: 2019-02-25

## 2019-02-28 ENCOUNTER — TELEPHONE (OUTPATIENT)
Dept: FAMILY MEDICINE CLINIC | Age: 64
End: 2019-02-28

## 2019-03-04 ENCOUNTER — HOSPITAL ENCOUNTER (OUTPATIENT)
Dept: GENERAL RADIOLOGY | Age: 64
Discharge: HOME | End: 2019-03-06
Payer: MEDICARE

## 2019-03-04 ENCOUNTER — HOSPITAL ENCOUNTER (OUTPATIENT)
Dept: MAMMOGRAPHY | Age: 64
Discharge: HOME | End: 2019-03-06
Payer: MEDICARE

## 2019-03-04 ENCOUNTER — HOSPITAL ENCOUNTER (OUTPATIENT)
Age: 64
Setting detail: OUTPATIENT SURGERY
Discharge: HOME | End: 2019-03-04
Attending: ANESTHESIOLOGY | Admitting: ANESTHESIOLOGY
Payer: MEDICARE

## 2019-03-04 ENCOUNTER — APPOINTMENT (OUTPATIENT)
Dept: GENERAL RADIOLOGY | Age: 64
End: 2019-03-04
Attending: ANESTHESIOLOGY
Payer: MEDICARE

## 2019-03-04 ENCOUNTER — HOSPITAL ENCOUNTER (OUTPATIENT)
Age: 64
Discharge: HOME | End: 2019-03-06
Payer: MEDICARE

## 2019-03-04 VITALS
DIASTOLIC BLOOD PRESSURE: 65 MMHG | TEMPERATURE: 97.9 F | BODY MASS INDEX: 30.4 KG/M2 | HEART RATE: 92 BPM | RESPIRATION RATE: 16 BRPM | WEIGHT: 165.2 LBS | OXYGEN SATURATION: 97 % | HEIGHT: 62 IN | SYSTOLIC BLOOD PRESSURE: 105 MMHG

## 2019-03-04 DIAGNOSIS — G89.29 CHRONIC PAIN OF BOTH KNEES: ICD-10-CM

## 2019-03-04 DIAGNOSIS — M25.551 CHRONIC PAIN OF BOTH HIPS: ICD-10-CM

## 2019-03-04 DIAGNOSIS — M25.552 CHRONIC PAIN OF BOTH HIPS: ICD-10-CM

## 2019-03-04 DIAGNOSIS — M25.562 CHRONIC PAIN OF BOTH KNEES: ICD-10-CM

## 2019-03-04 DIAGNOSIS — M25.561 CHRONIC PAIN OF BOTH KNEES: ICD-10-CM

## 2019-03-04 DIAGNOSIS — Z12.39 SCREENING FOR BREAST CANCER: ICD-10-CM

## 2019-03-04 DIAGNOSIS — G89.29 CHRONIC PAIN OF BOTH HIPS: ICD-10-CM

## 2019-03-04 PROCEDURE — 7100000010 HC PHASE II RECOVERY - FIRST 15 MIN: Performed by: ANESTHESIOLOGY

## 2019-03-04 PROCEDURE — 6360000002 HC RX W HCPCS: Performed by: ANESTHESIOLOGY

## 2019-03-04 PROCEDURE — 3600000055 HC PAIN LEVEL 3 ADDL 15 MIN: Performed by: ANESTHESIOLOGY

## 2019-03-04 PROCEDURE — 99152 MOD SED SAME PHYS/QHP 5/>YRS: CPT | Performed by: ANESTHESIOLOGY

## 2019-03-04 PROCEDURE — 2500000003 HC RX 250 WO HCPCS: Performed by: ANESTHESIOLOGY

## 2019-03-04 PROCEDURE — 64634 DESTROY C/TH FACET JNT ADDL: CPT | Performed by: ANESTHESIOLOGY

## 2019-03-04 PROCEDURE — 99153 MOD SED SAME PHYS/QHP EA: CPT | Performed by: ANESTHESIOLOGY

## 2019-03-04 PROCEDURE — 7100000011 HC PHASE II RECOVERY - ADDTL 15 MIN: Performed by: ANESTHESIOLOGY

## 2019-03-04 PROCEDURE — 2709999900 HC NON-CHARGEABLE SUPPLY: Performed by: ANESTHESIOLOGY

## 2019-03-04 PROCEDURE — 73562 X-RAY EXAM OF KNEE 3: CPT

## 2019-03-04 PROCEDURE — 3600000054 HC PAIN LEVEL 3 BASE: Performed by: ANESTHESIOLOGY

## 2019-03-04 PROCEDURE — 2580000003 HC RX 258: Performed by: ANESTHESIOLOGY

## 2019-03-04 PROCEDURE — 64633 DESTROY CERV/THOR FACET JNT: CPT | Performed by: ANESTHESIOLOGY

## 2019-03-04 PROCEDURE — 3209999900 FLUORO FOR SURGICAL PROCEDURES

## 2019-03-04 PROCEDURE — 77067 SCR MAMMO BI INCL CAD: CPT

## 2019-03-04 PROCEDURE — 73521 X-RAY EXAM HIPS BI 2 VIEWS: CPT

## 2019-03-04 RX ORDER — DEXAMETHASONE SODIUM PHOSPHATE 10 MG/ML
INJECTION INTRAMUSCULAR; INTRAVENOUS PRN
Status: DISCONTINUED | OUTPATIENT
Start: 2019-03-04 | End: 2019-03-04 | Stop reason: ALTCHOICE

## 2019-03-04 RX ORDER — SODIUM CHLORIDE 0.9 % (FLUSH) 0.9 %
10 SYRINGE (ML) INJECTION EVERY 12 HOURS SCHEDULED
Status: DISCONTINUED | OUTPATIENT
Start: 2019-03-04 | End: 2019-03-04 | Stop reason: HOSPADM

## 2019-03-04 RX ORDER — FENTANYL CITRATE 50 UG/ML
INJECTION, SOLUTION INTRAMUSCULAR; INTRAVENOUS PRN
Status: DISCONTINUED | OUTPATIENT
Start: 2019-03-04 | End: 2019-03-04 | Stop reason: ALTCHOICE

## 2019-03-04 RX ORDER — LIDOCAINE HYDROCHLORIDE 10 MG/ML
INJECTION, SOLUTION INFILTRATION; PERINEURAL PRN
Status: DISCONTINUED | OUTPATIENT
Start: 2019-03-04 | End: 2019-03-04 | Stop reason: ALTCHOICE

## 2019-03-04 RX ORDER — KETOROLAC TROMETHAMINE 30 MG/ML
INJECTION, SOLUTION INTRAMUSCULAR; INTRAVENOUS PRN
Status: DISCONTINUED | OUTPATIENT
Start: 2019-03-04 | End: 2019-03-04 | Stop reason: ALTCHOICE

## 2019-03-04 RX ORDER — MIDAZOLAM HYDROCHLORIDE 1 MG/ML
INJECTION INTRAMUSCULAR; INTRAVENOUS PRN
Status: DISCONTINUED | OUTPATIENT
Start: 2019-03-04 | End: 2019-03-04 | Stop reason: ALTCHOICE

## 2019-03-04 RX ORDER — SODIUM CHLORIDE 0.9 % (FLUSH) 0.9 %
10 SYRINGE (ML) INJECTION PRN
Status: DISCONTINUED | OUTPATIENT
Start: 2019-03-04 | End: 2019-03-04 | Stop reason: HOSPADM

## 2019-03-04 RX ORDER — LIDOCAINE HYDROCHLORIDE 40 MG/ML
INJECTION, SOLUTION RETROBULBAR; TOPICAL PRN
Status: DISCONTINUED | OUTPATIENT
Start: 2019-03-04 | End: 2019-03-04 | Stop reason: ALTCHOICE

## 2019-03-04 RX ADMIN — SODIUM CHLORIDE, PRESERVATIVE FREE 10 ML: 5 INJECTION INTRAVENOUS at 14:57

## 2019-03-04 ASSESSMENT — PAIN SCALES - GENERAL
PAINLEVEL_OUTOF10: 4
PAINLEVEL_OUTOF10: 8
PAINLEVEL_OUTOF10: 4
PAINLEVEL_OUTOF10: 0

## 2019-03-04 ASSESSMENT — PAIN - FUNCTIONAL ASSESSMENT: PAIN_FUNCTIONAL_ASSESSMENT: 0-10

## 2019-03-06 DIAGNOSIS — H04.129 DRY EYE: ICD-10-CM

## 2019-03-06 DIAGNOSIS — G25.81 RLS (RESTLESS LEGS SYNDROME): ICD-10-CM

## 2019-03-06 RX ORDER — CYCLOSPORINE 0.5 MG/ML
EMULSION OPHTHALMIC
Qty: 60 EACH | Refills: 5 | OUTPATIENT
Start: 2019-03-06

## 2019-03-06 RX ORDER — ROPINIROLE 3 MG/1
3 TABLET, FILM COATED ORAL NIGHTLY
Qty: 30 TABLET | OUTPATIENT
Start: 2019-03-06

## 2019-03-06 RX ORDER — IPRATROPIUM BROMIDE AND ALBUTEROL SULFATE 2.5; .5 MG/3ML; MG/3ML
SOLUTION RESPIRATORY (INHALATION)
Qty: 180 ML | Refills: 5 | OUTPATIENT
Start: 2019-03-06

## 2019-03-06 RX ORDER — RANITIDINE 300 MG/1
TABLET ORAL
Qty: 30 TABLET | OUTPATIENT
Start: 2019-03-06

## 2019-03-06 RX ORDER — CETIRIZINE HYDROCHLORIDE 10 MG/1
TABLET ORAL
Qty: 30 TABLET | OUTPATIENT
Start: 2019-03-06

## 2019-03-07 DIAGNOSIS — M17.0 PRIMARY OSTEOARTHRITIS OF BOTH KNEES: Primary | ICD-10-CM

## 2019-03-18 ENCOUNTER — OFFICE VISIT (OUTPATIENT)
Dept: FAMILY MEDICINE CLINIC | Age: 64
End: 2019-03-18
Payer: MEDICARE

## 2019-03-18 VITALS
BODY MASS INDEX: 29.52 KG/M2 | OXYGEN SATURATION: 97 % | DIASTOLIC BLOOD PRESSURE: 78 MMHG | TEMPERATURE: 98 F | HEART RATE: 108 BPM | SYSTOLIC BLOOD PRESSURE: 122 MMHG | WEIGHT: 161.4 LBS

## 2019-03-18 DIAGNOSIS — G25.81 RLS (RESTLESS LEGS SYNDROME): ICD-10-CM

## 2019-03-18 DIAGNOSIS — Z76.0 MEDICATION REFILL: ICD-10-CM

## 2019-03-18 DIAGNOSIS — R68.89 HEAT INTOLERANCE: ICD-10-CM

## 2019-03-18 DIAGNOSIS — H04.129 DRY EYE: ICD-10-CM

## 2019-03-18 DIAGNOSIS — E55.9 VITAMIN D DEFICIENCY: ICD-10-CM

## 2019-03-18 DIAGNOSIS — I10 ESSENTIAL HYPERTENSION: Primary | ICD-10-CM

## 2019-03-18 PROCEDURE — 3017F COLORECTAL CA SCREEN DOC REV: CPT | Performed by: NURSE PRACTITIONER

## 2019-03-18 PROCEDURE — G8482 FLU IMMUNIZE ORDER/ADMIN: HCPCS | Performed by: NURSE PRACTITIONER

## 2019-03-18 PROCEDURE — 4004F PT TOBACCO SCREEN RCVD TLK: CPT | Performed by: NURSE PRACTITIONER

## 2019-03-18 PROCEDURE — 99214 OFFICE O/P EST MOD 30 MIN: CPT | Performed by: NURSE PRACTITIONER

## 2019-03-18 PROCEDURE — G8417 CALC BMI ABV UP PARAM F/U: HCPCS | Performed by: NURSE PRACTITIONER

## 2019-03-18 PROCEDURE — G8427 DOCREV CUR MEDS BY ELIG CLIN: HCPCS | Performed by: NURSE PRACTITIONER

## 2019-03-18 PROCEDURE — G8598 ASA/ANTIPLAT THER USED: HCPCS | Performed by: NURSE PRACTITIONER

## 2019-03-18 RX ORDER — ROPINIROLE 3 MG/1
3 TABLET, FILM COATED ORAL NIGHTLY
Qty: 90 TABLET | Refills: 1 | Status: SHIPPED | OUTPATIENT
Start: 2019-03-18 | End: 2019-03-20

## 2019-03-18 RX ORDER — RANITIDINE 300 MG/1
TABLET ORAL
Qty: 90 TABLET | Refills: 1 | Status: SHIPPED | OUTPATIENT
Start: 2019-03-18 | End: 2019-03-20

## 2019-03-18 RX ORDER — NIFEDIPINE 60 MG/1
60 TABLET, EXTENDED RELEASE ORAL 2 TIMES DAILY
Qty: 180 TABLET | Refills: 1 | Status: SHIPPED | OUTPATIENT
Start: 2019-03-18 | End: 2019-08-14 | Stop reason: SDUPTHER

## 2019-03-18 RX ORDER — CYCLOSPORINE 0.5 MG/ML
EMULSION OPHTHALMIC
Qty: 60 EACH | Refills: 5 | Status: SHIPPED | OUTPATIENT
Start: 2019-03-18 | End: 2019-03-20

## 2019-03-18 RX ORDER — CETIRIZINE HYDROCHLORIDE 10 MG/1
TABLET ORAL
Qty: 90 TABLET | Refills: 1 | Status: SHIPPED | OUTPATIENT
Start: 2019-03-18 | End: 2019-03-20

## 2019-03-18 RX ORDER — LOSARTAN POTASSIUM 100 MG/1
100 TABLET ORAL DAILY
Qty: 90 TABLET | Refills: 1 | Status: SHIPPED | OUTPATIENT
Start: 2019-03-18 | End: 2019-08-14 | Stop reason: SDUPTHER

## 2019-03-18 ASSESSMENT — ENCOUNTER SYMPTOMS
COUGH: 0
SHORTNESS OF BREATH: 0

## 2019-03-20 RX ORDER — ROPINIROLE 3 MG/1
3 TABLET, FILM COATED ORAL NIGHTLY
Qty: 90 TABLET | Refills: 1 | Status: SHIPPED | OUTPATIENT
Start: 2019-03-20 | End: 2019-08-14

## 2019-03-20 RX ORDER — IPRATROPIUM BROMIDE AND ALBUTEROL SULFATE 2.5; .5 MG/3ML; MG/3ML
SOLUTION RESPIRATORY (INHALATION)
Qty: 90 ML | Refills: 5 | Status: SHIPPED | OUTPATIENT
Start: 2019-03-20 | End: 2019-08-14 | Stop reason: SDUPTHER

## 2019-03-20 RX ORDER — CYCLOSPORINE 0.5 MG/ML
EMULSION OPHTHALMIC
Qty: 60 EACH | Refills: 5 | Status: SHIPPED | OUTPATIENT
Start: 2019-03-20 | End: 2019-09-18 | Stop reason: SDUPTHER

## 2019-03-20 RX ORDER — CETIRIZINE HYDROCHLORIDE 10 MG/1
TABLET ORAL
Qty: 90 TABLET | Refills: 1 | Status: SHIPPED | OUTPATIENT
Start: 2019-03-20 | End: 2019-09-18 | Stop reason: SDUPTHER

## 2019-03-20 RX ORDER — RANITIDINE 300 MG/1
TABLET ORAL
Qty: 90 TABLET | Refills: 1 | Status: SHIPPED | OUTPATIENT
Start: 2019-03-20 | End: 2019-05-15 | Stop reason: SDUPTHER

## 2019-03-20 RX ORDER — MELATONIN
Qty: 90 TABLET | Refills: 1 | Status: SHIPPED | OUTPATIENT
Start: 2019-03-20 | End: 2019-05-15 | Stop reason: SDUPTHER

## 2019-03-22 ENCOUNTER — OFFICE VISIT (OUTPATIENT)
Dept: PAIN MANAGEMENT | Age: 64
End: 2019-03-22
Payer: MEDICARE

## 2019-03-22 VITALS
BODY MASS INDEX: 30.18 KG/M2 | SYSTOLIC BLOOD PRESSURE: 132 MMHG | DIASTOLIC BLOOD PRESSURE: 73 MMHG | HEART RATE: 100 BPM | WEIGHT: 164 LBS | HEIGHT: 62 IN | OXYGEN SATURATION: 96 %

## 2019-03-22 DIAGNOSIS — Z79.891 CHRONIC USE OF OPIATE DRUGS THERAPEUTIC PURPOSES: ICD-10-CM

## 2019-03-22 DIAGNOSIS — M54.50 CHRONIC BILATERAL LOW BACK PAIN WITHOUT SCIATICA: ICD-10-CM

## 2019-03-22 DIAGNOSIS — M47.817 LUMBOSACRAL SPONDYLOSIS WITHOUT MYELOPATHY: Primary | ICD-10-CM

## 2019-03-22 DIAGNOSIS — M47.812 SPONDYLOSIS OF CERVICAL REGION WITHOUT MYELOPATHY OR RADICULOPATHY: Chronic | ICD-10-CM

## 2019-03-22 DIAGNOSIS — G89.29 CHRONIC BILATERAL LOW BACK PAIN WITHOUT SCIATICA: ICD-10-CM

## 2019-03-22 DIAGNOSIS — Z79.899 CHRONIC PRESCRIPTION BENZODIAZEPINE USE: ICD-10-CM

## 2019-03-22 DIAGNOSIS — Z98.1 HX OF FUSION OF CERVICAL SPINE: ICD-10-CM

## 2019-03-22 PROCEDURE — 3017F COLORECTAL CA SCREEN DOC REV: CPT | Performed by: ANESTHESIOLOGY

## 2019-03-22 PROCEDURE — G8482 FLU IMMUNIZE ORDER/ADMIN: HCPCS | Performed by: ANESTHESIOLOGY

## 2019-03-22 PROCEDURE — 4004F PT TOBACCO SCREEN RCVD TLK: CPT | Performed by: ANESTHESIOLOGY

## 2019-03-22 PROCEDURE — G8427 DOCREV CUR MEDS BY ELIG CLIN: HCPCS | Performed by: ANESTHESIOLOGY

## 2019-03-22 PROCEDURE — G8598 ASA/ANTIPLAT THER USED: HCPCS | Performed by: ANESTHESIOLOGY

## 2019-03-22 PROCEDURE — G8417 CALC BMI ABV UP PARAM F/U: HCPCS | Performed by: ANESTHESIOLOGY

## 2019-03-22 PROCEDURE — 99214 OFFICE O/P EST MOD 30 MIN: CPT | Performed by: ANESTHESIOLOGY

## 2019-03-22 RX ORDER — HYDROCODONE BITARTRATE AND ACETAMINOPHEN 7.5; 325 MG/1; MG/1
1 TABLET ORAL EVERY 6 HOURS PRN
Qty: 120 TABLET | Refills: 0 | Status: SHIPPED | OUTPATIENT
Start: 2019-03-27 | End: 2019-04-22 | Stop reason: SDUPTHER

## 2019-03-22 ASSESSMENT — ENCOUNTER SYMPTOMS: BACK PAIN: 1

## 2019-04-01 ENCOUNTER — TELEPHONE (OUTPATIENT)
Dept: FAMILY MEDICINE CLINIC | Age: 64
End: 2019-04-01

## 2019-04-02 NOTE — TELEPHONE ENCOUNTER
I do not see spiriva on her med list. She has been more stable this winter than ever so the bevespi is what I would stick with. Exactly why accurate medication lists are important.

## 2019-04-03 ENCOUNTER — OFFICE VISIT (OUTPATIENT)
Dept: OBGYN CLINIC | Age: 64
End: 2019-04-03
Payer: MEDICARE

## 2019-04-03 VITALS
HEIGHT: 61 IN | BODY MASS INDEX: 30.85 KG/M2 | DIASTOLIC BLOOD PRESSURE: 87 MMHG | SYSTOLIC BLOOD PRESSURE: 137 MMHG | HEART RATE: 95 BPM | WEIGHT: 163.4 LBS

## 2019-04-03 DIAGNOSIS — R68.89 HEAT INTOLERANCE: Primary | ICD-10-CM

## 2019-04-03 PROCEDURE — 99202 OFFICE O/P NEW SF 15 MIN: CPT | Performed by: ADVANCED PRACTICE MIDWIFE

## 2019-04-03 PROCEDURE — G8417 CALC BMI ABV UP PARAM F/U: HCPCS | Performed by: ADVANCED PRACTICE MIDWIFE

## 2019-04-03 PROCEDURE — 4004F PT TOBACCO SCREEN RCVD TLK: CPT | Performed by: ADVANCED PRACTICE MIDWIFE

## 2019-04-03 PROCEDURE — 3017F COLORECTAL CA SCREEN DOC REV: CPT | Performed by: ADVANCED PRACTICE MIDWIFE

## 2019-04-03 PROCEDURE — G8598 ASA/ANTIPLAT THER USED: HCPCS | Performed by: ADVANCED PRACTICE MIDWIFE

## 2019-04-03 PROCEDURE — G8427 DOCREV CUR MEDS BY ELIG CLIN: HCPCS | Performed by: ADVANCED PRACTICE MIDWIFE

## 2019-04-03 ASSESSMENT — ENCOUNTER SYMPTOMS
NAUSEA: 0
SHORTNESS OF BREATH: 0
ABDOMINAL PAIN: 0
DIARRHEA: 0
VOMITING: 0

## 2019-04-03 NOTE — PROGRESS NOTES
[address] [place]  [facility]  [address]  [address]  Dept: [phone]    Patient Name: Chelsey Perales  Patient Age: 62 y.o. Date of Visit: 4/3/2019    Subjective  Chief Complaint   Patient presents with    Follow-up     sensitive to heat, referred by pcp     No LMP recorded. Patient is postmenopausal.    Pt arrives as new patient recommended from [address] [place] [address] having intense heat intolerance  Reports at 46years old going through menopause and never 'skipping a beat'  Reports getting mammograms and pap smears through PCP  Reports having had thyroids checked and they're WNL  Reports has not been sexually active x16 years  Reports is a smoker  Reports having COPD and CAD and HTN on medications        OB History        2    Para   2    Term   2            AB        Living   2       SAB        TAB        Ectopic        Molar        Multiple        Live Births                  Past Medical History:   Diagnosis Date    Anxiety     CAD (coronary artery disease)     Carotid artery stenosis     Cataract     COPD (chronic obstructive pulmonary disease) ([address]     Depression     Esophageal reflux     Fibromyalgia     Head injury     Headache(784.0)     Hearing loss     Heart attack ([address]     Hyperlipidemia     Hypertension     Insomnia     Migraine     Osteoarthritis     Pneumonia     Reflux     TIA (transient ischemic attack)     Ulcerative colitis ([address]     Unspecified cerebral artery occlusion with cerebral infarction      Current Outpatient Medications   Medication Sig Dispense Refill    HYDROcodone-acetaminophen (NORCO) 7.5-325 MG per tablet Take 1 tablet by mouth every 6 hours as needed for Pain for up to 30 days.  120 tablet 0    cetirizine (ZYRTEC) 10 MG tablet TAKE 1 TABLET BY MOUTH DAILY 90 tablet 1    ipratropium-albuterol (DUONEB) 0.5-2.5 (3) MG/3ML SOLN nebulizer solution INHALE 1 VIAL VIA NEBULIZER EVERY 6 HOURS AS NEEDED FOR SHORTNESS OF BREATH 90 mL 5    ranitidine (ZANTAC) 300 MG tablet TAKE 1 TABLET BY MOUTH NIGHTLY 90 tablet 1    rOPINIRole (REQUIP) 3 MG tablet TAKE 1 TABLET BY MOUTH NIGHTLY 90 tablet 1    RESTASIS 0.05 % ophthalmic emulsion INSTILL 1 DROP INTO BOTH EYES TWICE DAILY 60 each 5    Cholecalciferol (VITAMIN D3) 1000 units TABS TAKE 1 TABLET BY MOUTH DAILY 90 tablet 1    losartan (COZAAR) 100 MG tablet Take 1 tablet by mouth daily 90 tablet 1    NIFEdipine (PROCARDIA XL) 60 MG extended release tablet Take 1 tablet by mouth 2 times daily 180 tablet 1    gabapentin (NEURONTIN) 400 MG capsule TAKE 1 CAPSULE BY MOUTH THREE TIMES DAILY. 90 capsule 1    VENTOLIN  (90 Base) MCG/ACT inhaler INHALE 2 PUFFS INTO THE LUNGS EVERY 6 HOURS AS NEEDED FOR WHEEZING 18 g 5    glycopyrrolate-formoterol (BEVESPI) 9-4.8 MCG/ACT AERO Inhale 2 puffs into the lungs 2 times daily 1 Inhaler 5    ergocalciferol (DRISDOL) 26615 units capsule Take 1 capsule by mouth once a week 4 capsule 3    meloxicam (MOBIC) 15 MG tablet TAKE 1 TABLET BY MOUTH DAILY 30 tablet 5    buPROPion (WELLBUTRIN XL) 150 MG extended release tablet Take 150 mg by mouth daily Taking 400 mg daily (2 of 200s)      FLOVENT  MCG/ACT inhaler INHALE 2 PUFFS INTO THE LUNGS TWICE DAILY (Patient taking differently: INHALE 2 PUFFS INTO THE LUNGS TWICE DAILY PRN) 12 g 5    ALPRAZolam (XANAX) 1 MG tablet Take by mouth as needed .  DULoxetine (CYMBALTA) 30 MG extended release capsule Take 1 capsule by mouth daily 30 capsule 5    DULoxetine (CYMBALTA) 60 MG extended release capsule Take 1 capsule by mouth daily 30 capsule 5    mometasone (ELOCON) 0.1 % cream APPLY TOPICALLY DAILY 30 g 1    aspirin 81 MG tablet Take 81 mg by mouth daily      Nebulizers (COMPRESSOR/NEBULIZER) MISC Use with albuterol solution every 6 hours as needed.  1 each 0     Current Facility-Administered Medications   Medication Dose Route Frequency Provider Last Rate Last Dose    triamcinolone acetonide (KENALOG-40) injection 40 mg  40 mg Intramuscular Once Keerthi Butler, APRN - CNP         Review of Systems   Constitutional: Negative for unexpected weight change. Heat intolerance     Respiratory: Negative for shortness of breath. Cardiovascular: Negative for chest pain, palpitations and leg swelling. Gastrointestinal: Negative for abdominal pain, diarrhea, nausea and vomiting. Genitourinary: Negative for difficulty urinating, dyspareunia, menstrual problem, vaginal discharge and vaginal pain. Neurological: Negative for dizziness, light-headedness and headaches. Objective  /87 (Site: Left Upper Arm, Position: Sitting, Cuff Size: Medium Adult)   Pulse 95   Ht 5' 1\" (1.549 m)   Wt 163 lb 6.4 oz (74.1 kg)   BMI 30.87 kg/m²     Physical Exam   Constitutional: She is oriented to person, place, and time. She appears well-developed and well-nourished. No distress. Neck: Normal range of motion. No thyromegaly present. Cardiovascular: Normal rate and regular rhythm. Pulmonary/Chest: Effort normal and breath sounds normal. No respiratory distress. Genitourinary:   Genitourinary Comments: deferred   Musculoskeletal: Normal range of motion. Neurological: She is alert and oriented to person, place, and time. Skin: Skin is warm and dry. She is not diaphoretic. Psychiatric: She has a normal mood and affect. Her behavior is normal. Judgment and thought content normal.   Vitals reviewed. Assessment & Plan  1. Heat intolerance  -Discussed that often times with menopause and hormonal fluctuations we often find times of hot flashes and not heat intolerance. Discussed importance of checking thyroids.  -Discussed due to chronic health conditions patient would not be a prime candidate for hormonal therapy due to chronic health conditions.  Discussed that alternatives for hot flashes for women not prime candidates for hormonal therapy include SSRIs and

## 2019-04-08 ENCOUNTER — TELEPHONE (OUTPATIENT)
Dept: FAMILY MEDICINE CLINIC | Age: 64
End: 2019-04-08

## 2019-04-08 DIAGNOSIS — R23.2 HOT FLASHES: Primary | ICD-10-CM

## 2019-04-08 DIAGNOSIS — E34.9 HORMONE IMBALANCE: ICD-10-CM

## 2019-04-11 ENCOUNTER — HOSPITAL ENCOUNTER (OUTPATIENT)
Age: 64
Setting detail: SPECIMEN
Discharge: HOME | End: 2019-04-11
Payer: MEDICARE

## 2019-04-11 DIAGNOSIS — R68.89 HEAT INTOLERANCE: ICD-10-CM

## 2019-04-11 DIAGNOSIS — R23.2 HOT FLASHES: ICD-10-CM

## 2019-04-11 DIAGNOSIS — E34.9 HORMONE IMBALANCE: ICD-10-CM

## 2019-04-11 LAB
CORTISOL COLLECTION INFO: NORMAL
CORTISOL: 9 UG/DL (ref 2.7–18.4)
ESTRADIOL LEVEL: 14 PG/ML (ref 5–50)
FOLLICLE STIMULATING HORMONE: 73.4 U/L (ref 25.8–134.8)
LH: 16.1 U/L (ref 7.7–58.5)
PROGESTERONE LEVEL: 0.07 NG/ML
TESTOSTERONE TOTAL: <3 NG/DL (ref 20–70)
TSH SERPL DL<=0.05 MIU/L-ACNC: 1.46 MIU/L (ref 0.3–5)
VITAMIN D 25-HYDROXY: 43.1 NG/ML (ref 30–100)

## 2019-04-12 ENCOUNTER — TELEPHONE (OUTPATIENT)
Dept: FAMILY MEDICINE CLINIC | Age: 64
End: 2019-04-12

## 2019-04-12 LAB — DHEAS (DHEA SULFATE): 17.3 UG/DL (ref 13–130)

## 2019-04-12 NOTE — TELEPHONE ENCOUNTER
Patient states ranitidine 300 mg is not working for her heartburn. She has been pepto bismol multiple times daily. Pt would like you to call in bentyl and Carafate. Please advise.

## 2019-04-15 ENCOUNTER — HOSPITAL ENCOUNTER (OUTPATIENT)
Age: 64
Setting detail: OUTPATIENT SURGERY
Discharge: HOME | End: 2019-04-15
Attending: ANESTHESIOLOGY | Admitting: ANESTHESIOLOGY
Payer: MEDICARE

## 2019-04-15 ENCOUNTER — APPOINTMENT (OUTPATIENT)
Dept: GENERAL RADIOLOGY | Age: 64
End: 2019-04-15
Attending: ANESTHESIOLOGY
Payer: MEDICARE

## 2019-04-15 VITALS
WEIGHT: 160.05 LBS | HEIGHT: 61 IN | TEMPERATURE: 97.5 F | DIASTOLIC BLOOD PRESSURE: 77 MMHG | BODY MASS INDEX: 30.22 KG/M2 | SYSTOLIC BLOOD PRESSURE: 139 MMHG | OXYGEN SATURATION: 93 % | HEART RATE: 84 BPM | RESPIRATION RATE: 16 BRPM

## 2019-04-15 PROBLEM — G89.29 CHRONIC BILATERAL LOW BACK PAIN WITHOUT SCIATICA: Chronic | Status: ACTIVE | Noted: 2019-03-22

## 2019-04-15 PROBLEM — M54.50 CHRONIC BILATERAL LOW BACK PAIN WITHOUT SCIATICA: Chronic | Status: ACTIVE | Noted: 2019-03-22

## 2019-04-15 LAB — ESTRONE: 7.2 PG/ML

## 2019-04-15 PROCEDURE — 99153 MOD SED SAME PHYS/QHP EA: CPT | Performed by: ANESTHESIOLOGY

## 2019-04-15 PROCEDURE — 2709999900 HC NON-CHARGEABLE SUPPLY: Performed by: ANESTHESIOLOGY

## 2019-04-15 PROCEDURE — 3209999900 FLUORO FOR SURGICAL PROCEDURES

## 2019-04-15 PROCEDURE — 6360000002 HC RX W HCPCS: Performed by: ANESTHESIOLOGY

## 2019-04-15 PROCEDURE — 99152 MOD SED SAME PHYS/QHP 5/>YRS: CPT | Performed by: ANESTHESIOLOGY

## 2019-04-15 PROCEDURE — 7100000010 HC PHASE II RECOVERY - FIRST 15 MIN: Performed by: ANESTHESIOLOGY

## 2019-04-15 PROCEDURE — 2580000003 HC RX 258: Performed by: ANESTHESIOLOGY

## 2019-04-15 PROCEDURE — 64495 INJ PARAVERT F JNT L/S 3 LEV: CPT | Performed by: ANESTHESIOLOGY

## 2019-04-15 PROCEDURE — 2500000003 HC RX 250 WO HCPCS: Performed by: ANESTHESIOLOGY

## 2019-04-15 PROCEDURE — 64493 INJ PARAVERT F JNT L/S 1 LEV: CPT | Performed by: ANESTHESIOLOGY

## 2019-04-15 PROCEDURE — 3600000051 HC PAIN LEVEL 1 ADDL 15 MIN: Performed by: ANESTHESIOLOGY

## 2019-04-15 PROCEDURE — 3600000050 HC PAIN LEVEL 1 BASE: Performed by: ANESTHESIOLOGY

## 2019-04-15 PROCEDURE — 6360000004 HC RX CONTRAST MEDICATION: Performed by: ANESTHESIOLOGY

## 2019-04-15 PROCEDURE — 64494 INJ PARAVERT F JNT L/S 2 LEV: CPT | Performed by: ANESTHESIOLOGY

## 2019-04-15 PROCEDURE — 7100000011 HC PHASE II RECOVERY - ADDTL 15 MIN: Performed by: ANESTHESIOLOGY

## 2019-04-15 RX ORDER — LIDOCAINE HYDROCHLORIDE 10 MG/ML
INJECTION, SOLUTION INFILTRATION; PERINEURAL PRN
Status: DISCONTINUED | OUTPATIENT
Start: 2019-04-15 | End: 2019-04-15 | Stop reason: ALTCHOICE

## 2019-04-15 RX ORDER — BUPIVACAINE HYDROCHLORIDE 5 MG/ML
INJECTION, SOLUTION EPIDURAL; INTRACAUDAL PRN
Status: DISCONTINUED | OUTPATIENT
Start: 2019-04-15 | End: 2019-04-15 | Stop reason: ALTCHOICE

## 2019-04-15 RX ORDER — SODIUM CHLORIDE 0.9 % (FLUSH) 0.9 %
10 SYRINGE (ML) INJECTION EVERY 12 HOURS SCHEDULED
Status: DISCONTINUED | OUTPATIENT
Start: 2019-04-15 | End: 2019-04-15 | Stop reason: HOSPADM

## 2019-04-15 RX ORDER — MIDAZOLAM HYDROCHLORIDE 1 MG/ML
INJECTION INTRAMUSCULAR; INTRAVENOUS PRN
Status: DISCONTINUED | OUTPATIENT
Start: 2019-04-15 | End: 2019-04-15 | Stop reason: ALTCHOICE

## 2019-04-15 RX ORDER — SODIUM CHLORIDE 0.9 % (FLUSH) 0.9 %
10 SYRINGE (ML) INJECTION PRN
Status: DISCONTINUED | OUTPATIENT
Start: 2019-04-15 | End: 2019-04-15 | Stop reason: HOSPADM

## 2019-04-15 RX ORDER — FENTANYL CITRATE 50 UG/ML
INJECTION, SOLUTION INTRAMUSCULAR; INTRAVENOUS PRN
Status: DISCONTINUED | OUTPATIENT
Start: 2019-04-15 | End: 2019-04-15 | Stop reason: ALTCHOICE

## 2019-04-15 RX ADMIN — SODIUM CHLORIDE, PRESERVATIVE FREE 10 ML: 5 INJECTION INTRAVENOUS at 07:59

## 2019-04-15 ASSESSMENT — PAIN SCALES - GENERAL
PAINLEVEL_OUTOF10: 4
PAINLEVEL_OUTOF10: 0
PAINLEVEL_OUTOF10: 4

## 2019-04-15 ASSESSMENT — PAIN - FUNCTIONAL ASSESSMENT: PAIN_FUNCTIONAL_ASSESSMENT: 0-10

## 2019-04-15 ASSESSMENT — PAIN DESCRIPTION - DESCRIPTORS: DESCRIPTORS: ACHING
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
## 2019-04-15 NOTE — OP NOTE
Patient Name: Cruz Fu   YOB: 1955  Room/Bed: STAZ OR Pool/NONE  Medical Record Number: [id number]  Date: 4/15/2019       Sedation/ Anesthesia Plan:   intravenous sedation   as needed. Medications Planned:   midazolam (Versed) / Fentanyl  Intravenously  as needed. Preoperative Diagnosis: Lumbar spondylosis w/o myelopathy or radiculopathy  Postoperative Diagnosis: Lumbar spondylosis w/o myelopathy or radiculopathy    Procedure Performed:  Bilateral Lumbar Medial Branch nerve Blocks at the transverse processes of L3, L4, L5 and sacral  ala under fluoroscopy guidance    Procedure: The Patient was seen in the preop area, chart was reviewed, informed consent was obtained. Patient was taken to procedure room and was placed in prone position. Vital signs were monitored through out the  Procedure. A time out was completed. The skin over the back was prepped and draped in sterile manner. The target point was marked at the junction of Transverse process and superior articular process at the target levels. Skin and deep tissues were anesthetized with 1 % lidocaine. A 25-gauge needlele was advanced to the target spots under fluoroscopy guidance in AP / Lateral and Oblique views. Then after negative aspiration contrast dye was injected with live fluoroscopy in AP views that showed  spread of the contrast with no epidural space and no vascular runoff or intrathecal spread. Finally 0.5 ml of treatment solution 0.5% bupivacaine  was injected at each level. The needle was removed and a Band-Aid was placed over the needle  insertion site. The procedure was was perform on the right side and was then repeated on the left side at the same levels with the same technique  The patient's vital signs remained stable and the patient tolerated the procedure well.       Post Procedure pain score in PACU was assessed with ambulation 0-1/10    Electronically signed by John Johnson MD on 4/15/2019 at 9:15 AM

## 2019-04-17 ENCOUNTER — TELEPHONE (OUTPATIENT)
Dept: PAIN MANAGEMENT | Age: 64
End: 2019-04-17

## 2019-04-17 NOTE — TELEPHONE ENCOUNTER
Patient called back and states she can come in if  has any openings on 4/22, because she is scheduled on 4/29. If she wait that long for refills she will be out of medications. You have 2 opening on 4/22. Is this ok to put patient on this date.

## 2019-04-22 ENCOUNTER — APPOINTMENT (OUTPATIENT)
Dept: GENERAL RADIOLOGY | Age: 64
End: 2019-04-22
Attending: ANESTHESIOLOGY
Payer: MEDICARE

## 2019-04-22 ENCOUNTER — HOSPITAL ENCOUNTER (OUTPATIENT)
Age: 64
Setting detail: OUTPATIENT SURGERY
Discharge: HOME | End: 2019-04-22
Attending: ANESTHESIOLOGY | Admitting: ANESTHESIOLOGY
Payer: MEDICARE

## 2019-04-22 ENCOUNTER — OFFICE VISIT (OUTPATIENT)
Dept: FAMILY MEDICINE CLINIC | Age: 64
End: 2019-04-22
Payer: MEDICARE

## 2019-04-22 VITALS
HEIGHT: 61 IN | WEIGHT: 159 LBS | OXYGEN SATURATION: 98 % | BODY MASS INDEX: 30.02 KG/M2 | DIASTOLIC BLOOD PRESSURE: 80 MMHG | TEMPERATURE: 98 F | HEART RATE: 105 BPM | SYSTOLIC BLOOD PRESSURE: 132 MMHG | RESPIRATION RATE: 18 BRPM

## 2019-04-22 VITALS
DIASTOLIC BLOOD PRESSURE: 74 MMHG | OXYGEN SATURATION: 97 % | HEIGHT: 62 IN | SYSTOLIC BLOOD PRESSURE: 136 MMHG | WEIGHT: 159.7 LBS | TEMPERATURE: 97.9 F | BODY MASS INDEX: 29.39 KG/M2 | HEART RATE: 87 BPM | RESPIRATION RATE: 12 BRPM

## 2019-04-22 DIAGNOSIS — R10.13 EPIGASTRIC PAIN: ICD-10-CM

## 2019-04-22 DIAGNOSIS — E34.9 HORMONE IMBALANCE: ICD-10-CM

## 2019-04-22 DIAGNOSIS — M47.812 SPONDYLOSIS OF CERVICAL REGION WITHOUT MYELOPATHY OR RADICULOPATHY: Chronic | ICD-10-CM

## 2019-04-22 DIAGNOSIS — F41.9 ANXIETY: Primary | ICD-10-CM

## 2019-04-22 DIAGNOSIS — Z79.891 CHRONIC USE OF OPIATE DRUGS THERAPEUTIC PURPOSES: ICD-10-CM

## 2019-04-22 PROCEDURE — 2500000003 HC RX 250 WO HCPCS: Performed by: ANESTHESIOLOGY

## 2019-04-22 PROCEDURE — 64495 INJ PARAVERT F JNT L/S 3 LEV: CPT | Performed by: ANESTHESIOLOGY

## 2019-04-22 PROCEDURE — 3600000051 HC PAIN LEVEL 1 ADDL 15 MIN: Performed by: ANESTHESIOLOGY

## 2019-04-22 PROCEDURE — 3017F COLORECTAL CA SCREEN DOC REV: CPT | Performed by: NURSE PRACTITIONER

## 2019-04-22 PROCEDURE — 7100000011 HC PHASE II RECOVERY - ADDTL 15 MIN: Performed by: ANESTHESIOLOGY

## 2019-04-22 PROCEDURE — 7100000010 HC PHASE II RECOVERY - FIRST 15 MIN: Performed by: ANESTHESIOLOGY

## 2019-04-22 PROCEDURE — 6360000002 HC RX W HCPCS: Performed by: ANESTHESIOLOGY

## 2019-04-22 PROCEDURE — 99153 MOD SED SAME PHYS/QHP EA: CPT | Performed by: ANESTHESIOLOGY

## 2019-04-22 PROCEDURE — G8417 CALC BMI ABV UP PARAM F/U: HCPCS | Performed by: NURSE PRACTITIONER

## 2019-04-22 PROCEDURE — 4004F PT TOBACCO SCREEN RCVD TLK: CPT | Performed by: NURSE PRACTITIONER

## 2019-04-22 PROCEDURE — 3600000050 HC PAIN LEVEL 1 BASE: Performed by: ANESTHESIOLOGY

## 2019-04-22 PROCEDURE — G8598 ASA/ANTIPLAT THER USED: HCPCS | Performed by: NURSE PRACTITIONER

## 2019-04-22 PROCEDURE — 99152 MOD SED SAME PHYS/QHP 5/>YRS: CPT | Performed by: ANESTHESIOLOGY

## 2019-04-22 PROCEDURE — 6360000004 HC RX CONTRAST MEDICATION: Performed by: ANESTHESIOLOGY

## 2019-04-22 PROCEDURE — G8427 DOCREV CUR MEDS BY ELIG CLIN: HCPCS | Performed by: NURSE PRACTITIONER

## 2019-04-22 PROCEDURE — 99214 OFFICE O/P EST MOD 30 MIN: CPT | Performed by: NURSE PRACTITIONER

## 2019-04-22 PROCEDURE — 64494 INJ PARAVERT F JNT L/S 2 LEV: CPT | Performed by: ANESTHESIOLOGY

## 2019-04-22 PROCEDURE — 2709999900 HC NON-CHARGEABLE SUPPLY: Performed by: ANESTHESIOLOGY

## 2019-04-22 PROCEDURE — 64493 INJ PARAVERT F JNT L/S 1 LEV: CPT | Performed by: ANESTHESIOLOGY

## 2019-04-22 PROCEDURE — 3209999900 FLUORO FOR SURGICAL PROCEDURES

## 2019-04-22 RX ORDER — LIDOCAINE HYDROCHLORIDE 10 MG/ML
INJECTION, SOLUTION INFILTRATION; PERINEURAL PRN
Status: DISCONTINUED | OUTPATIENT
Start: 2019-04-22 | End: 2019-04-22 | Stop reason: ALTCHOICE

## 2019-04-22 RX ORDER — SODIUM CHLORIDE 0.9 % (FLUSH) 0.9 %
10 SYRINGE (ML) INJECTION EVERY 12 HOURS SCHEDULED
Status: DISCONTINUED | OUTPATIENT
Start: 2019-04-22 | End: 2019-04-22 | Stop reason: HOSPADM

## 2019-04-22 RX ORDER — BUPIVACAINE HYDROCHLORIDE 5 MG/ML
INJECTION, SOLUTION EPIDURAL; INTRACAUDAL PRN
Status: DISCONTINUED | OUTPATIENT
Start: 2019-04-22 | End: 2019-04-22 | Stop reason: ALTCHOICE

## 2019-04-22 RX ORDER — ASPIRIN 325 MG
325 TABLET ORAL PRN
COMMUNITY
End: 2021-12-15

## 2019-04-22 RX ORDER — HYDROCODONE BITARTRATE AND ACETAMINOPHEN 7.5; 325 MG/1; MG/1
1 TABLET ORAL EVERY 6 HOURS PRN
Qty: 120 TABLET | Refills: 0 | Status: SHIPPED | OUTPATIENT
Start: 2019-04-26 | End: 2019-05-23 | Stop reason: SDUPTHER

## 2019-04-22 RX ORDER — SODIUM CHLORIDE 0.9 % (FLUSH) 0.9 %
10 SYRINGE (ML) INJECTION PRN
Status: DISCONTINUED | OUTPATIENT
Start: 2019-04-22 | End: 2019-04-22

## 2019-04-22 RX ORDER — SODIUM CHLORIDE 0.9 % (FLUSH) 0.9 %
10 SYRINGE (ML) INJECTION PRN
Status: DISCONTINUED | OUTPATIENT
Start: 2019-04-22 | End: 2019-04-22 | Stop reason: HOSPADM

## 2019-04-22 RX ORDER — DEXAMETHASONE SODIUM PHOSPHATE 10 MG/ML
INJECTION INTRAMUSCULAR; INTRAVENOUS PRN
Status: DISCONTINUED | OUTPATIENT
Start: 2019-04-22 | End: 2019-04-22 | Stop reason: ALTCHOICE

## 2019-04-22 RX ORDER — SUCRALFATE ORAL 1 G/10ML
1 SUSPENSION ORAL 4 TIMES DAILY
Qty: 1200 ML | Refills: 3 | Status: SHIPPED | OUTPATIENT
Start: 2019-04-22 | End: 2019-08-06 | Stop reason: SDUPTHER

## 2019-04-22 RX ORDER — SODIUM CHLORIDE 0.9 % (FLUSH) 0.9 %
10 SYRINGE (ML) INJECTION EVERY 12 HOURS SCHEDULED
Status: DISCONTINUED | OUTPATIENT
Start: 2019-04-22 | End: 2019-04-22

## 2019-04-22 RX ORDER — FENTANYL CITRATE 50 UG/ML
INJECTION, SOLUTION INTRAMUSCULAR; INTRAVENOUS PRN
Status: DISCONTINUED | OUTPATIENT
Start: 2019-04-22 | End: 2019-04-22 | Stop reason: ALTCHOICE

## 2019-04-22 RX ORDER — MIDAZOLAM HYDROCHLORIDE 1 MG/ML
INJECTION INTRAMUSCULAR; INTRAVENOUS PRN
Status: DISCONTINUED | OUTPATIENT
Start: 2019-04-22 | End: 2019-04-22 | Stop reason: ALTCHOICE

## 2019-04-22 ASSESSMENT — PAIN DESCRIPTION - DESCRIPTORS: DESCRIPTORS: BURNING

## 2019-04-22 ASSESSMENT — ENCOUNTER SYMPTOMS
COUGH: 0
BACK PAIN: 1
ABDOMINAL PAIN: 1

## 2019-04-22 ASSESSMENT — PAIN SCALES - GENERAL
PAINLEVEL_OUTOF10: 0
PAINLEVEL_OUTOF10: 0
PAINLEVEL_OUTOF10: 3
PAINLEVEL_OUTOF10: 0

## 2019-04-22 ASSESSMENT — PAIN - FUNCTIONAL ASSESSMENT: PAIN_FUNCTIONAL_ASSESSMENT: 0-10

## 2019-04-22 NOTE — PROGRESS NOTES
Pt up ambulated greater than 25 ft without difficulty. Returned to recliner in anticipation of discharge.

## 2019-04-22 NOTE — PROGRESS NOTES
Dr. Christen Allen informed that patient took two regular strength aspirin on Saturday. No orders received.
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
## 2019-04-22 NOTE — OP NOTE
Patient Name: Jennifer Mckeon   YOB: 1955  Room/Bed: STAZ OR Pool/NONE  Medical Record Number: [id number]  Date: 4/22/2019       Sedation/ Anesthesia Plan:   intravenous sedation   as needed. Medications Planned:   midazolam (Versed) / Fentanyl  Intravenously  as needed. Preoperative Diagnosis: Lumbar spondylosis w/o myelopathy or radiculopathy  Postoperative Diagnosis: Lumbar spondylosis w/o myelopathy or radiculopathy    Procedure Performed:  Bilateral Lumbar Medial Branch nerve Blocks at the transverse processes of L3, L4, L5 and sacral  ala under fluoroscopy guidance    Procedure: The Patient was seen in the preop area, chart was reviewed, informed consent was obtained. Patient was taken to procedure room and was placed in prone position. Vital signs were monitored through out the  Procedure. A time out was completed. The skin over the back was prepped and draped in sterile manner. The target point was marked at the junction of Transverse process and superior articular process at the target levels. Skin and deep tissues were anesthetized with 1 % lidocaine. A 25-gauge needlele was advanced to the target spots under fluoroscopy guidance in AP / Lateral and Oblique views. Then after negative aspiration contrast dye was injected with live fluoroscopy in AP views that showed  spread of the contrast with no epidural space and no vascular runoff or intrathecal spread. Finally 0.5 ml of treatment solution for mL of 0.5% bupivacaine mixed with 1 mL of dexamethasone 10 mg  was injected at each level. The needle was removed and a Band-Aid was placed over the needle  insertion site. The patient's vital signs remained stable and the patient tolerated the procedure well.       Post Procedure pain score in PACU was assessed with ambulation 0/10    Electronically signed by Jose Peasron MD on 4/22/2019 at 10:25 AM

## 2019-04-22 NOTE — PROGRESS NOTES
[address] [facility] [address]  Dept: [phone]  Dept Fax: [phone]      Santa Mathews is a 62 y.o. female who presents today for hermedical conditions/complaints as noted below. Santa Mathews is c/o of Fatigue (discuss further testing for thyriod \" overheated\" ); Sweats; and Abdominal Pain (\"nervoius stomach\")            HPI:      PAM Clifford is here today for abdominal pain of the last month. She describes it has a nervous stomach. She has a lot of stress in her life right now. She denies nausea and vomiting. She states it feels like there is a  in her stomach. She has been taking her sisters Carafate and it has relieved her pain. She has also been taking pepto bismol. She is taking her ranitidine daily. She was also inquiring about lab results. She is still having heat and cold intolerance. She is going to be seeing Иван at  [facility].         Hemoglobin A1C (%)   Date Value   12/12/2012 5.2   12/16/2011 5.4   09/22/2011 5.2             ( goal A1Cis < 7)   No results found for: LABMICR  LDL Cholesterol (mg/dL)   Date Value   01/24/2017 159 (H)   03/20/2014 210 (H)   12/12/2012 139 (H)     LDL Calculated (mg/dL)   Date Value   08/30/2018 145       (goal LDL is <100)   AST (U/L)   Date Value   08/30/2018 26     ALT (U/L)   Date Value   08/30/2018 26     BUN (mg/dL)   Date Value   08/30/2018 12     BP Readings from Last 3 Encounters:   04/22/19 132/80   04/22/19 136/74   04/15/19 139/77          (goal 120/80)    Past Medical History:   Diagnosis Date    Anxiety     CAD (coronary artery disease)     Carotid artery stenosis     Cataract     COPD (chronic obstructive pulmonary disease) (HCC)     Depression     Esophageal reflux     Fibromyalgia     Head injury     Headache(784.0)     Hearing loss     Heart attack ([address]     Hyperlipidemia     Hypertension     Insomnia     Migraine     Osteoarthritis     Pneumonia     Reflux     TIA (transient ischemic attack)     Ulcerative colitis ([facility]     Unspecified cerebral artery occlusion with cerebral infarction       Past Surgical History:   Procedure Laterality Date    ANESTHESIA NERVE BLOCK Left 11/13/2017    lumbar epidural steroid injection  performed by Rex Hashimoto, MD at [facility] Left 1/29/2018    NERVE BLOCK LEFT CERVICAL MEDIAL BRANCH C3 TON C4 C5 performed by Rex Hashimoto, MD at [facility] Bilateral 4/15/2019    BILATERAL LUMBAR MEDIAL BRANCH NERVE BLOCK L2-L5 performed by Rex Hashimoto, MD at [facility] Bilateral 4/22/2019    BILATERAL LUMBAR MEDIAL [address] L2-L5 performed by Rex Hashimoto, MD at [place] [address]  5/2012    severe spasms in the sigmoid colon , diverticulosis    COSMETIC SURGERY      cheek bone reconstruction     EPIDURAL STEROID INJECTION Left 9/25/2017    EPIDURAL STEROID INJECTION LEFT L4 L5  performed by Rex Hashimoto, MD at [facility]  06/21/2014    lumbar CARLEY     LUMBAR SPINE SURGERY  05/05/2014    lumbar CARLEY     NECK SURGERY      cadaverbone placement    NECK SURGERY  06/07/2014    cervical CARLEY     NERVE BLOCK Left 4/7/14    CERVIAL NECK    NERVE BLOCK N/A 2/29/2016    LUMBAR CARLEY    NERVE BLOCK N/A 11/07/2016    LUMBAR CARLEY    NERVE BLOCK Left 11/13/2017    lumbar CARLEY    NERVE BLOCK Left 01/29/2018    C3-5    NERVE BLOCK Right 02/12/2018    cervical medial brach    NERVE BLOCK Right 10/15/2018    Lumbar CARLEY    OTHER SURGICAL HISTORY  12/15/2014    lumbar steroid injection    OTHER SURGICAL HISTORY  7-20-15    left cervical steroid injection    OTHER SURGICAL HISTORY  01-25-16    Bilateral sacroiliac joint injection     OTHER SURGICAL HISTORY  09/25/2017    Lumbar Epidural Steroid injection    OTHER SURGICAL HISTORY Left 08/20/2018    cervical facet injection C2-3 and 4-5    OTHER SURGICAL HISTORY  12/03/2018    NERVE RADIOFREQUENCY ABLATION LEFT CERVICAL MEDIAL BRANCH C3/TON/C4/C5 (Left )    [place] INJ DX/THER AGNT PARAVERT FACET JOINT, CERV/THORAC, 1ST LEVEL Left 8/20/2018    LEFT CERVICAL FACET STEROID INJECTION C2/34 C3/4 C4/5 performed by Tam Al MD at [facility] DX/THER AGNT PARAVERT FACET JOINT, LUMBAR/SAC, 1ST LEVEL Bilateral 10/15/2018    NANCY LUMBAR FACET STEROID INJECTION performed by Tam Al MD at [address] Left 2/12/2018    NERVE BLOCK LEFT CERVICAL MEDIAL BRANCH C3/TON/C4/C5 performed by Tam Al MD at [address] OFFICE/OUTPT [facility] Left 12/3/2018    NERVE RADIOFREQUENCY ABLATION LEFT CERVICAL MEDIAL BRANCH C3/TON/C4/C5 performed by Tam Al MD at [facility] Left 3/4/2019    NERVE RADIOFREQUENCY ABLATION LEFT CERVICAL   MEDIAL BRANCH  C3/TON/C4/C5 performed by Tam Al MD at [address] [facility] ENDOSCOPY  5/2012    normal    UPPER GASTROINTESTINAL ENDOSCOPY  7 17 15    biopsy, pathology-mild chronic inflammation       Family History   Problem Relation Age of Onset    Diabetes Mother     Alzheimer's Disease Mother     High Blood Pressure Father     Diabetes Sister     High Blood Pressure Sister     High Blood Pressure Brother     Diabetes Maternal Grandmother           Social History     Tobacco Use    Smoking status: Current Every Day Smoker     Packs/day: 0.50     Years: 40.00     Pack years: 20.00     Types: Cigarettes    Smokeless tobacco: Former User    Tobacco comment: trying to quit smoking about 15 cigs a day - down to 8 a day- gradually decreasing   Substance Use Topics    Alcohol use:  Yes     Alcohol/week: 0.0 oz     Comment: rarely once a year         Current Outpatient Medications   Medication Sig Dispense Refill    aspirin 325 MG tablet Take 325 mg by mouth as needed for Pain      sucralfate (CARAFATE) 1 GM/10ML suspension Take 10 mLs by mouth 4 times daily 1200 mL 3    cetirizine (ZYRTEC) 10 MG tablet TAKE 1 TABLET BY MOUTH DAILY 90 tablet 1    ipratropium-albuterol (DUONEB) 0.5-2.5 (3) MG/3ML SOLN nebulizer solution INHALE 1 VIAL VIA NEBULIZER EVERY 6 HOURS AS NEEDED FOR SHORTNESS OF BREATH 90 mL 5    ranitidine (ZANTAC) 300 MG tablet TAKE 1 TABLET BY MOUTH NIGHTLY 90 tablet 1    rOPINIRole (REQUIP) 3 MG tablet TAKE 1 TABLET BY MOUTH NIGHTLY 90 tablet 1    RESTASIS 0.05 % ophthalmic emulsion INSTILL 1 DROP INTO BOTH EYES TWICE DAILY 60 each 5    Cholecalciferol (VITAMIN D3) 1000 units TABS TAKE 1 TABLET BY MOUTH DAILY 90 tablet 1    losartan (COZAAR) 100 MG tablet Take 1 tablet by mouth daily 90 tablet 1    NIFEdipine (PROCARDIA XL) 60 MG extended release tablet Take 1 tablet by mouth 2 times daily 180 tablet 1    gabapentin (NEURONTIN) 400 MG capsule TAKE 1 CAPSULE BY MOUTH THREE TIMES DAILY. 90 capsule 1    VENTOLIN  (90 Base) MCG/ACT inhaler INHALE 2 PUFFS INTO THE LUNGS EVERY 6 HOURS AS NEEDED FOR WHEEZING 18 g 5    glycopyrrolate-formoterol (BEVESPI) 9-4.8 MCG/ACT AERO Inhale 2 puffs into the lungs 2 times daily 1 Inhaler 5    ergocalciferol (DRISDOL) 20501 units capsule Take 1 capsule by mouth once a week 4 capsule 3    meloxicam (MOBIC) 15 MG tablet TAKE 1 TABLET BY MOUTH DAILY 30 tablet 5    buPROPion (WELLBUTRIN XL) 150 MG extended release tablet Take 150 mg by mouth daily Taking 400 mg daily (2 of 200s)      FLOVENT  MCG/ACT inhaler INHALE 2 PUFFS INTO THE LUNGS TWICE DAILY (Patient taking differently: INHALE 2 PUFFS INTO THE LUNGS TWICE DAILY PRN) 12 g 5    ALPRAZolam (XANAX) 1 MG tablet Take by mouth as needed .       DULoxetine (CYMBALTA) 30 MG extended release capsule Take 1 capsule by mouth daily 30 capsule 5    DULoxetine (CYMBALTA) 60 MG extended release capsule Take 1 capsule by mouth daily 30 capsule 5    mometasone (ELOCON) 0.1 % cream APPLY TOPICALLY DAILY 30 g 1    aspirin 81 MG tablet Take 81 mg by mouth daily      Nebulizers (COMPRESSOR/NEBULIZER) MISC Use with albuterol solution every 6 hours as needed. 1 each 0    [START ON 4/26/2019] HYDROcodone-acetaminophen (NORCO) 7.5-325 MG per tablet Take 1 tablet by mouth every 6 hours as needed for Pain (DO NOT FILL EBFORE 04/26/2019) for up to 30 days. 120 tablet 0     Current Facility-Administered Medications   Medication Dose Route Frequency Provider Last Rate Last Dose    triamcinolone acetonide (KENALOG-40) injection 40 mg  40 mg Intramuscular Once Ministerio Rodriguez APRN - CNP         No Known Allergies    Health Maintenance   Topic Date Due    Shingles Vaccine (1 of 2) 01/16/2006    Potassium monitoring  08/30/2019    Creatinine monitoring  08/30/2019    Diabetes screen  01/24/2020    Breast cancer screen  03/04/2021    Cervical cancer screen  10/06/2021    Colon cancer screen colonoscopy  05/16/2022    Lipid screen  08/30/2023    DTaP/Tdap/Td vaccine (2 - Td) 12/27/2027    Flu vaccine  Completed    Pneumococcal 0-64 years Vaccine  Completed    Hepatitis C screen  Completed    HIV screen  Completed          Review of Systems   Constitutional: Negative. HENT: Negative. Respiratory: Negative for cough. Cardiovascular: Negative. Negative for chest pain and palpitations. Gastrointestinal: Positive for abdominal pain. Endocrine: Positive for heat intolerance. Musculoskeletal: Positive for arthralgias, back pain, neck pain and neck stiffness. Skin: Negative. Neurological: Negative. Psychiatric/Behavioral: Positive for dysphoric mood. The patient is nervous/anxious.         Objective:     /80 (Site: Left Upper Arm, Position: Sitting, Cuff Size: Large Adult)   Pulse 105   Temp 98 °F (36.7 °C) (Oral)   Resp 18   Ht 5' 1.5\" (1.562 m)   Wt 159 lb (72.1 kg)   SpO2 98% Comment: room air at rest  BMI 29.56 kg/m²   Physical Exam   Constitutional: She is oriented to person, place, and time. She appears well-developed and well-nourished. HENT:   Head: Normocephalic. Eyes: Conjunctivae are normal.   Cardiovascular: Normal rate and regular rhythm. Pulmonary/Chest: Effort normal and breath sounds normal.   Neurological: She is alert and oriented to person, place, and time. Skin: Skin is warm and dry. Psychiatric: She has a normal mood and affect. Her behavior is normal. Judgment and thought content normal.         Assessment:      1. Anxiety    2. Hormone imbalance    3. Epigastric pain               Quality & Risk Score Accuracy    Last edited 04/22/19 17:34 EDT by TAMI Chatman CNP           Plan:      No orders of the defined types were placed in this encounter. 1. Anxiety  Continue to follow with psych provider    2. Hormone imbalance  Labs printed and sent with pt to have eval at [place] [address]    3. Epigastric pain    - sucralfate (CARAFATE) 1 GM/10ML suspension; Take 10 mLs by mouth 4 times daily  Dispense: 1200 mL; Refill: 3      No follow-ups on file. Patient given educational materials - see patientinstructions. Discussed use, benefit, and side effects of prescribed medications. All patient questions answered. Pt voiced understanding. Reviewed health maintenance. Instructed to continue current medications, diet and exercise. Patient agreedwith treatment plan. Follow up as directed.      Electronically signed by TAMI Chatman CNP on 4/22/2019

## 2019-04-22 NOTE — PROGRESS NOTES
Visit Information    Have you changed or started any medications since your last visit including any over-the-counter medicines, vitamins, or herbal medicines? no   Are you having any side effects from any of your medications? -  no  Have you stopped taking any of your medications? Is so, why? -  no    Have you seen any other physician or provider since your last visit? No  Have you had any other diagnostic tests since your last visit? No  Have you been seen in the emergency room and/or had an admission to a hospital since we last saw you? No  Have you had your routine dental cleaning in the past 6 months? no    Have you activated your [employer] account? If not, what are your barriers?  Yes     Patient Care Team:  TAMI Carrillo CNP as PCP - General (Nurse Practitioner)  TAMI Carrillo CNP as PCP - [facility] Attributed Provider  Marvin Marr MD as Consulting Physician (Pain Management)  Abiodun Jackosn MD as Surgeon (Cardiology)  Mine Estrada MD as Consulting Physician (Pulmonology)  TAMI Thomas CNP as Nurse Practitioner (Nurse Practitioner)  Araceli Alcaraz DO as Consulting Physician (General Surgery)  Eddi Murillo MD as Consulting Physician (Dermatology)    Medical History Review  Past Medical, Family, and Social History reviewed and does contribute to the patient presenting condition    Health Maintenance   Topic Date Due    Shingles Vaccine (1 of 2) 01/16/2006    Potassium monitoring  08/30/2019    Creatinine monitoring  08/30/2019    Diabetes screen  01/24/2020    Breast cancer screen  03/04/2021    Cervical cancer screen  10/06/2021    Colon cancer screen colonoscopy  05/16/2022    Lipid screen  08/30/2023    DTaP/Tdap/Td vaccine (2 - Td) 12/27/2027    Flu vaccine  Completed    Pneumococcal 0-64 years Vaccine  Completed    Hepatitis C screen  Completed    HIV screen  Completed

## 2019-05-01 DIAGNOSIS — J44.9 CHRONIC OBSTRUCTIVE PULMONARY DISEASE, UNSPECIFIED COPD TYPE (HCC): ICD-10-CM

## 2019-05-01 RX ORDER — GLYCOPYRROLATE AND FORMOTEROL FUMARATE 9; 4.8 UG/1; UG/1
AEROSOL, METERED RESPIRATORY (INHALATION)
Qty: 10.7 G | Refills: 3 | Status: SHIPPED | OUTPATIENT
Start: 2019-05-01 | End: 2019-08-14 | Stop reason: SDUPTHER

## 2019-05-01 NOTE — TELEPHONE ENCOUNTER
without gangrene     Lumbosacral spondylosis without myelopathy     Mixed hyperlipidemia     Chronic bilateral low back pain without sciatica     Hormone imbalance     Epigastric pain

## 2019-05-15 ENCOUNTER — OFFICE VISIT (OUTPATIENT)
Dept: FAMILY MEDICINE CLINIC | Age: 64
End: 2019-05-15
Payer: MEDICARE

## 2019-05-15 VITALS
HEART RATE: 94 BPM | BODY MASS INDEX: 29.08 KG/M2 | OXYGEN SATURATION: 96 % | SYSTOLIC BLOOD PRESSURE: 136 MMHG | DIASTOLIC BLOOD PRESSURE: 80 MMHG | WEIGHT: 156.4 LBS

## 2019-05-15 DIAGNOSIS — K57.30 DIVERTICULOSIS OF COLON: ICD-10-CM

## 2019-05-15 DIAGNOSIS — E55.9 VITAMIN D DEFICIENCY: ICD-10-CM

## 2019-05-15 DIAGNOSIS — K21.9 GASTROESOPHAGEAL REFLUX DISEASE, ESOPHAGITIS PRESENCE NOT SPECIFIED: Primary | ICD-10-CM

## 2019-05-15 PROCEDURE — G8417 CALC BMI ABV UP PARAM F/U: HCPCS | Performed by: NURSE PRACTITIONER

## 2019-05-15 PROCEDURE — 3017F COLORECTAL CA SCREEN DOC REV: CPT | Performed by: NURSE PRACTITIONER

## 2019-05-15 PROCEDURE — G8427 DOCREV CUR MEDS BY ELIG CLIN: HCPCS | Performed by: NURSE PRACTITIONER

## 2019-05-15 PROCEDURE — 4004F PT TOBACCO SCREEN RCVD TLK: CPT | Performed by: NURSE PRACTITIONER

## 2019-05-15 PROCEDURE — 99214 OFFICE O/P EST MOD 30 MIN: CPT | Performed by: NURSE PRACTITIONER

## 2019-05-15 PROCEDURE — G8598 ASA/ANTIPLAT THER USED: HCPCS | Performed by: NURSE PRACTITIONER

## 2019-05-15 RX ORDER — MELATONIN
1 DAILY
Qty: 90 TABLET | Refills: 1 | Status: SHIPPED | OUTPATIENT
Start: 2019-05-15 | End: 2019-08-14

## 2019-05-15 RX ORDER — OMEPRAZOLE 40 MG/1
40 CAPSULE, DELAYED RELEASE ORAL DAILY
Qty: 90 CAPSULE | Refills: 1 | Status: SHIPPED | OUTPATIENT
Start: 2019-05-15

## 2019-05-15 RX ORDER — RANITIDINE 300 MG/1
300 TABLET ORAL NIGHTLY
Qty: 90 TABLET | Refills: 1 | Status: SHIPPED | OUTPATIENT
Start: 2019-05-15 | End: 2020-02-24

## 2019-05-15 RX ORDER — MOMETASONE FUROATE 1 MG/G
CREAM TOPICAL
Qty: 30 G | Refills: 1 | Status: SHIPPED | OUTPATIENT
Start: 2019-05-15 | End: 2019-07-16 | Stop reason: SDUPTHER

## 2019-05-15 ASSESSMENT — ENCOUNTER SYMPTOMS
BACK PAIN: 1
ABDOMINAL PAIN: 1
COUGH: 1

## 2019-05-15 NOTE — PROGRESS NOTES
[address] [facility] [address]  Dept: [phone]  Dept Fax: [phone]      Perry Suárez is a 62 y.o. female who presents today for hermedical conditions/complaints as noted below. Perry Suárez is c/o of Medication Refill            HPI:      PAM Mcnulty is here today for medication refills. She is still having a lot of stress in her life. She had to remove her brother from her home, her sister has dementia and is in [facility]. She cannot get to her to help. She is having GERD symptoms daily. She is using ranitidine at . She was on prilosec previous and she was having less symptoms.    Hemoglobin A1C (%)   Date Value   12/12/2012 5.2   12/16/2011 5.4   09/22/2011 5.2             ( goal A1Cis < 7)   No results found for: LABMICR  LDL Cholesterol (mg/dL)   Date Value   01/24/2017 159 (H)   03/20/2014 210 (H)   12/12/2012 139 (H)     LDL Calculated (mg/dL)   Date Value   08/30/2018 145       (goal LDL is <100)   AST (U/L)   Date Value   08/30/2018 26     ALT (U/L)   Date Value   08/30/2018 26     BUN (mg/dL)   Date Value   08/30/2018 12     BP Readings from Last 3 Encounters:   05/15/19 136/80   04/22/19 132/80   04/22/19 136/74          (goal 120/80)    Past Medical History:   Diagnosis Date    Anxiety     CAD (coronary artery disease)     Carotid artery stenosis     Cataract     COPD (chronic obstructive pulmonary disease) (HCC)     Depression     Esophageal reflux     Fibromyalgia     Head injury     Headache(784.0)     Hearing loss     Heart attack ([address]     Hyperlipidemia     Hypertension     Insomnia     Migraine     Osteoarthritis     Pneumonia     Reflux     TIA (transient ischemic attack)     Ulcerative colitis ([address]     Unspecified cerebral artery occlusion with cerebral infarction       Past Surgical History:   Procedure Laterality Date    ANESTHESIA NERVE BLOCK Left 11/13/2017    lumbar epidural steroid injection performed by Bessy Bhakta MD at [facility] Left 1/29/2018    NERVE BLOCK LEFT CERVICAL MEDIAL BRANCH C3 TON C4 C5 performed by Bessy Bhakta MD at [facility] Bilateral 4/15/2019    BILATERAL LUMBAR MEDIAL BRANCH NERVE BLOCK L2-L5 performed by Bessy Bhakta MD at [facility] Bilateral 4/22/2019    BILATERAL LUMBAR MEDIAL BRANCH NERVE BLOCK L2-L5 performed by Bessy Bhakta MD at [place] [address]  5/2012    severe spasms in the sigmoid colon , diverticulosis    COSMETIC SURGERY      cheek bone reconstruction     EPIDURAL STEROID INJECTION Left 9/25/2017    EPIDURAL STEROID INJECTION LEFT L4 L5  performed by Bessy [facility]ta MD at [facility]  06/21/2014    lumbar CARLEY     LUMBAR SPINE SURGERY  05/05/2014    lumbar CARLYE     NECK SURGERY      cadaverbone placement    NECK SURGERY  06/07/2014    cervical CARLEY     NERVE BLOCK Left 4/7/14    CERVIAL NECK    NERVE BLOCK N/A 2/29/2016    LUMBAR CARLEY    NERVE BLOCK N/A 11/07/2016    LUMBAR CARLEY    NERVE BLOCK Left 11/13/2017    lumbar CARLEY    NERVE BLOCK Left 01/29/2018    C3-5    NERVE BLOCK Right 02/12/2018    cervical medial brach    NERVE BLOCK Right 10/15/2018    Lumbar CARLEY    OTHER SURGICAL HISTORY  12/15/2014    lumbar steroid injection    OTHER SURGICAL HISTORY  7-20-15    left cervical steroid injection    OTHER SURGICAL HISTORY  01-25-16    Bilateral sacroiliac joint injection     OTHER SURGICAL HISTORY  09/25/2017    Lumbar Epidural Steroid injection    OTHER SURGICAL HISTORY Left 08/20/2018    cervical facet injection C2-3 and 4-5    OTHER SURGICAL HISTORY  12/03/2018    NERVE RADIOFREQUENCY ABLATION LEFT CERVICAL MEDIAL BRANCH C3/TON/C4/C5 (Left )    [place] INJ DX/THER AGNT PARAVERT FACET JOINT, CERV/THORAC, 1ST LEVEL Left 8/20/2018    LEFT CERVICAL FACET STEROID INJECTION C2/34 C3/4 C4/5 performed by Les Appl Charline Prieto MD at [facility] DX/THER AGNT PARAVERT FACET JOINT, LUMBAR/SAC, 1ST LEVEL Bilateral 10/15/2018    NANCY LUMBAR FACET STEROID INJECTION performed by Magaly Everett MD at [address] Left 2/12/2018    NERVE BLOCK LEFT CERVICAL MEDIAL BRANCH C3/TON/C4/C5 performed by Magaly Everett MD at [address] OFFICE/OUTPT [address] [facility] [address] Left 12/3/2018    NERVE RADIOFREQUENCY ABLATION LEFT CERVICAL MEDIAL BRANCH C3/TON/C4/C5 performed by Magaly Everett MD at [facility] Left 3/4/2019    NERVE RADIOFREQUENCY ABLATION LEFT CERVICAL   MEDIAL BRANCH  C3/TON/C4/C5 performed by Magaly Everett MD at [address] [facility] ENDOSCOPY  5/2012    normal    UPPER GASTROINTESTINAL ENDOSCOPY  7 17 15    biopsy, pathology-mild chronic inflammation       Family History   Problem Relation Age of Onset    Diabetes Mother     Alzheimer's Disease Mother     High Blood Pressure Father     Diabetes Sister     High Blood Pressure Sister     High Blood Pressure Brother     Diabetes Maternal Grandmother           Social History     Tobacco Use    Smoking status: Current Every Day Smoker     Packs/day: 0.50     Years: 40.00     Pack years: 20.00     Types: Cigarettes    Smokeless tobacco: Former User    Tobacco comment: trying to quit smoking about 15 cigs a day - down to 8 a day- gradually decreasing   Substance Use Topics    Alcohol use:  Yes     Alcohol/week: 0.0 oz     Comment: rarely once a year         Current Outpatient Medications   Medication Sig Dispense Refill    mometasone (ELOCON) 0.1 % cream APPLY TOPICALLY DAILY 30 g 1    Cholecalciferol (VITAMIN D3) 1000 units TABS Take 1 tablet by mouth daily 90 tablet 1    ranitidine (ZANTAC) 300 MG tablet Take 1 tablet by mouth nightly 90 tablet 1    omeprazole (PRILOSEC) 40 MG delayed release capsule Take 1 capsule by mouth daily 90 capsule 1    BEVESPI AEROSPHERE 9-4.8 MCG/ACT AERO INHALE 2 PUFFS INTO THE LUNGS TWICE DAILY 10.7 g 3    aspirin 325 MG tablet Take 325 mg by mouth as needed for Pain      HYDROcodone-acetaminophen (NORCO) 7.5-325 MG per tablet Take 1 tablet by mouth every 6 hours as needed for Pain (DO NOT FILL EBFORE 04/26/2019) for up to 30 days. 120 tablet 0    sucralfate (CARAFATE) 1 GM/10ML suspension Take 10 mLs by mouth 4 times daily 1200 mL 3    cetirizine (ZYRTEC) 10 MG tablet TAKE 1 TABLET BY MOUTH DAILY 90 tablet 1    ipratropium-albuterol (DUONEB) 0.5-2.5 (3) MG/3ML SOLN nebulizer solution INHALE 1 VIAL VIA NEBULIZER EVERY 6 HOURS AS NEEDED FOR SHORTNESS OF BREATH 90 mL 5    rOPINIRole (REQUIP) 3 MG tablet TAKE 1 TABLET BY MOUTH NIGHTLY 90 tablet 1    RESTASIS 0.05 % ophthalmic emulsion INSTILL 1 DROP INTO BOTH EYES TWICE DAILY 60 each 5    losartan (COZAAR) 100 MG tablet Take 1 tablet by mouth daily 90 tablet 1    NIFEdipine (PROCARDIA XL) 60 MG extended release tablet Take 1 tablet by mouth 2 times daily 180 tablet 1    gabapentin (NEURONTIN) 400 MG capsule TAKE 1 CAPSULE BY MOUTH THREE TIMES DAILY. 90 capsule 1    meloxicam (MOBIC) 15 MG tablet TAKE 1 TABLET BY MOUTH DAILY 30 tablet 5    buPROPion (WELLBUTRIN XL) 150 MG extended release tablet Take 150 mg by mouth daily Taking 400 mg daily (2 of 200s)      FLOVENT  MCG/ACT inhaler INHALE 2 PUFFS INTO THE LUNGS TWICE DAILY (Patient taking differently: INHALE 2 PUFFS INTO THE LUNGS TWICE DAILY PRN) 12 g 5    ALPRAZolam (XANAX) 1 MG tablet Take by mouth as needed .  DULoxetine (CYMBALTA) 30 MG extended release capsule Take 1 capsule by mouth daily 30 capsule 5    DULoxetine (CYMBALTA) 60 MG extended release capsule Take 1 capsule by mouth daily 30 capsule 5    aspirin 81 MG tablet Take 81 mg by mouth daily      Nebulizers (COMPRESSOR/NEBULIZER) MISC Use with albuterol solution every 6 hours as needed.  1 each 0    VENTOLIN  (90 Base) MCG/ACT inhaler INHALE 2 PUFFS

## 2019-05-23 ENCOUNTER — OFFICE VISIT (OUTPATIENT)
Dept: PAIN MANAGEMENT | Age: 64
End: 2019-05-23
Payer: MEDICARE

## 2019-05-23 VITALS
BODY MASS INDEX: 28.16 KG/M2 | DIASTOLIC BLOOD PRESSURE: 73 MMHG | HEART RATE: 95 BPM | HEIGHT: 62 IN | WEIGHT: 153 LBS | OXYGEN SATURATION: 98 % | SYSTOLIC BLOOD PRESSURE: 117 MMHG

## 2019-05-23 DIAGNOSIS — Z98.1 HX OF FUSION OF CERVICAL SPINE: ICD-10-CM

## 2019-05-23 DIAGNOSIS — Z79.899 CHRONIC PRESCRIPTION BENZODIAZEPINE USE: ICD-10-CM

## 2019-05-23 DIAGNOSIS — Z79.891 CHRONIC USE OF OPIATE DRUGS THERAPEUTIC PURPOSES: ICD-10-CM

## 2019-05-23 DIAGNOSIS — M47.817 LUMBOSACRAL SPONDYLOSIS WITHOUT MYELOPATHY: Primary | ICD-10-CM

## 2019-05-23 DIAGNOSIS — M54.50 CHRONIC BILATERAL LOW BACK PAIN WITHOUT SCIATICA: Chronic | ICD-10-CM

## 2019-05-23 DIAGNOSIS — M47.22 CERVICAL SPONDYLOSIS WITH RADICULOPATHY: Chronic | ICD-10-CM

## 2019-05-23 DIAGNOSIS — G89.29 CHRONIC BILATERAL LOW BACK PAIN WITHOUT SCIATICA: Chronic | ICD-10-CM

## 2019-05-23 PROCEDURE — 99214 OFFICE O/P EST MOD 30 MIN: CPT | Performed by: ANESTHESIOLOGY

## 2019-05-23 PROCEDURE — G8598 ASA/ANTIPLAT THER USED: HCPCS | Performed by: ANESTHESIOLOGY

## 2019-05-23 PROCEDURE — G8417 CALC BMI ABV UP PARAM F/U: HCPCS | Performed by: ANESTHESIOLOGY

## 2019-05-23 PROCEDURE — 4004F PT TOBACCO SCREEN RCVD TLK: CPT | Performed by: ANESTHESIOLOGY

## 2019-05-23 PROCEDURE — G8427 DOCREV CUR MEDS BY ELIG CLIN: HCPCS | Performed by: ANESTHESIOLOGY

## 2019-05-23 PROCEDURE — 3017F COLORECTAL CA SCREEN DOC REV: CPT | Performed by: ANESTHESIOLOGY

## 2019-05-23 RX ORDER — MELOXICAM 15 MG/1
TABLET ORAL
Qty: 30 TABLET | Refills: 1 | Status: SHIPPED | OUTPATIENT
Start: 2019-05-23 | End: 2019-07-16 | Stop reason: SDUPTHER

## 2019-05-23 RX ORDER — DOCUSATE SODIUM 100 MG/1
100 CAPSULE, LIQUID FILLED ORAL 2 TIMES DAILY
Qty: 60 CAPSULE | Refills: 0 | Status: SHIPPED | OUTPATIENT
Start: 2019-05-23 | End: 2019-06-22

## 2019-05-23 RX ORDER — HYDROCODONE BITARTRATE AND ACETAMINOPHEN 7.5; 325 MG/1; MG/1
1 TABLET ORAL EVERY 6 HOURS PRN
Qty: 120 TABLET | Refills: 0 | Status: SHIPPED | OUTPATIENT
Start: 2019-05-26 | End: 2019-06-25 | Stop reason: SDUPTHER

## 2019-05-23 RX ORDER — GABAPENTIN 400 MG/1
CAPSULE ORAL
Qty: 90 CAPSULE | Refills: 1 | Status: SHIPPED | OUTPATIENT
Start: 2019-05-23 | End: 2019-07-16 | Stop reason: SDUPTHER

## 2019-05-23 ASSESSMENT — ENCOUNTER SYMPTOMS
BACK PAIN: 1
RESPIRATORY NEGATIVE: 1

## 2019-05-23 NOTE — PROGRESS NOTES
The patient is a 62 y. o. Non-/non  female. Chief Complaint   Patient presents with    Back Pain    Neck Pain    Medication Refill    Follow Up After Procedure        HPI     Back pain  Pain is chronic onset many years ago located in the lumbar area across midline affecting both sides with extension over the hip and gluteal region. No further radiation in leg. No associated dermatomal numbness or paresthesia  Patient failed conservative measures  She had 2 diagnostic lumbar medial branch nerve block  Today here for a postprocedure follow-up  Reports 75% improvement after the pain lasting for more than one day  Pain then gradually returned back to the baseline  Currently rates her back pain is 6 out of 10  S/P:Lumbar Medial Branch nerve Blocks at the transverse processes of L3, L4, L5 and sacral  ala under fluoroscopy guidance 4/15/2019, 4/22/2019    Outcome   Any improvement of activity?   Yes   Any side effects (appetite,leg cramping,facial fleshing):none   Increase of pain:  Yes  Pain score Today:  3  % of pain relief: 75%     Pain score Today:  3-6  Adverse effects (Constipation / Nausea / Sedation / sexual Dysfunction / others) :  none  Mood: good  Sleep pattern and quality: good  Activity level: good    - Neck pain  History of chronic neck pain located in the cervical spine area  Was diagnosed with cervical facet mediated pain  Had cervical did a frequency ablation  Significant improvement in neck pain  Neck pain is currently well controlled      - Medication management  Currently takes Norco 7.5, 4 times a day  Denies any side effects  Feels that the pain do not gets adequately controlled with this medication  Requesting increasing dose   Pill count Today: #10  Last dose taken  5/23/2019  OARRS report reviewed today: yes  ER/Hospitalizations/PCP visit related to pain since last visit:no   Any legal problems e.g. DUI etc.:No  Satisfied with current management: Yes    Opioid Contract 2/22/2019  Last Urine Dug screen dated: 02/22/20109      Past Medical History, Past Surgical History, Social History, Allergies and Medications, reviewed and updated in EPIC as indicated    Past Medical History:   Diagnosis Date    Anxiety     CAD (coronary artery disease)     Carotid artery stenosis     Cataract     [facility]     Depression     Esophageal reflux     Fibromyalgia     Head injury     Headache(784.0)     Hearing loss     Heart attack ([facility]     Hyperlipidemia     Hypertension     Insomnia     Migraine     Osteoarthritis     Pneumonia     Reflux     TIA (transient ischemic attack)     Ulcerative colitis ([facility]     Unspecified cerebral artery occlusion with cerebral infarction       Past Surgical History:   Procedure Laterality Date    ANESTHESIA NERVE BLOCK Left 11/13/2017    lumbar epidural steroid injection  performed by Millicent Diaz MD at [facility] Left 1/29/2018    NERVE BLOCK LEFT CERVICAL MEDIAL BRANCH C3 TON C4 C5 performed by Millicent Diaz MD at [facility] Bilateral 4/15/2019    BILATERAL LUMBAR MEDIAL BRANCH NERVE BLOCK L2-L5 performed by Millicent Diaz MD at [facility] Bilateral 4/22/2019    BILATERAL LUMBAR MEDIAL BRANCH NERVE BLOCK L2-L5 performed by Millicent Diaz MD at [place] [address]  5/2012    severe spasms in the sigmoid colon , diverticulosis    COSMETIC SURGERY      cheek bone reconstruction     EPIDURAL STEROID INJECTION Left 9/25/2017    EPIDURAL STEROID INJECTION LEFT L4 L5  performed by [facility]ent [facility] at [facility]  06/21/2014    lumbar CARLEY     LUMBAR SPINE SURGERY  05/05/2014    lumbar CARLEY     NECK SURGERY      cadaverbone placement    NECK SURGERY  06/07/2014    cervical CARLEY     NERVE BLOCK Left 4/7/14    CERVIAL NECK    NERVE BLOCK N/A 2/29/2016    LUMBAR CARLEY    NERVE BLOCK N/A 11/07/2016    LUMBAR CARLEY    NERVE BLOCK Left 11/13/2017    lumbar CARLEY    NERVE BLOCK Left 01/29/2018    C3-5    NERVE BLOCK Right 02/12/2018    cervical medial brach    NERVE BLOCK Right 10/15/2018    Lumbar CARLEY    OTHER SURGICAL HISTORY  12/15/2014    lumbar steroid injection    OTHER SURGICAL HISTORY  7-20-15    left cervical steroid injection    OTHER SURGICAL HISTORY  01-25-16    Bilateral sacroiliac joint injection     OTHER SURGICAL HISTORY  09/25/2017    Lumbar Epidural Steroid injection    OTHER SURGICAL HISTORY Left 08/20/2018    cervical facet injection C2-3 and 4-5    OTHER SURGICAL HISTORY  12/03/2018    NERVE RADIOFREQUENCY ABLATION LEFT CERVICAL MEDIAL BRANCH C3/TON/C4/C5 (Left )    FL INJ DX/THER AGNT PARAVERT FACET JOINT, CERV/THORAC, 1ST LEVEL Left 8/20/2018    LEFT CERVICAL FACET STEROID INJECTION C2/34 C3/4 C4/5 performed by Gilles Lama MD at [facility] DX/THER AGNT PARAVERT FACET JOINT, LUMBAR/SAC, 1ST LEVEL Bilateral 10/15/2018    NANCY LUMBAR FACET STEROID INJECTION performed by Gilles aLma MD at [address] Left 2/12/2018    NERVE BLOCK LEFT CERVICAL MEDIAL BRANCH C3/TON/C4/C5 performed by Gilles Lama MD at [address] [facility] OFFICE/OUTPT [address] [facility] Left 12/3/2018    NERVE RADIOFREQUENCY ABLATION LEFT CERVICAL MEDIAL BRANCH C3/TON/C4/C5 performed by Gilles Lama MD at [address] Left 3/4/2019    NERVE RADIOFREQUENCY ABLATION LEFT CERVICAL   MEDIAL BRANCH  C3/TON/C4/C5 performed by Gilles Lama MD at [address] [facility] ENDOSCOPY  5/2012    normal    UPPER GASTROINTESTINAL ENDOSCOPY  7 17 15    biopsy, pathology-mild chronic inflammation     Social History     Socioeconomic History    Marital status:      Spouse name: None    Number of children: None    Years of education: None    Highest education level: None Occupational History    None   Social Needs    Financial resource strain: None    Food insecurity:     Worry: None     Inability: None    Transportation needs:     Medical: None     Non-medical: None   Tobacco Use    Smoking status: Current Every Day Smoker     Packs/day: 0.50     Years: 40.00     Pack years: 20.00     Types: Cigarettes    Smokeless tobacco: Former User    Tobacco comment: trying to quit smoking about 15 cigs a day - down to 8 a day- gradually decreasing   Substance and Sexual Activity    Alcohol use: Yes     Alcohol/week: 0.0 oz     Comment: rarely once a year    Drug use: No    Sexual activity: None   Lifestyle    Physical activity:     Days per week: None     Minutes per session: None    Stress: None   Relationships    Social connections:     Talks on phone: None     Gets together: None     Attends Uats[employer] service: None     Active member of club or organization: None     Attends meetings of clubs or organizations: None     Relationship status: None    Intimate partner violence:     Fear of current or ex partner: None     Emotionally abused: None     Physically abused: None     Forced sexual activity: None   Other Topics Concern    None   Social History Narrative    None     Family History   Problem Relation Age of Onset    Diabetes Mother     Alzheimer's Disease Mother     High Blood Pressure Father     Diabetes Sister     High Blood Pressure Sister     High Blood Pressure Brother     Diabetes Maternal Grandmother      No Known Allergies  Patient has no known allergies. Vitals:    05/23/19 1433   BP: 117/73   Pulse: 95   SpO2: 98%     Current Outpatient Medications   Medication Sig Dispense Refill    [START ON 5/26/2019] HYDROcodone-acetaminophen (NORCO) 7.5-325 MG per tablet Take 1 tablet by mouth every 6 hours as needed for Pain (DO NOT FILL EBFORE 05/25/2019) for up to 30 days.  120 tablet 0    gabapentin (NEURONTIN) 400 MG capsule TAKE 1 CAPSULE BY MOUTH THREE TIMES DAILY 90 capsule 1    docusate sodium (COLACE) 100 MG capsule Take 1 capsule by mouth 2 times daily 60 capsule 0    meloxicam (MOBIC) 15 MG tablet TAKE 1 TABLET BY MOUTH DAILY 30 tablet 1    mometasone (ELOCON) 0.1 % cream APPLY TOPICALLY DAILY 30 g 1    Cholecalciferol (VITAMIN D3) 1000 units TABS Take 1 tablet by mouth daily 90 tablet 1    ranitidine (ZANTAC) 300 MG tablet Take 1 tablet by mouth nightly 90 tablet 1    omeprazole (PRILOSEC) 40 MG delayed release capsule Take 1 capsule by mouth daily 90 capsule 1    BEVESPI AEROSPHERE 9-4.8 MCG/ACT AERO INHALE 2 PUFFS INTO THE LUNGS TWICE DAILY 10.7 g 3    aspirin 325 MG tablet Take 325 mg by mouth as needed for Pain      sucralfate (CARAFATE) 1 GM/10ML suspension Take 10 mLs by mouth 4 times daily 1200 mL 3    cetirizine (ZYRTEC) 10 MG tablet TAKE 1 TABLET BY MOUTH DAILY 90 tablet 1    ipratropium-albuterol (DUONEB) 0.5-2.5 (3) MG/3ML SOLN nebulizer solution INHALE 1 VIAL VIA NEBULIZER EVERY 6 HOURS AS NEEDED FOR SHORTNESS OF BREATH 90 mL 5    rOPINIRole (REQUIP) 3 MG tablet TAKE 1 TABLET BY MOUTH NIGHTLY 90 tablet 1    RESTASIS 0.05 % ophthalmic emulsion INSTILL 1 DROP INTO BOTH EYES TWICE DAILY 60 each 5    losartan (COZAAR) 100 MG tablet Take 1 tablet by mouth daily 90 tablet 1    NIFEdipine (PROCARDIA XL) 60 MG extended release tablet Take 1 tablet by mouth 2 times daily 180 tablet 1    VENTOLIN  (90 Base) MCG/ACT inhaler INHALE 2 PUFFS INTO THE LUNGS EVERY 6 HOURS AS NEEDED FOR WHEEZING 18 g 5    buPROPion (WELLBUTRIN XL) 150 MG extended release tablet Take 150 mg by mouth daily Taking 400 mg daily (2 of 200s)      FLOVENT  MCG/ACT inhaler INHALE 2 PUFFS INTO THE LUNGS TWICE DAILY (Patient taking differently: INHALE 2 PUFFS INTO THE LUNGS TWICE DAILY PRN) 12 g 5    ALPRAZolam (XANAX) 1 MG tablet Take by mouth as needed .       DULoxetine (CYMBALTA) 30 MG extended release capsule Take 1 capsule by mouth daily 30 capsule 5  DULoxetine (CYMBALTA) 60 MG extended release capsule Take 1 capsule by mouth daily 30 capsule 5    aspirin 81 MG tablet Take 81 mg by mouth daily      Nebulizers (COMPRESSOR/NEBULIZER) MISC Use with albuterol solution every 6 hours as needed. 1 each 0     Current Facility-Administered Medications   Medication Dose Route Frequency Provider Last Rate Last Dose    triamcinolone acetonide (KENALOG-40) injection 40 mg  40 mg Intramuscular Once Keerthi Butler, APRN - CNP         Review of Systems   Constitutional: Negative. Negative for fever. Respiratory: Negative. Musculoskeletal: Positive for back pain, neck pain and neck stiffness. Negative for arthralgias, gait problem, joint swelling and myalgias. Neurological: Positive for weakness and numbness. Negative for dizziness, tremors, seizures, syncope, facial asymmetry, speech difficulty, light-headedness and headaches. Objective:  General Appearance:  Comfortable, in no acute distress, in pain and well-appearing. Vital signs: (most recent): Blood pressure 117/73, pulse 95, height 5' 2\" (1.575 m), weight 153 lb (69.4 kg), SpO2 98 %, not currently breastfeeding. Vital signs are normal.  No fever. HEENT: Normal HEENT exam.    Lungs:  Normal effort. Breath sounds clear to auscultation. Heart: Normal rate. Abdomen: Abdomen is soft. Extremities: Normal range of motion. Neurological: Patient is alert and oriented to person, place and time. Normal strength. Patient has normal reflexes, normal muscle tone and normal coordination. Pupils:  Pupils are equal, round, and reactive to light. Pupils are equal.   Skin:  Warm, dry and pale. No rash, ecchymosis, cyanosis or ulceration. Assessment & Plan  1. Cervical spondylosis with radiculopathy    2. Spondylosis of cervical region without myelopathy or radiculopathy    3. Chronic use of opiate drugs therapeutic purposes    4. Hx of fusion of cervical spine    5.  Chronic Routine Monitoring Possible medication side effects, risk of tolerance/dependence & alternative treatments discussed. ;No signs of potential drug abuse or diversion identified: otherwise, see note documentation   Chronic Pain > 50 MEDD Obtained or confirmened \"Consent of Opioid Use\" on file.    Chronic Pain > 80 MEDD -         Electronically signed by Marvin Marr MD on 5/23/2019 at 3:02 PM

## 2019-05-30 ENCOUNTER — APPOINTMENT (OUTPATIENT)
Dept: CT IMAGING | Age: 64
End: 2019-05-30
Payer: MEDICARE

## 2019-05-30 ENCOUNTER — HOSPITAL ENCOUNTER (EMERGENCY)
Age: 64
Discharge: HOME | End: 2019-05-30
Attending: EMERGENCY MEDICINE
Payer: MEDICARE

## 2019-05-30 VITALS
SYSTOLIC BLOOD PRESSURE: 141 MMHG | BODY MASS INDEX: 28.71 KG/M2 | WEIGHT: 156 LBS | OXYGEN SATURATION: 99 % | HEART RATE: 87 BPM | HEIGHT: 62 IN | TEMPERATURE: 98.1 F | DIASTOLIC BLOOD PRESSURE: 74 MMHG | RESPIRATION RATE: 18 BRPM

## 2019-05-30 DIAGNOSIS — R10.84 GENERALIZED ABDOMINAL PAIN: Primary | ICD-10-CM

## 2019-05-30 LAB
ABSOLUTE EOS #: 0.06 K/UL (ref 0–0.4)
ABSOLUTE IMMATURE GRANULOCYTE: 0.06 K/UL (ref 0–0.3)
ABSOLUTE LYMPH #: 2.12 K/UL (ref 1–4.8)
ABSOLUTE MONO #: 0.65 K/UL (ref 0.2–0.8)
ALBUMIN SERPL-MCNC: 4.2 G/DL (ref 3.5–5.2)
ALBUMIN/GLOBULIN RATIO: ABNORMAL (ref 1–2.5)
ALP BLD-CCNC: 82 U/L (ref 35–104)
ALT SERPL-CCNC: 22 U/L (ref 5–33)
ANION GAP SERPL CALCULATED.3IONS-SCNC: 12 MMOL/L (ref 9–17)
AST SERPL-CCNC: 15 U/L
BASOPHILS # BLD: 1 %
BASOPHILS ABSOLUTE: 0.06 K/UL (ref 0–0.2)
BILIRUB SERPL-MCNC: 0.15 MG/DL (ref 0.3–1.2)
BILIRUBIN DIRECT: <0.08 MG/DL
BILIRUBIN, INDIRECT: ABNORMAL MG/DL (ref 0–1)
BUN BLDV-MCNC: 15 MG/DL (ref 8–23)
BUN/CREAT BLD: 22 (ref 9–20)
CALCIUM SERPL-MCNC: 9.4 MG/DL (ref 8.6–10.4)
CHLORIDE BLD-SCNC: 98 MMOL/L (ref 98–107)
CO2: 26 MMOL/L (ref 20–31)
CREAT SERPL-MCNC: 0.69 MG/DL (ref 0.5–0.9)
DIFFERENTIAL TYPE: ABNORMAL
EOSINOPHILS RELATIVE PERCENT: 1 % (ref 1–4)
GFR AFRICAN AMERICAN: >60 ML/MIN
GFR NON-AFRICAN AMERICAN: >60 ML/MIN
GFR SERPL CREATININE-BSD FRML MDRD: ABNORMAL ML/MIN/{1.73_M2}
GFR SERPL CREATININE-BSD FRML MDRD: ABNORMAL ML/MIN/{1.73_M2}
GLOBULIN: ABNORMAL G/DL (ref 1.5–3.8)
GLUCOSE BLD-MCNC: 105 MG/DL (ref 70–99)
HCT VFR BLD CALC: 41.3 % (ref 36.3–47.1)
HEMOGLOBIN: 13.1 G/DL (ref 11.9–15.1)
IMMATURE GRANULOCYTES: 1 %
LIPASE: 25 U/L (ref 13–60)
LYMPHOCYTES # BLD: 36 % (ref 24–44)
MCH RBC QN AUTO: 28.1 PG (ref 25.2–33.5)
MCHC RBC AUTO-ENTMCNC: 31.7 G/DL (ref 28.4–34.8)
MCV RBC AUTO: 88.4 FL (ref 82.6–102.9)
MONOCYTES # BLD: 11 % (ref 1–7)
NRBC AUTOMATED: 0 PER 100 WBC
PDW BLD-RTO: 13.1 % (ref 11.8–14.4)
PLATELET # BLD: 298 K/UL (ref 138–453)
PLATELET ESTIMATE: ABNORMAL
PMV BLD AUTO: 9.2 FL (ref 8.1–13.5)
POTASSIUM SERPL-SCNC: 4 MMOL/L (ref 3.7–5.3)
RBC # BLD: 4.67 M/UL (ref 3.95–5.11)
RBC # BLD: ABNORMAL 10*6/UL
SEG NEUTROPHILS: 50 % (ref 36–66)
SEGMENTED NEUTROPHILS ABSOLUTE COUNT: 2.95 K/UL (ref 1.8–7.7)
SODIUM BLD-SCNC: 136 MMOL/L (ref 135–144)
TOTAL PROTEIN: 7.3 G/DL (ref 6.4–8.3)
WBC # BLD: 5.9 K/UL (ref 3.5–11.3)
WBC # BLD: ABNORMAL 10*3/UL

## 2019-05-30 PROCEDURE — 83690 ASSAY OF LIPASE: CPT

## 2019-05-30 PROCEDURE — 6360000004 HC RX CONTRAST MEDICATION: Performed by: EMERGENCY MEDICINE

## 2019-05-30 PROCEDURE — 74177 CT ABD & PELVIS W/CONTRAST: CPT

## 2019-05-30 PROCEDURE — 80048 BASIC METABOLIC PNL TOTAL CA: CPT

## 2019-05-30 PROCEDURE — 2580000003 HC RX 258: Performed by: EMERGENCY MEDICINE

## 2019-05-30 PROCEDURE — 99284 EMERGENCY DEPT VISIT MOD MDM: CPT

## 2019-05-30 PROCEDURE — 85025 COMPLETE CBC W/AUTO DIFF WBC: CPT

## 2019-05-30 PROCEDURE — 80076 HEPATIC FUNCTION PANEL: CPT

## 2019-05-30 RX ORDER — 0.9 % SODIUM CHLORIDE 0.9 %
80 INTRAVENOUS SOLUTION INTRAVENOUS ONCE
Status: COMPLETED | OUTPATIENT
Start: 2019-05-30 | End: 2019-05-30

## 2019-05-30 RX ORDER — DICYCLOMINE HYDROCHLORIDE 10 MG/1
10 CAPSULE ORAL 3 TIMES DAILY PRN
Qty: 20 CAPSULE | Refills: 0 | Status: SHIPPED | OUTPATIENT
Start: 2019-05-30 | End: 2020-02-24

## 2019-05-30 RX ORDER — FLUOXETINE 10 MG/1
20 TABLET, FILM COATED ORAL
Status: ON HOLD | COMMUNITY
End: 2020-02-26

## 2019-05-30 RX ORDER — SODIUM CHLORIDE 0.9 % (FLUSH) 0.9 %
10 SYRINGE (ML) INJECTION PRN
Status: DISCONTINUED | OUTPATIENT
Start: 2019-05-30 | End: 2019-05-30 | Stop reason: HOSPADM

## 2019-05-30 RX ORDER — SODIUM CHLORIDE 9 MG/ML
INJECTION, SOLUTION INTRAVENOUS CONTINUOUS
Status: DISCONTINUED | OUTPATIENT
Start: 2019-05-30 | End: 2019-05-30 | Stop reason: HOSPADM

## 2019-05-30 RX ADMIN — SODIUM CHLORIDE, PRESERVATIVE FREE 10 ML: 5 INJECTION INTRAVENOUS at 13:15

## 2019-05-30 RX ADMIN — SODIUM CHLORIDE: 9 INJECTION, SOLUTION INTRAVENOUS at 12:27

## 2019-05-30 RX ADMIN — IOPAMIDOL 75 ML: 755 INJECTION, SOLUTION INTRAVENOUS at 13:15

## 2019-05-30 RX ADMIN — SODIUM CHLORIDE 80 ML: 0.9 INJECTION, SOLUTION INTRAVENOUS at 13:14

## 2019-05-30 ASSESSMENT — PAIN DESCRIPTION - LOCATION: LOCATION: ABDOMEN

## 2019-05-30 ASSESSMENT — PAIN DESCRIPTION - ORIENTATION: ORIENTATION: MID;UPPER

## 2019-05-30 ASSESSMENT — PAIN SCALES - GENERAL: PAINLEVEL_OUTOF10: 7

## 2019-05-30 NOTE — ED PROVIDER NOTES
[address] [place] [address] ED  eMERGENCY dEPARTMENT eNCOUnter      Pt Name: Chelsey Perales  MRN: [id number]  [place] 1955  Date of evaluation: 5/30/2019  Provider: Fareed Levin MD    CHIEF COMPLAINT     Chief Complaint   Patient presents with    Abdominal Pain     mid upper abd pain / past 3wks / saw PCP / no f/u appt w/ gastro specialist has been scheduled as instructed by PCP    Emesis     at onset    Diarrhea     today         HISTORY OF PRESENT ILLNESS   (Location/Symptom, Timing/Onset, Context/Setting,Quality, Duration, Modifying Factors, Severity)  Note limiting factors. Chelsey Perales is a 62 y.o. female who presents to the emergency department with a chief complaint of generalized abdominal pain that is continuous for the last one month. She has occasional loose stool and states she has lost 8 pounds of weight unintentionally in the last few weeks. She saw her primary care provider a week ago and was advised to follow with a gastroenterologist but she has not been able to make an appointment so far. Patient reports multiple stressors at home with family. Her provider recently started her initially on Prozac 10 mg daily about a week ago. Prior to that she was already taking Cymbalta and Wellbutrin. The history is provided by the patient. Nursing Notes werereviewed. REVIEW OF SYSTEMS    (2-9 systems for level 4, 10 or more for level 5)     Review of Systems   All other systems reviewed and are negative. Except as noted above the remainder of the review of systems was reviewed and negative.        PAST MEDICAL HISTORY     Past Medical History:   Diagnosis Date    Anxiety     CAD (coronary artery disease)     Carotid artery stenosis     Cataract     Chronic back pain     on 5/30/19 pt states is presently in pain mgmnt    COPD (chronic obstructive pulmonary disease) (HCC)     Depression     Esophageal reflux     Fibromyalgia     Head injury     Headache(784.0)     Hearing loss     [facility] attack ([facility]     Hyperlipidemia     Hypertension     Insomnia     Migraine     Osteoarthritis     Pneumonia     Reflux     TIA (transient ischemic attack)     Ulcerative colitis ([facility]     Unspecified cerebral artery occlusion with cerebral infarction          SURGICALHISTORY       Past Surgical History:   Procedure Laterality Date    ANESTHESIA NERVE BLOCK Left 11/13/2017    lumbar epidural steroid injection  performed by Carolin Stephens MD at [facility] Left 1/29/2018    NERVE BLOCK LEFT CERVICAL MEDIAL BRANCH C3 TON C4 C5 performed by Carolin Stephens MD at [facility] El[facility]ang Bilateral 4/15/2019    BILATERAL LUMBAR MEDIAL BRANCH NERVE BLOCK L2-L5 performed by Carolin Stephens MD at [address] [place] [address] Bilateral 4/22/2019    BILATERAL LUMBAR MEDIAL BRANCH NERVE BLOCK L2-L5 performed by Carolin Stephens MD at [place] [address]  5/2012    severe spasms in the sigmoid colon , diverticulosis    COSMETIC SURGERY      cheek bone reconstruction     EPIDURAL STEROID INJECTION Left 9/25/2017    EPIDURAL STEROID INJECTION LEFT L4 L5  performed by [facility]in Stephens MD at [facility]  06/21/2014    lumbar CARLEY     LUMBAR SPINE SURGERY  05/05/2014    lumbar CARLEY     NECK SURGERY      cadaverbone placement    NECK SURGERY  06/07/2014    cervical CARLEY     NERVE BLOCK Left 4/7/14    CERVIAL NECK    NERVE BLOCK N/A 2/29/2016    LUMBAR CARLEY    NERVE BLOCK N/A 11/07/2016    LUMBAR CARLEY    NERVE BLOCK Left 11/13/2017    lumbar CARLEY    NERVE BLOCK Left 01/29/2018    C3-5    NERVE BLOCK Right 02/12/2018    cervical medial brach    NERVE BLOCK Right 10/15/2018    Lumbar CARLEY    OTHER SURGICAL HISTORY  12/15/2014    lumbar steroid injection    OTHER SURGICAL HISTORY  7-20-15    left cervical steroid injection    OTHER SURGICAL HISTORY  01-25-16    Bilateral sacroiliac joint injection     OTHER SURGICAL HISTORY  09/25/2017    Lumbar Epidural Steroid injection    OTHER SURGICAL HISTORY Left 08/20/2018    cervical facet injection C2-3 and 4-5    OTHER SURGICAL HISTORY  12/03/2018    NERVE RADIOFREQUENCY ABLATION LEFT CERVICAL MEDIAL BRANCH C3/TON/C4/C5 (Left )    MI INJ DX/THER AGNT PARAVERT FACET JOINT, CERV/THORAC, 1ST LEVEL Left 8/20/2018    LEFT CERVICAL FACET STEROID INJECTION C2/34 C3/4 C4/5 performed by Tamraji Al MD at [facility] DX/THER AGNT PARAVERT FACET JOINT, LUMBAR/SAC, 1ST LEVEL Bilateral 10/15/2018    NANCY LUMBAR FACET STEROID INJECTION performed by Tam MD Mikaela at [address] Left 2/12/2018    NERVE BLOCK LEFT CERVICAL MEDIAL BRANCH C3/TON/C4/C5 performed by Tam Al MD at [address] OFFICE/OUTPT [address] [facility] Left 12/3/2018    NERVE RADIOFREQUENCY ABLATION LEFT CERVICAL MEDIAL BRANCH C3/TON/C4/C5 performed by Tam lA MD at [facility] Left 3/4/2019    NERVE RADIOFREQUENCY ABLATION LEFT CERVICAL   MEDIAL BRANCH  C3/TON/C4/C5 performed by Tamraji Al MD at [place] [address]  5/2012    normal    UPPER GASTROINTESTINAL ENDOSCOPY  7 17 15    biopsy, pathology-mild chronic inflammation         CURRENT MEDICATIONS       Previous Medications    ALPRAZOLAM (XANAX) 1 MG TABLET    Take by mouth as needed .     ASPIRIN 325 MG TABLET    Take 325 mg by mouth as needed for Pain    BEVESPI AEROSPHERE 9-4.8 MCG/ACT AERO    INHALE 2 PUFFS INTO THE LUNGS TWICE DAILY    BUPROPION (WELLBUTRIN XL) 150 MG EXTENDED RELEASE TABLET    Take 150 mg by mouth daily Taking 400 mg daily (2 of 200s)    CETIRIZINE (ZYRTEC) 10 MG TABLET    TAKE 1 TABLET BY MOUTH DAILY    CHOLECALCIFEROL (VITAMIN D3) 1000 UNITS TABS    Take 1 tablet by mouth daily    DOCUSATE SODIUM (COLACE) 100 MG CAPSULE    Take 1 capsule by mouth 2 times daily    DULOXETINE (CYMBALTA) 30 MG EXTENDED RELEASE CAPSULE    Take 1 capsule by mouth daily    DULOXETINE (CYMBALTA) 60 MG EXTENDED RELEASE CAPSULE    Take 1 capsule by mouth daily    FLOVENT  MCG/ACT INHALER    INHALE 2 PUFFS INTO THE LUNGS TWICE DAILY    FLUOXETINE (PROZAC) 10 MG TABLET    Take 10 mg by mouth daily    GABAPENTIN (NEURONTIN) 400 MG CAPSULE    TAKE 1 CAPSULE BY MOUTH THREE TIMES DAILY    HYDROCODONE-ACETAMINOPHEN (NORCO) 7.5-325 MG PER TABLET    Take 1 tablet by mouth every 6 hours as needed for Pain (DO NOT FILL EBFORE 05/25/2019) for up to 30 days. IPRATROPIUM-ALBUTEROL (DUONEB) 0.5-2.5 (3) MG/3ML SOLN NEBULIZER SOLUTION    INHALE 1 VIAL VIA NEBULIZER EVERY 6 HOURS AS NEEDED FOR SHORTNESS OF BREATH    LOSARTAN (COZAAR) 100 MG TABLET    Take 1 tablet by mouth daily    MELOXICAM (MOBIC) 15 MG TABLET    TAKE 1 TABLET BY MOUTH DAILY    MOMETASONE (ELOCON) 0.1 % CREAM    APPLY TOPICALLY DAILY    NEBULIZERS (COMPRESSOR/NEBULIZER) MISC    Use with albuterol solution every 6 hours as needed. NIFEDIPINE (PROCARDIA XL) 60 MG EXTENDED RELEASE TABLET    Take 1 tablet by mouth 2 times daily    OMEPRAZOLE (PRILOSEC) 40 MG DELAYED RELEASE CAPSULE    Take 1 capsule by mouth daily    RANITIDINE (ZANTAC) 300 MG TABLET    Take 1 tablet by mouth nightly    RESTASIS 0.05 % OPHTHALMIC EMULSION    INSTILL 1 DROP INTO BOTH EYES TWICE DAILY    ROPINIROLE (REQUIP) 3 MG TABLET    TAKE 1 TABLET BY MOUTH NIGHTLY    SUCRALFATE (CARAFATE) 1 GM/10ML SUSPENSION    Take 10 mLs by mouth 4 times daily    VENTOLIN  (90 BASE) MCG/ACT INHALER    INHALE 2 PUFFS INTO THE LUNGS EVERY 6 HOURS AS NEEDED FOR WHEEZING       ALLERGIES     Patient has no known allergies.     FAMILY HISTORY       Family History   Problem Relation Age of Onset    Diabetes Mother     Alzheimer's Disease Mother     High Blood Pressure Father     Diabetes Sister     High Blood Pressure Sister     High Blood Pressure Brother     Diabetes Maternal Grandmother           SOCIAL HISTORY       Social History     Socioeconomic History    Marital status:      Spouse name: None    Number of children: None    Years of education: None    Highest education level: None   Occupational History    None   Social Needs    Financial resource strain: None    Food insecurity:     Worry: None     Inability: None    Transportation needs:     Medical: None     Non-medical: None   Tobacco Use    Smoking status: Current Every Day Smoker     Packs/day: 0.50     Years: 40.00     Pack years: 20.00     Types: Cigarettes    Smokeless tobacco: Former User    Tobacco comment: trying to quit smoking about 15 cigs a day - down to 8 a day- gradually decreasing   Substance and Sexual Activity    Alcohol use: Yes     Alcohol/week: 0.0 oz     Comment: rarely once a year    Drug use: No    Sexual activity: None   Lifestyle    Physical activity:     Days per week: None     Minutes per session: None    Stress: None   Relationships    Social connections:     Talks on phone: None     Gets together: None     Attends Yazidism service: None     Active member of club or organization: None     Attends meetings of clubs or organizations: None     Relationship status: None    Intimate partner violence:     Fear of current or ex partner: None     Emotionally abused: None     Physically abused: None     Forced sexual activity: None   Other Topics Concern    None   Social History Narrative    None       SCREENINGS             PHYSICAL EXAM    (up to 7 for level 4, 8 or more for level 5)     ED Triage Vitals [05/30/19 1157]   BP Temp Temp Source Pulse Resp SpO2 Height Weight   (!) 150/83 98.1 °F (36.7 °C) Oral 100 18 98 % 5' 2\" (1.575 m) 156 lb (70.8 kg)       Physical Exam   Constitutional: She is oriented to person, place, and time. She appears well-developed and well-nourished. No distress. HENT:   Head: Normocephalic.    Right Ear: External ear normal.   Left Ear: External ear normal.   Nose: Nose normal.

## 2019-05-30 NOTE — ED NOTES
Pt ambulatory to room 14 with c/o ongoing \"All over\" abd pain x 1 month(Upper abd pain > lower abd pain). Pt reports she seen her PCP at onset of pain who referred her to see GI specialist, states \"I haven't made the appointment yet because of how Etta Sers been feeling\". Pt reports she had N/V at onset of abd pain but denies any active vomiting at this time or recently, just c/o continued nausea. Pt reports she did have one episode of watery black stool today, states \"I took some Pepto Bismol so that's why it's black\". Pt denies any CP, SOB, dizziness, urinary symptoms, or fevers. Abd soft, slightly distended, BS active x 4 quads. Respirations even and non labored. Skin PWD, MMM. NAD noted.       Julia Dow, HUMBERTO  05/30/19 [id number]

## 2019-06-17 ENCOUNTER — TELEPHONE (OUTPATIENT)
Dept: PAIN MANAGEMENT | Age: 64
End: 2019-06-17

## 2019-06-20 RX ORDER — MOMETASONE FUROATE 1 MG/G
CREAM TOPICAL
Qty: 30 G | Refills: 1 | OUTPATIENT
Start: 2019-06-20

## 2019-06-24 ENCOUNTER — HOSPITAL ENCOUNTER (OUTPATIENT)
Age: 64
Setting detail: OUTPATIENT SURGERY
Discharge: HOME | End: 2019-06-24
Attending: ANESTHESIOLOGY | Admitting: ANESTHESIOLOGY
Payer: MEDICARE

## 2019-06-24 ENCOUNTER — APPOINTMENT (OUTPATIENT)
Dept: GENERAL RADIOLOGY | Age: 64
End: 2019-06-24
Attending: ANESTHESIOLOGY
Payer: MEDICARE

## 2019-06-24 VITALS
OXYGEN SATURATION: 96 % | SYSTOLIC BLOOD PRESSURE: 115 MMHG | HEIGHT: 61 IN | HEART RATE: 82 BPM | WEIGHT: 158.8 LBS | RESPIRATION RATE: 12 BRPM | TEMPERATURE: 97 F | BODY MASS INDEX: 29.98 KG/M2 | DIASTOLIC BLOOD PRESSURE: 65 MMHG

## 2019-06-24 PROCEDURE — 99152 MOD SED SAME PHYS/QHP 5/>YRS: CPT | Performed by: ANESTHESIOLOGY

## 2019-06-24 PROCEDURE — 7100000010 HC PHASE II RECOVERY - FIRST 15 MIN: Performed by: ANESTHESIOLOGY

## 2019-06-24 PROCEDURE — 2709999900 HC NON-CHARGEABLE SUPPLY: Performed by: ANESTHESIOLOGY

## 2019-06-24 PROCEDURE — 99153 MOD SED SAME PHYS/QHP EA: CPT | Performed by: ANESTHESIOLOGY

## 2019-06-24 PROCEDURE — 64635 DESTROY LUMB/SAC FACET JNT: CPT | Performed by: ANESTHESIOLOGY

## 2019-06-24 PROCEDURE — 6360000002 HC RX W HCPCS: Performed by: ANESTHESIOLOGY

## 2019-06-24 PROCEDURE — 3600000054 HC PAIN LEVEL 3 BASE: Performed by: ANESTHESIOLOGY

## 2019-06-24 PROCEDURE — 64636 DESTROY L/S FACET JNT ADDL: CPT | Performed by: ANESTHESIOLOGY

## 2019-06-24 PROCEDURE — 7100000011 HC PHASE II RECOVERY - ADDTL 15 MIN: Performed by: ANESTHESIOLOGY

## 2019-06-24 PROCEDURE — 3209999900 FLUORO FOR SURGICAL PROCEDURES

## 2019-06-24 PROCEDURE — 3600000055 HC PAIN LEVEL 3 ADDL 15 MIN: Performed by: ANESTHESIOLOGY

## 2019-06-24 RX ORDER — KETOROLAC TROMETHAMINE 30 MG/ML
INJECTION, SOLUTION INTRAMUSCULAR; INTRAVENOUS PRN
Status: DISCONTINUED | OUTPATIENT
Start: 2019-06-24 | End: 2019-06-24 | Stop reason: ALTCHOICE

## 2019-06-24 RX ORDER — MIDAZOLAM HYDROCHLORIDE 1 MG/ML
INJECTION INTRAMUSCULAR; INTRAVENOUS PRN
Status: DISCONTINUED | OUTPATIENT
Start: 2019-06-24 | End: 2019-06-24 | Stop reason: ALTCHOICE

## 2019-06-24 RX ORDER — FENTANYL CITRATE 50 UG/ML
INJECTION, SOLUTION INTRAMUSCULAR; INTRAVENOUS PRN
Status: DISCONTINUED | OUTPATIENT
Start: 2019-06-24 | End: 2019-06-24 | Stop reason: ALTCHOICE

## 2019-06-24 RX ORDER — DEXAMETHASONE SODIUM PHOSPHATE 10 MG/ML
INJECTION INTRAMUSCULAR; INTRAVENOUS PRN
Status: DISCONTINUED | OUTPATIENT
Start: 2019-06-24 | End: 2019-06-24 | Stop reason: ALTCHOICE

## 2019-06-24 RX ORDER — SODIUM CHLORIDE 0.9 % (FLUSH) 0.9 %
10 SYRINGE (ML) INJECTION 2 TIMES DAILY
Status: CANCELLED | OUTPATIENT
Start: 2019-06-24

## 2019-06-24 ASSESSMENT — PAIN DESCRIPTION - ORIENTATION: ORIENTATION: LOWER

## 2019-06-24 ASSESSMENT — PAIN SCALES - GENERAL
PAINLEVEL_OUTOF10: 1
PAINLEVEL_OUTOF10: 0
PAINLEVEL_OUTOF10: 1
PAINLEVEL_OUTOF10: 0
PAINLEVEL_OUTOF10: 4

## 2019-06-24 ASSESSMENT — PAIN DESCRIPTION - LOCATION: LOCATION: BACK

## 2019-06-24 ASSESSMENT — PAIN DESCRIPTION - PAIN TYPE: TYPE: CHRONIC PAIN

## 2019-06-24 NOTE — H&P
Keegan Hinojosa MD at [facility] Left 1/29/2018    NERVE BLOCK LEFT CERVICAL MEDIAL BRANCH C3 TON C4 C5 performed by Keegan Hinojosa MD at [facility] Bilateral 4/15/2019    BILATERAL LUMBAR MEDIAL BRANCH NERVE BLOCK L2-L5 performed by Keegan Hinojosa MD at [facility] Bilateral 4/22/2019    BILATERAL LUMBAR MEDIAL BRANCH NERVE BLOCK L2-L5 performed by Keegan Hinojosa MD at [address] [place] [address]      No stents placed.      COLONOSCOPY  5/2012    severe spasms in the sigmoid colon , diverticulosis    COSMETIC SURGERY      cheek bone reconstruction     EPIDURAL STEROID INJECTION Left 9/25/2017    EPIDURAL STEROID INJECTION LEFT L4 L5  performed by Keegan Hinojosa MD at [facility]  06/21/2014    lumbar CARLEY     LUMBAR SPINE SURGERY  05/05/2014    lumbar CARLEY     NECK SURGERY      cadaverbone placement    NECK SURGERY  06/07/2014    cervical CARLEY     NERVE BLOCK Left 4/7/14    CERVIAL NECK    NERVE BLOCK N/A 2/29/2016    LUMBAR CARLEY    NERVE BLOCK N/A 11/07/2016    LUMBAR CARLEY    NERVE BLOCK Left 11/13/2017    lumbar CARLEY    NERVE BLOCK Left 01/29/2018    C3-5    NERVE BLOCK Right 02/12/2018    cervical medial brach    NERVE BLOCK Right 10/15/2018    Lumbar CARLEY    OTHER SURGICAL HISTORY  12/15/2014    lumbar steroid injection    OTHER SURGICAL HISTORY  7-20-15    left cervical steroid injection    OTHER SURGICAL HISTORY  01-25-16    Bilateral sacroiliac joint injection     OTHER SURGICAL HISTORY  09/25/2017    Lumbar Epidural Steroid injection    OTHER SURGICAL HISTORY Left 08/20/2018    cervical facet injection C2-3 and 4-5    OTHER SURGICAL HISTORY  12/03/2018    NERVE RADIOFREQUENCY ABLATION LEFT CERVICAL MEDIAL BRANCH C3/TON/C4/C5 (Left )    [place] INJ DX/THER AGNT PARAVERT FACET JOINT, CERV/THORAC, 1ST LEVEL Left 8/20/2018    LEFT CERVICAL FACET STEROID INJECTION C2/34 C3/4 C4/5 performed AERO INHALE 2 PUFFS INTO THE LUNGS TWICE DAILY 5/1/19  Yes TAMI Howard CNP   sucralfate (CARAFATE) 1 GM/10ML suspension Take 10 mLs by mouth 4 times daily 4/22/19  Yes TAMI Howard CNP   cetirizine (ZYRTEC) 10 MG tablet TAKE 1 TABLET BY MOUTH DAILY 3/20/19  Yes TAMI Howard CNP   ipratropium-albuterol (DUONEB) 0.5-2.5 (3) MG/3ML SOLN nebulizer solution INHALE 1 VIAL VIA NEBULIZER EVERY 6 HOURS AS NEEDED FOR SHORTNESS OF BREATH 3/20/19  Yes TAMI Howard CNP   rOPINIRole (REQUIP) 3 MG tablet TAKE 1 TABLET BY MOUTH NIGHTLY 3/20/19  Yes TAMI Kincaid CNP   losartan (COZAAR) 100 MG tablet Take 1 tablet by mouth daily 3/18/19  Yes TAMI Helton Ace, CNP   NIFEdipine (PROCARDIA XL) 60 MG extended release tablet Take 1 tablet by mouth 2 times daily 3/18/19  Yes TAMI Helton Ace, CNP   VENTOLIN  (90 Base) MCG/ACT inhaler INHALE 2 PUFFS INTO THE LUNGS EVERY 6 HOURS AS NEEDED FOR WHEEZING 1/26/19  Yes TAMI Howard CNP   buPROPion (WELLBUTRIN XL) 150 MG extended release tablet Take 150 mg by mouth daily Taking 400 mg daily (2 of 200s) 3/12/18  Yes JUSTIN Hernandez   FLOVENT  MCG/ACT inhaler INHALE 2 PUFFS INTO THE LUNGS TWICE DAILY  Patient taking differently: INHALE 2 PUFFS INTO THE LUNGS TWICE DAILY PRN 1/15/18  Yes TAMI Howard CNP   ALPRAZolam Mónica Zuly) 1 MG tablet Take by mouth as needed .  10/10/17  Yes Historical Provider, MD   DULoxetine (CYMBALTA) 30 MG extended release capsule Take 1 capsule by mouth daily 5/27/17  Yes TAMI Howard CNP   DULoxetine (CYMBALTA) 60 MG extended release capsule Take 1 capsule by mouth daily 5/27/17  Yes TAMI Howard CNP   meloxicam (MOBIC) 15 MG tablet TAKE 1 TABLET BY MOUTH DAILY 5/23/19   Gilles Lama MD   mometasone (ELOCON) 0.1 % cream APPLY TOPICALLY DAILY 5/15/19   TAMI Howard CNP   aspirin 325 MG tablet Take 325 mg by mouth as needed for Pain    Historical Provider, MD   RESTASIS 0.05 % ophthalmic emulsion INSTILL 1 DROP INTO BOTH EYES TWICE DAILY 3/20/19   TAMI Chávez CNP   Nebulizers (COMPRESSOR/NEBULIZER) MISC Use with albuterol solution every 6 hours as needed. 3/20/15   TAMI Burrell CNP        Allergies:     Patient has no known allergies. Social History:     Tobacco:    reports that she has been smoking cigarettes. She has a 20.00 pack-year smoking history. She has quit using smokeless tobacco.  Alcohol:      reports that she drinks alcohol. Drug Use:  reports that she does not use drugs. Family History:     Family History   Problem Relation Age of Onset    Diabetes Mother     Alzheimer's Disease Mother     High Blood Pressure Father     Diabetes Sister     High Blood Pressure Sister     High Blood Pressure Brother     Diabetes Maternal Grandmother        Review of Systems:     Positive and Negative as described in HPI. CONSTITUTIONAL: Weight loss of 8 pounds due to stress. Negative for fevers, chills, sweats, and fatigue. HEENT: Pt wears reading glasses. Hearing loss. Negative for rhinorrhea and throat pain. RESPIRATORY: SOB with exertion. COPD. Denies asthma and sleep apnea. Negative for current shortness of breath, cough, congestion, and wheezing. CARDIOVASCULAR: Negative for chest pain, blood clot, irregular heart beat, and palpitations. GASTROINTESTINAL: Diarrhea and vomiting two weeks ago. Reflux. Negative for nausea, current vomiting, current diarrhea, constipation, change in bowel habits, and abdominal pain. GENITOURINARY: Negative for difficulty of urination, burning with urination, and frequency. INTEGUMENT: Negative for rash, skin lesions, and easy bruising. HEMATOLOGIC/LYMPHATIC:  Negative for swelling/edema. ALLERGIC/IMMUNOLOGIC:  Negative for urticaria and itching. ENDOCRINE: Increased thirst. Negative for diabetes and heat or cold intolerance.   MUSCULOSKELETAL: See HPI.  NEUROLOGICAL: Migraines. Numbness and tingling in bilateral arms. Negative for dizziness and lightheadedness. BEHAVIOR/PSYCH: Treated depression and anxiety. Stress. Physical Exam:   /61   Pulse 89   Temp 98 °F (36.7 °C) (Oral)   Resp 16   Ht 5' 1\" (1.549 m)   Wt 158 lb 12.8 oz (72 kg)   SpO2 94%   BMI 30.00 kg/m²   No LMP recorded. Patient is postmenopausal.  [id number]  No results for input(s): POCGLU in the last 72 hours. General Appearance:  Alert, well appearing, and in no acute distress. Obese. Mental status:  Oriented to person, place, and time. Head:  Normocephalic and atraumatic. Eye:  No icterus, redness, pupils equal and reactive, extraocular eye movements intact, and conjunctiva clear. Ear:  Hearing grossly intact. Nose:  No drainage noted. Mouth:  Mucous membranes moist.  Neck:  Distant bilateral carotid sounds. Supple and no carotid bruits noted. Lungs: Diminished throughout. No wheezing, rales or rhonchi, and normal effort. Cardiovascular:  Normal rate, regular rhythm, no murmur, gallop, and rub. Abdomen:  Soft, non-tender, non-distended, and active bowel sounds. Neurologic:  Normal speech and cranial nerves II through XII grossly intact. Strength 5/5 bilaterally. Skin:  No gross lesions, rashes, bruising, or bleeding on exposed skin area. Extremities:  Posterior tibial pulses 2+ bilaterally. No pedal edema. No calf tenderness with palpation. Psych:  Normal affect. Investigations:      Laboratory Testing:  No results found for this or any previous visit (from the past 24 hour(s)). Recent Labs     05/30/19  1226   HGB 13.1   HCT 41.3   WBC 5.9   MCV 88.4      K 4.0   CL 98   CO2 26   BUN 15   CREATININE 0.69   GLUCOSE 105*   AST 15   ALT 22   LABALBU 4.2       Diagnosis:      1. Chronic bilateral low back pain without sciatica, lumbosacral spondylosis without myelopathy    Plans:     1.  Right lumbar medial branch nerve radiofrequency ablation L2-L5 Roseann Craig, TAMI - CNP  6/24/2019  8:13 AM

## 2019-06-24 NOTE — OP NOTE
SEDATION NOTE:    ASA CLASSIFICATION  2  MP   CLASSIFICATION  2    · Moderate intravenous conscious sedation was supervised by Dr. James Dubois  . The patient was independently monitored by a Registered Nurse assigned to the Procedure Room  . Monitoring included automated blood pressure, continuous EKG, Capnography and continuous pulse oximetry. . The detailed Conscious Record is permanently stored in the [place] [id number]. The following is the conscious sedation record;  Start Time:  0840  End times:  0901  Duration:  21 minutes  MEDS GIVEN 3 MG VERSED  MCG FENTANYL    Preoperative Diagnosis: Lumbar spondylosis w/o myelopathy, chronic low back pain  Postoperative Diagnosis: Lumbar spondylosis w/o myelopathy, chronic low back pain    Procedure Performed:  :Radiofrequency ablation of median branches at the Transverse processes of L3 / L4 / L5 and S1 on Right under fluoroscopic guidance with IV sedation. Procedure:    After starting an IV, the patient was taken to procedure room. The patient was placed in prone position and skin over the back was prepped and draped in sterile manner. Standard monitors were connected and vitals were monitored during the case and they remained stable during the procedure. A meaningful communication was kept up with the patient throughout the procedure. Then using fluoroscopy the junction of the transverse process of the target vertebra with the superior process of the facet joint was observed and the view was optimized. The skin and deep tissues were infiltrated with 2 ml of  1 % lidocaine. The RF canula with the 10 mm active tip was introduced through the skin wheal under fluoroscopy guidance such that the tip of the needle lies in the groove of the transverse process with the superior processes of the facet joint. Then a lateral and AP view of the lumbar spine was obtained to make sure the tip of needle is not in the neural foramen.   Then electric impedence was checked to make sure it is acceptable. Then a sensory stimulus was applied at 50 Hz up to 0.5 volt and concordant pain symptoms were reproduced. Then a motor stimulus was applied at 2 Hz up to 2 volts or 3 x times the sensory stimulus and no motor stimulation was seen in lower extremities. Some multifidus stimulus was seen. Then after negative aspiration 1 ml of 4% lidocaine was injected through the needle at each level. The radiofrequency lesion was done at 85 degrees centigrade for 110 seconds. For L5 median branch block the junction of the ala of  the sacrum with the superior articular process of the facet joint was taken as a reference point. For the other levels median branch nerves the junction of the transverse process with the superolateral possible facet joint was taken as a reference point    Patient's vital signs and neurological status remained stable throughout the procedure and post procedural period. The patient tolerated the procedure well and was discharged home in stable condition.

## 2019-06-25 DIAGNOSIS — M47.817 LUMBOSACRAL SPONDYLOSIS WITHOUT MYELOPATHY: Primary | ICD-10-CM

## 2019-06-25 DIAGNOSIS — Z79.891 CHRONIC USE OF OPIATE DRUG FOR THERAPEUTIC PURPOSE: ICD-10-CM

## 2019-06-25 RX ORDER — HYDROCODONE BITARTRATE AND ACETAMINOPHEN 7.5; 325 MG/1; MG/1
1 TABLET ORAL EVERY 6 HOURS PRN
Qty: 120 TABLET | Refills: 0 | Status: SHIPPED | OUTPATIENT
Start: 2019-06-25 | End: 2019-07-16 | Stop reason: SDUPTHER

## 2019-06-25 NOTE — TELEPHONE ENCOUNTER
Michelle Hadleyhoney is requesting a refill on the following medications:   Requested Prescriptions     Pending Prescriptions Disp Refills    HYDROcodone-acetaminophen (NORCO) 7.5-325 MG per tablet 120 tablet 0     Sig: Take 1 tablet by mouth every 6 hours as needed for Pain (DO NOT FILL EBFORE 05/25/2019) for up to 30 days. Last OV  5/23/2019, Patient was seen yesterday 6/24/2019 for injections. Future Appointments   Date Time Provider Seema Agosto   7/8/2019  1:20 PM TAMI Shahid CNP [facility]   7/22/2019 11:20 AM TAMI Florentino CNP [facility]       OARRS report sent to Dr. Susan lAfredo through alternative route for review.

## 2019-07-05 ENCOUNTER — TELEPHONE (OUTPATIENT)
Dept: PAIN MANAGEMENT | Age: 64
End: 2019-07-05

## 2019-07-16 ENCOUNTER — OFFICE VISIT (OUTPATIENT)
Dept: PAIN MANAGEMENT | Age: 64
End: 2019-07-16
Payer: MEDICARE

## 2019-07-16 VITALS
SYSTOLIC BLOOD PRESSURE: 120 MMHG | DIASTOLIC BLOOD PRESSURE: 77 MMHG | WEIGHT: 155 LBS | OXYGEN SATURATION: 94 % | BODY MASS INDEX: 28.52 KG/M2 | HEART RATE: 99 BPM | HEIGHT: 62 IN

## 2019-07-16 DIAGNOSIS — Z79.891 CHRONIC USE OF OPIATE DRUG FOR THERAPEUTIC PURPOSE: ICD-10-CM

## 2019-07-16 DIAGNOSIS — M47.817 LUMBOSACRAL SPONDYLOSIS WITHOUT MYELOPATHY: ICD-10-CM

## 2019-07-16 DIAGNOSIS — Z79.899 CHRONIC PRESCRIPTION BENZODIAZEPINE USE: ICD-10-CM

## 2019-07-16 DIAGNOSIS — G89.29 CHRONIC BILATERAL LOW BACK PAIN WITHOUT SCIATICA: Primary | Chronic | ICD-10-CM

## 2019-07-16 DIAGNOSIS — M54.50 CHRONIC BILATERAL LOW BACK PAIN WITHOUT SCIATICA: Primary | Chronic | ICD-10-CM

## 2019-07-16 DIAGNOSIS — Z98.1 HX OF FUSION OF CERVICAL SPINE: ICD-10-CM

## 2019-07-16 PROCEDURE — G8598 ASA/ANTIPLAT THER USED: HCPCS | Performed by: ANESTHESIOLOGY

## 2019-07-16 PROCEDURE — 3017F COLORECTAL CA SCREEN DOC REV: CPT | Performed by: ANESTHESIOLOGY

## 2019-07-16 PROCEDURE — 4004F PT TOBACCO SCREEN RCVD TLK: CPT | Performed by: ANESTHESIOLOGY

## 2019-07-16 PROCEDURE — G8417 CALC BMI ABV UP PARAM F/U: HCPCS | Performed by: ANESTHESIOLOGY

## 2019-07-16 PROCEDURE — G8427 DOCREV CUR MEDS BY ELIG CLIN: HCPCS | Performed by: ANESTHESIOLOGY

## 2019-07-16 PROCEDURE — 99213 OFFICE O/P EST LOW 20 MIN: CPT | Performed by: ANESTHESIOLOGY

## 2019-07-16 RX ORDER — HYDROCODONE BITARTRATE AND ACETAMINOPHEN 7.5; 325 MG/1; MG/1
1 TABLET ORAL EVERY 6 HOURS PRN
Qty: 120 TABLET | Refills: 0 | Status: SHIPPED | OUTPATIENT
Start: 2019-07-24 | End: 2019-08-20 | Stop reason: SDUPTHER

## 2019-07-16 RX ORDER — MELOXICAM 15 MG/1
TABLET ORAL
Qty: 30 TABLET | Refills: 1 | Status: ON HOLD | OUTPATIENT
Start: 2019-07-16 | End: 2021-12-16 | Stop reason: HOSPADM

## 2019-07-16 RX ORDER — GABAPENTIN 400 MG/1
CAPSULE ORAL
Qty: 90 CAPSULE | Refills: 1 | Status: SHIPPED | OUTPATIENT
Start: 2019-07-16 | End: 2021-12-16

## 2019-07-16 RX ORDER — MOMETASONE FUROATE 1 MG/G
CREAM TOPICAL
Qty: 30 G | Refills: 1 | Status: SHIPPED | OUTPATIENT
Start: 2019-07-16 | End: 2019-08-14 | Stop reason: SDUPTHER

## 2019-07-16 ASSESSMENT — ENCOUNTER SYMPTOMS
BACK PAIN: 1
SHORTNESS OF BREATH: 1

## 2019-07-16 NOTE — PROGRESS NOTES
History:   Diagnosis Date    Anxiety     CAD (coronary artery disease)     Carotid artery stenosis     Cataract     Chronic back pain     on 5/30/19 pt states is presently in pain mgmnt    COPD (chronic obstructive pulmonary disease) (HCC)     Depression     Esophageal reflux     Fibromyalgia     Head injury     Headache(784.0)     Hearing loss     Heart attack ([facility] [address] Unknown    Hyperlipidemia     Hypertension     Insomnia     Migraine     Osteoarthritis     Pneumonia     Reflux     Restless leg syndrome     TIA (transient ischemic attack) 2005    Ulcerative colitis ([facility] [address]     Unspecified cerebral artery occlusion with cerebral infarction     Pt denies history of a stroke (Written 06/24/2019)      Past Surgical History:   Procedure Laterality Date    ANESTHESIA NERVE BLOCK Left 11/13/2017    lumbar epidural steroid injection  performed by Edu Rubin MD at [facility] Left 1/29/2018    NERVE BLOCK LEFT CERVICAL MEDIAL BRANCH C3 TON C4 C5 performed by Edu Rubin MD at [facility] Bilateral 4/15/2019    BILATERAL LUMBAR MEDIAL [address] L2-L5 performed by Edu Rubin MD at [facility] Bilateral 4/22/2019    BILATERAL LUMBAR MEDIAL BRANCH NERVE BLOCK L2-L5 performed by Edu Rubin MD at [address] [place] [address]      No stents placed.      COLONOSCOPY  5/2012    severe spasms in the sigmoid colon , diverticulosis    COSMETIC SURGERY      cheek bone reconstruction     EPIDURAL STEROID INJECTION Left 9/25/2017    EPIDURAL STEROID INJECTION LEFT L4 L5  performed by Edu Rubin MD at [facility]  06/21/2014    lumbar CARLEY     LUMBAR SPINE SURGERY  05/05/2014    lumbar CARLEY     NECK SURGERY      cadaverbone placement    NECK SURGERY  06/07/2014    cervical CARLEY     NERVE BLOCK Left 4/7/14    CERVIAL NECK    NERVE BLOCK N/A 2/29/2016    LUMBAR CARLEY    NERVE

## 2019-07-17 ENCOUNTER — OFFICE VISIT (OUTPATIENT)
Dept: FAMILY MEDICINE CLINIC | Age: 64
End: 2019-07-17
Payer: MEDICARE

## 2019-07-17 VITALS
HEART RATE: 88 BPM | WEIGHT: 157 LBS | OXYGEN SATURATION: 99 % | SYSTOLIC BLOOD PRESSURE: 112 MMHG | DIASTOLIC BLOOD PRESSURE: 60 MMHG | BODY MASS INDEX: 29.18 KG/M2

## 2019-07-17 DIAGNOSIS — F32.89 OTHER DEPRESSION: ICD-10-CM

## 2019-07-17 DIAGNOSIS — G89.29 CHRONIC BILATERAL LOW BACK PAIN WITHOUT SCIATICA: Chronic | ICD-10-CM

## 2019-07-17 DIAGNOSIS — M54.50 CHRONIC BILATERAL LOW BACK PAIN WITHOUT SCIATICA: Chronic | ICD-10-CM

## 2019-07-17 DIAGNOSIS — F90.9 HYPERACTIVITY: Primary | ICD-10-CM

## 2019-07-17 PROBLEM — Z79.899 CHRONIC PRESCRIPTION BENZODIAZEPINE USE: Status: RESOLVED | Noted: 2017-01-04 | Resolved: 2019-07-17

## 2019-07-17 PROCEDURE — 3017F COLORECTAL CA SCREEN DOC REV: CPT | Performed by: NURSE PRACTITIONER

## 2019-07-17 PROCEDURE — 99213 OFFICE O/P EST LOW 20 MIN: CPT | Performed by: NURSE PRACTITIONER

## 2019-07-17 PROCEDURE — G8417 CALC BMI ABV UP PARAM F/U: HCPCS | Performed by: NURSE PRACTITIONER

## 2019-07-17 PROCEDURE — G8598 ASA/ANTIPLAT THER USED: HCPCS | Performed by: NURSE PRACTITIONER

## 2019-07-17 PROCEDURE — 4004F PT TOBACCO SCREEN RCVD TLK: CPT | Performed by: NURSE PRACTITIONER

## 2019-07-17 PROCEDURE — G8427 DOCREV CUR MEDS BY ELIG CLIN: HCPCS | Performed by: NURSE PRACTITIONER

## 2019-07-17 ASSESSMENT — ENCOUNTER SYMPTOMS
COUGH: 1
ABDOMINAL PAIN: 0
BACK PAIN: 1

## 2019-07-17 NOTE — PROGRESS NOTES
[facility]  [facility] [address]  [address] [facility] [address]  Dept: [phone]  Dept Fax: [phone]      Alveta Bosworth is a 62 y.o. female who presents today for hermedical conditions/complaints as noted below. Alveta Bosworth is c/o of Other (wants referral)            HPI:      HPI  Frontier Signs is here today with a lot of her social stressors eliminated. She is feeling much less stress and her stomach has calmed down. She has been going to [facility]. Seeing [facility]. She she has been getting her antidepressants and xanax. Her provider there did not feel that she should go off of the xanax. Britta Ashford did wean herself off of the xanax. She feels that she is easily distracted. She is starting too many things each day and finishing none. She has attempted to talk with [facility] and she increased meds to the point of her feeling like a zombie. She has gone back to her original dosing. She continues to see Dr. James Pallas for back pain. She feels that she does not want to get any further injections, or at least would like to take a break from the injections. She is getting pain medication.        Hemoglobin A1C (%)   Date Value   12/12/2012 5.2   12/16/2011 5.4   09/22/2011 5.2             ( goal A1Cis < 7)   No results found for: LABMICR  LDL Cholesterol (mg/dL)   Date Value   01/24/2017 159 (H)   03/20/2014 210 (H)   12/12/2012 139 (H)     LDL Calculated (mg/dL)   Date Value   08/30/2018 145       (goal LDL is <100)   AST (U/L)   Date Value   05/30/2019 15     ALT (U/L)   Date Value   05/30/2019 22     BUN (mg/dL)   Date Value   05/30/2019 15     BP Readings from Last 3 Encounters:   07/17/19 112/60   07/16/19 120/77   06/24/19 115/65          (goal 120/80)    Past Medical History:   Diagnosis Date    Anxiety     CAD (coronary artery disease)     Carotid artery stenosis     Cataract     Chronic back pain     on 5/30/19 pt states is presently in pain mgmnt    COPD (chronic

## 2019-07-18 DIAGNOSIS — J44.9 CHRONIC OBSTRUCTIVE PULMONARY DISEASE, UNSPECIFIED COPD TYPE (HCC): ICD-10-CM

## 2019-07-18 RX ORDER — GLYCOPYRROLATE AND FORMOTEROL FUMARATE 9; 4.8 UG/1; UG/1
AEROSOL, METERED RESPIRATORY (INHALATION)
Qty: 10.7 G | Refills: 3 | OUTPATIENT
Start: 2019-07-18

## 2019-08-06 DIAGNOSIS — R10.13 EPIGASTRIC PAIN: ICD-10-CM

## 2019-08-07 RX ORDER — ALBUTEROL SULFATE 90 UG/1
AEROSOL, METERED RESPIRATORY (INHALATION)
Qty: 18 G | Refills: 5 | OUTPATIENT
Start: 2019-08-07

## 2019-08-07 RX ORDER — SUCRALFATE 1 G/10ML
SUSPENSION ORAL
Qty: 1200 ML | Refills: 3 | Status: SHIPPED | OUTPATIENT
Start: 2019-08-07 | End: 2020-02-24

## 2019-08-07 NOTE — TELEPHONE ENCOUNTER
radiculopathy     Fibrocystic breast changes of both breasts     Umbilical hernia without obstruction and without gangrene     Lumbosacral spondylosis without myelopathy     Mixed hyperlipidemia     Chronic bilateral low back pain without sciatica     Hormone imbalance     Epigastric pain

## 2019-08-12 ENCOUNTER — TELEPHONE (OUTPATIENT)
Dept: PAIN MANAGEMENT | Age: 64
End: 2019-08-12

## 2019-08-14 DIAGNOSIS — G25.81 RLS (RESTLESS LEGS SYNDROME): ICD-10-CM

## 2019-08-14 DIAGNOSIS — E55.9 VITAMIN D DEFICIENCY: ICD-10-CM

## 2019-08-14 DIAGNOSIS — J44.9 CHRONIC OBSTRUCTIVE PULMONARY DISEASE, UNSPECIFIED COPD TYPE (HCC): ICD-10-CM

## 2019-08-14 DIAGNOSIS — I10 ESSENTIAL HYPERTENSION: ICD-10-CM

## 2019-08-14 RX ORDER — IPRATROPIUM BROMIDE AND ALBUTEROL SULFATE 2.5; .5 MG/3ML; MG/3ML
SOLUTION RESPIRATORY (INHALATION)
Qty: 90 ML | Refills: 5 | Status: SHIPPED | OUTPATIENT
Start: 2019-08-14

## 2019-08-14 RX ORDER — GLYCOPYRROLATE AND FORMOTEROL FUMARATE 9; 4.8 UG/1; UG/1
AEROSOL, METERED RESPIRATORY (INHALATION)
Qty: 10.7 G | Refills: 3 | Status: SHIPPED | OUTPATIENT
Start: 2019-08-14

## 2019-08-14 RX ORDER — ROPINIROLE 3 MG/1
TABLET, FILM COATED ORAL
Qty: 30 TABLET | Refills: 5 | Status: SHIPPED | OUTPATIENT
Start: 2019-08-14

## 2019-08-14 RX ORDER — MOMETASONE FUROATE 1 MG/G
CREAM TOPICAL
Qty: 30 G | Refills: 1 | Status: SHIPPED | OUTPATIENT
Start: 2019-08-14

## 2019-08-14 RX ORDER — LOSARTAN POTASSIUM 100 MG/1
100 TABLET ORAL DAILY
Qty: 30 TABLET | Refills: 5 | Status: ON HOLD | OUTPATIENT
Start: 2019-08-14 | End: 2020-02-26

## 2019-08-14 RX ORDER — MELATONIN
Qty: 30 TABLET | Refills: 5 | Status: SHIPPED | OUTPATIENT
Start: 2019-08-14

## 2019-08-14 RX ORDER — NIFEDIPINE 60 MG/1
TABLET, EXTENDED RELEASE ORAL
Qty: 60 TABLET | Refills: 5 | Status: SHIPPED | OUTPATIENT
Start: 2019-08-14

## 2019-08-14 NOTE — TELEPHONE ENCOUNTER
Last visit: 7/17/19  Last Med refill: 5/1/19, 7/16/19, 5/15/19, 3/20/19  Does patient have enough medication for 72 hours: Yes    Next Visit Date:  Future Appointments   Date Time Provider Seema Agosto   8/20/2019 12:00 PM Gera Moreno MD [facility] [address] Maintenance   Topic Date Due    Shingles Vaccine (1 of 2) 01/16/2006    Flu vaccine (1) 09/01/2019    Diabetes screen  01/24/2020    Potassium monitoring  05/30/2020    Creatinine monitoring  05/30/2020    Breast cancer screen  03/04/2021    Cervical cancer screen  10/06/2021    Colon cancer screen colonoscopy  05/16/2022    Lipid screen  08/30/2023    DTaP/Tdap/Td vaccine (2 - Td) 12/27/2027    Pneumococcal 0-64 years Vaccine  Completed    Hepatitis C screen  Completed    HIV screen  Completed       Hemoglobin A1C (%)   Date Value   12/12/2012 5.2   12/16/2011 5.4   09/22/2011 5.2             ( goal A1C is < 7)   No results found for: LABMICR  LDL Cholesterol (mg/dL)   Date Value   01/24/2017 159 (H)   03/20/2014 210 (H)     LDL Calculated (mg/dL)   Date Value   08/30/2018 145       (goal LDL is <100)   AST (U/L)   Date Value   05/30/2019 15     ALT (U/L)   Date Value   05/30/2019 22     BUN (mg/dL)   Date Value   05/30/2019 15     BP Readings from Last 3 Encounters:   07/17/19 112/60   07/16/19 120/77   06/24/19 115/65          (goal 120/80)    All Future Testing planned in [facility]  Lab Frequency Next Occurrence   Destruction by neurolytic agent Once 05/24/2019   Destruction by neurolytic agent Once 05/24/2019               Patient Active Problem List:     Chronic obstructive pulmonary disease (HCC)     HTN (hypertension)     GERD (gastroesophageal reflux disease)     CAD (coronary artery disease)     Depression     Anxiety     Chronic use of opiate drugs therapeutic purposes     MI, old     Diverticulosis of colon     Cervical spondylosis     RLS (restless legs syndrome)     Hx of fusion of cervical spine     Cervical spondylosis with radiculopathy     Fibrocystic breast changes of both breasts     Umbilical hernia without obstruction and without gangrene     Lumbosacral spondylosis without myelopathy     Mixed hyperlipidemia     Chronic bilateral low back pain without sciatica     Hormone imbalance     Epigastric pain

## 2019-08-20 ENCOUNTER — OFFICE VISIT (OUTPATIENT)
Dept: PAIN MANAGEMENT | Age: 64
End: 2019-08-20
Payer: MEDICARE

## 2019-08-20 VITALS
WEIGHT: 157.8 LBS | OXYGEN SATURATION: 97 % | HEART RATE: 101 BPM | SYSTOLIC BLOOD PRESSURE: 115 MMHG | DIASTOLIC BLOOD PRESSURE: 81 MMHG | BODY MASS INDEX: 29.04 KG/M2 | HEIGHT: 62 IN

## 2019-08-20 DIAGNOSIS — G89.29 CHRONIC NECK PAIN: ICD-10-CM

## 2019-08-20 DIAGNOSIS — G89.29 CHRONIC BILATERAL LOW BACK PAIN WITHOUT SCIATICA: Primary | Chronic | ICD-10-CM

## 2019-08-20 DIAGNOSIS — M54.2 CHRONIC NECK PAIN: ICD-10-CM

## 2019-08-20 DIAGNOSIS — Z79.891 CHRONIC USE OF OPIATE DRUG FOR THERAPEUTIC PURPOSE: ICD-10-CM

## 2019-08-20 DIAGNOSIS — M54.50 CHRONIC BILATERAL LOW BACK PAIN WITHOUT SCIATICA: Primary | Chronic | ICD-10-CM

## 2019-08-20 DIAGNOSIS — Z98.1 HX OF FUSION OF CERVICAL SPINE: ICD-10-CM

## 2019-08-20 DIAGNOSIS — Z79.899 CHRONIC PRESCRIPTION BENZODIAZEPINE USE: ICD-10-CM

## 2019-08-20 PROCEDURE — G8417 CALC BMI ABV UP PARAM F/U: HCPCS | Performed by: ANESTHESIOLOGY

## 2019-08-20 PROCEDURE — 3017F COLORECTAL CA SCREEN DOC REV: CPT | Performed by: ANESTHESIOLOGY

## 2019-08-20 PROCEDURE — 4004F PT TOBACCO SCREEN RCVD TLK: CPT | Performed by: ANESTHESIOLOGY

## 2019-08-20 PROCEDURE — G8598 ASA/ANTIPLAT THER USED: HCPCS | Performed by: ANESTHESIOLOGY

## 2019-08-20 PROCEDURE — G8427 DOCREV CUR MEDS BY ELIG CLIN: HCPCS | Performed by: ANESTHESIOLOGY

## 2019-08-20 PROCEDURE — 99213 OFFICE O/P EST LOW 20 MIN: CPT | Performed by: ANESTHESIOLOGY

## 2019-08-20 RX ORDER — HYDROCODONE BITARTRATE AND ACETAMINOPHEN 7.5; 325 MG/1; MG/1
1 TABLET ORAL EVERY 6 HOURS PRN
Qty: 120 TABLET | Refills: 0 | Status: SHIPPED | OUTPATIENT
Start: 2019-08-23 | End: 2021-12-16

## 2019-08-20 ASSESSMENT — ENCOUNTER SYMPTOMS
RESPIRATORY NEGATIVE: 1
BACK PAIN: 1

## 2019-08-20 NOTE — PROGRESS NOTES
facet injection C2-3 and 4-5    OTHER SURGICAL HISTORY  12/03/2018    NERVE RADIOFREQUENCY ABLATION LEFT CERVICAL MEDIAL BRANCH C3/TON/C4/C5 (Left )    CT INJ DX/THER AGNT PARAVERT FACET JOINT, CERV/THORAC, 1ST LEVEL Left 8/20/2018    LEFT CERVICAL FACET STEROID INJECTION C2/34 C3/4 C4/5 performed by Fredis No MD at [facility] DX/THER AGNT PARAVERT FACET JOINT, LUMBAR/SAC, 1ST LEVEL Bilateral 10/15/2018    NANCY LUMBAR FACET STEROID INJECTION performed by Fredis No MD at [address] Left 2/12/2018    NERVE BLOCK LEFT CERVICAL MEDIAL BRANCH C3/TON/C4/C5 performed by Fredis No MD at [address] OFFICE/OUTPT [address] Left 12/3/2018    NERVE RADIOFREQUENCY ABLATION LEFT CERVICAL MEDIAL BRANCH C3/TON/C4/C5 performed by Fredis No MD at [address] Left 3/4/2019    NERVE RADIOFREQUENCY ABLATION LEFT CERVICAL   MEDIAL BRANCH  C3/TON/C4/C5 performed by Fredis No MD at [address] Right 6/24/2019    RIGHT LUMBAR MEDIAL [address] [facility]  L2-L5 performed by Fredis No MD at [address] [facility] [place] ENDOSCOPY  5/2012    normal    UPPER GASTROINTESTINAL ENDOSCOPY  7 17 15    biopsy, pathology-mild chronic inflammation     Social History     Socioeconomic History    Marital status:      Spouse name: None    Number of children: None    Years of education: None    Highest education level: None   Occupational History    None   Social Needs    Financial resource strain: None    Food insecurity:     Worry: None     Inability: None    Transportation needs:     Medical: None     Non-medical: None   Tobacco Use    Smoking status: Current Every Day Smoker     Packs/day: 0.50     Years: 40.00     Pack years: 20.00     Types: Cigarettes    Smokeless tobacco: Former User    Tobacco comment: trying to quit smoking about 15 release tablet TAKE 1 TABLET BY MOUTH TWICE DAILY 60 tablet 5    mometasone (ELOCON) 0.1 % cream APPLY TOPICALLY TO THE AFFECTED AREA(S) DAILY 30 g 1    BEVESPI AEROSPHERE 9-4.8 MCG/ACT AERO INHALE 2 PUFFS BY MOUTH INTO THE LUNGS TWICE DAILY 10.7 g 3    CARAFATE 1 GM/10ML suspension TAKE 10MLS BY MOUTH FOUR TIMES DAILY 1200 mL 3    gabapentin (NEURONTIN) 400 MG capsule TAKE 1 CAPSULE BY MOUTH THREE TIMES DAILY 90 capsule 1    meloxicam (MOBIC) 15 MG tablet TAKE 1 TABLET BY MOUTH DAILY 30 tablet 1    FLUoxetine (PROZAC) 10 MG tablet Take 20 mg by mouth       dicyclomine (BENTYL) 10 MG capsule Take 1 capsule by mouth 3 times daily as needed (cramps) For abdominal cramps. 20 capsule 0    ranitidine (ZANTAC) 300 MG tablet Take 1 tablet by mouth nightly 90 tablet 1    omeprazole (PRILOSEC) 40 MG delayed release capsule Take 1 capsule by mouth daily 90 capsule 1    aspirin 325 MG tablet Take 325 mg by mouth as needed for Pain      cetirizine (ZYRTEC) 10 MG tablet TAKE 1 TABLET BY MOUTH DAILY 90 tablet 1    RESTASIS 0.05 % ophthalmic emulsion INSTILL 1 DROP INTO BOTH EYES TWICE DAILY 60 each 5    VENTOLIN  (90 Base) MCG/ACT inhaler INHALE 2 PUFFS INTO THE LUNGS EVERY 6 HOURS AS NEEDED FOR WHEEZING 18 g 5    buPROPion (WELLBUTRIN XL) 150 MG extended release tablet Take 300 mg by mouth daily Indications: 150 mg x2 daily Taking 400 mg daily (2 of 200s)      FLOVENT  MCG/ACT inhaler INHALE 2 PUFFS INTO THE LUNGS TWICE DAILY (Patient taking differently: INHALE 2 PUFFS INTO THE LUNGS TWICE DAILY PRN) 12 g 5    DULoxetine (CYMBALTA) 60 MG extended release capsule Take 1 capsule by mouth daily (Patient taking differently: Take 120 mg by mouth daily ) 30 capsule 5    Nebulizers (COMPRESSOR/NEBULIZER) MISC Use with albuterol solution every 6 hours as needed. 1 each 0    hydrOXYzine Pamoate (VISTARIL PO) Take by mouth      ALPRAZolam (XANAX) 1 MG tablet Take by mouth as needed .       DULoxetine (CYMBALTA) 30 MG extended release capsule Take 1 capsule by mouth daily (Patient not taking: Reported on 8/20/2019) 30 capsule 5     Current Facility-Administered Medications   Medication Dose Route Frequency Provider Last Rate Last Dose    triamcinolone acetonide (KENALOG-40) injection 40 mg  40 mg Intramuscular Once Keerthi Butler, APRN - CNP         Review of Systems   Constitutional: Positive for diaphoresis and fatigue. Negative for fever. HENT: Negative. Respiratory: Negative. Cardiovascular: Negative. Musculoskeletal: Positive for back pain and neck pain. Neurological: Positive for numbness (hands ). Psychiatric/Behavioral: Positive for agitation, decreased concentration, dysphoric mood and sleep disturbance. The patient is nervous/anxious (anxious). Objective:  General Appearance:  Well-appearing and in no acute distress. Vital signs: (most recent): Blood pressure 115/81, pulse 101, height 5' 1.5\" (1.562 m), weight 157 lb 12.8 oz (71.6 kg), SpO2 97 %, not currently breastfeeding. Vital signs are normal.  No fever. HEENT: Normal HEENT exam.    Lungs:  Normal effort. Breath sounds clear to auscultation. Heart: Normal rate. Extremities: Normal range of motion. Neurological: Patient is alert and oriented to person, place and time. Normal strength. Patient has normal reflexes, normal muscle tone and normal coordination. Pupils:  Pupils are equal, round, and reactive to light. Skin:  Warm, dry and pale. No rash, ecchymosis, cyanosis or ulceration.       Assessment & Plan     -Recent prescription of benzodiazepine filled on August 14  Pill count of Xanax showed 60 tablets  I explained to patient that if she wants to continue opioid we must discontinue benzodiazepine  I had asked her that we will need to document Xanax waist  Patient became very upset stating that this indicates that we do not trust her and she is very upset for our pill count of Xanax and states that this was not prescribed by our clinic so we should not be doing a pill count on that medication    I explained to her that I am have to ensure that she is not using benzodiazepine    I also explained to her that I would need to do run a urine toxicology as this should be negative for benzos as patient is a stating she has been off benzodiazepine for 1 month    She states that she is having severe anxiety because of Xanax discontinuation    She then stated that it was promised to her that her opioids will be increased if she discontinue Xanax    I explained to her again that there will be no increase in opioid and it was not mentioned in my note and opioids are not to replace Xanax    We are simply trying to manage the respiratory depression risk of opioids in combination with Xanax    Patient is extremely tearful and upset    She states that she would like to find the pain clinic  I also feel there is a complete breakdown of our doctor-patient relationship    We will discharge patient from our care    She is provided a prescription of hydrocodone with a fill date August 23  No further refill from our clinic  1. Chronic bilateral low back pain without sciatica    2. Hx of fusion of cervical spine    3. Chronic use of opiate drugs therapeutic purposes    4. Chronic prescription benzodiazepine use    5. Chronic neck pain        No orders of the defined types were placed in this encounter. Orders Placed This Encounter   Medications    HYDROcodone-acetaminophen (NORCO) 7.5-325 MG per tablet     Sig: Take 1 tablet by mouth every 6 hours as needed for Pain for up to 30 days.  To be filled 7/24/19     Dispense:  120 tablet     Refill:  0     Reduce doses taken as pain becomes manageable          Electronically signed by Claire Jamison MD on 8/20/2019 at 12:54 PM

## 2019-09-06 ENCOUNTER — TELEPHONE (OUTPATIENT)
Dept: FAMILY MEDICINE CLINIC | Age: 64
End: 2019-09-06

## 2019-09-06 DIAGNOSIS — G89.29 CHRONIC BILATERAL LOW BACK PAIN WITHOUT SCIATICA: Primary | ICD-10-CM

## 2019-09-06 DIAGNOSIS — M54.50 CHRONIC BILATERAL LOW BACK PAIN WITHOUT SCIATICA: Primary | ICD-10-CM

## 2019-09-17 ENCOUNTER — HOSPITAL ENCOUNTER (OUTPATIENT)
Age: 64
Discharge: HOME | End: 2019-09-17
Payer: MEDICARE

## 2019-09-17 LAB
ALBUMIN SERPL-MCNC: 4.4 G/DL (ref 3.5–5.2)
ALBUMIN/GLOBULIN RATIO: 1.5 (ref 1–2.5)
ALP BLD-CCNC: 88 U/L (ref 35–104)
ALT SERPL-CCNC: 15 U/L (ref 5–33)
ANION GAP SERPL CALCULATED.3IONS-SCNC: 12 MMOL/L (ref 9–17)
AST SERPL-CCNC: 17 U/L
BILIRUB SERPL-MCNC: 0.17 MG/DL (ref 0.3–1.2)
BUN BLDV-MCNC: 13 MG/DL (ref 8–23)
BUN/CREAT BLD: ABNORMAL (ref 9–20)
CALCIUM SERPL-MCNC: 9.4 MG/DL (ref 8.6–10.4)
CHLORIDE BLD-SCNC: 101 MMOL/L (ref 98–107)
CHOLESTEROL/HDL RATIO: 4
CHOLESTEROL: 245 MG/DL
CO2: 26 MMOL/L (ref 20–31)
CREAT SERPL-MCNC: 0.76 MG/DL (ref 0.5–0.9)
GFR AFRICAN AMERICAN: >60 ML/MIN
GFR NON-AFRICAN AMERICAN: >60 ML/MIN
GFR SERPL CREATININE-BSD FRML MDRD: ABNORMAL ML/MIN/{1.73_M2}
GFR SERPL CREATININE-BSD FRML MDRD: ABNORMAL ML/MIN/{1.73_M2}
GLUCOSE BLD-MCNC: 89 MG/DL (ref 70–99)
HDLC SERPL-MCNC: 62 MG/DL
LDL CHOLESTEROL: 129 MG/DL (ref 0–130)
POTASSIUM SERPL-SCNC: 5 MMOL/L (ref 3.7–5.3)
SODIUM BLD-SCNC: 139 MMOL/L (ref 135–144)
TOTAL PROTEIN: 7.3 G/DL (ref 6.4–8.3)
TRIGL SERPL-MCNC: 269 MG/DL
TSH SERPL DL<=0.05 MIU/L-ACNC: 2.2 MIU/L (ref 0.3–5)
VITAMIN B-12: 436 PG/ML (ref 232–1245)
VLDLC SERPL CALC-MCNC: ABNORMAL MG/DL (ref 1–30)

## 2019-09-17 PROCEDURE — 80061 LIPID PANEL: CPT

## 2019-09-17 PROCEDURE — 80053 COMPREHEN METABOLIC PANEL: CPT

## 2019-09-17 PROCEDURE — 84443 ASSAY THYROID STIM HORMONE: CPT

## 2019-09-17 PROCEDURE — 36415 COLL VENOUS BLD VENIPUNCTURE: CPT

## 2019-09-17 PROCEDURE — 82607 VITAMIN B-12: CPT

## 2019-09-18 DIAGNOSIS — H04.129 DRY EYE: ICD-10-CM

## 2019-09-18 RX ORDER — CYCLOSPORINE 0.5 MG/ML
EMULSION OPHTHALMIC
Qty: 60 EACH | Refills: 5 | Status: SHIPPED | OUTPATIENT
Start: 2019-09-18

## 2019-09-18 RX ORDER — RANITIDINE 300 MG/1
TABLET ORAL
Qty: 30 TABLET | OUTPATIENT
Start: 2019-09-18

## 2019-09-18 RX ORDER — CETIRIZINE HYDROCHLORIDE 10 MG/1
TABLET ORAL
Qty: 90 TABLET | Refills: 1 | Status: SHIPPED | OUTPATIENT
Start: 2019-09-18

## 2019-09-18 NOTE — TELEPHONE ENCOUNTER
Last visit: 7/17/19  Last Med refill: 3/20/19  Does patient have enough medication for 72 hours: No:     Next Visit Date:  Future Appointments   Date Time Provider Seema Agosto   10/10/2019  7:50 AM TAMI Gutierres CNP [facility] [address] Maintenance   Topic Date Due    Shingles Vaccine (1 of 2) 01/16/2006    Flu vaccine (1) 09/01/2019    Potassium monitoring  09/17/2020    Creatinine monitoring  09/17/2020    Breast cancer screen  03/04/2021    Cervical cancer screen  10/06/2021    Colon cancer screen colonoscopy  05/16/2022    Lipid screen  09/17/2024    DTaP/Tdap/Td vaccine (2 - Td) 12/27/2027    Pneumococcal 0-64 years Vaccine  Completed    Hepatitis C screen  Completed    HIV screen  Completed       Hemoglobin A1C (%)   Date Value   12/12/2012 5.2   12/16/2011 5.4   09/22/2011 5.2             ( goal A1C is < 7)   No results found for: LABMICR  LDL Cholesterol (mg/dL)   Date Value   09/17/2019 129   01/24/2017 159 (H)     LDL Calculated (mg/dL)   Date Value   08/30/2018 145       (goal LDL is <100)   AST (U/L)   Date Value   09/17/2019 17     ALT (U/L)   Date Value   09/17/2019 15     BUN (mg/dL)   Date Value   09/17/2019 13     BP Readings from Last 3 Encounters:   08/20/19 115/81   07/17/19 112/60   07/16/19 120/77          (goal 120/80)    All Future Testing planned in [facility]  Lab Frequency Next Occurrence               Patient Active Problem List:     Chronic obstructive pulmonary disease (HCC)     HTN (hypertension)     GERD (gastroesophageal reflux disease)     CAD (coronary artery disease)     Depression     Anxiety     Chronic use of opiate drugs therapeutic purposes     MI, old     Diverticulosis of colon     Cervical spondylosis     RLS (restless legs syndrome)     Hx of fusion of cervical spine     Cervical spondylosis with radiculopathy     Fibrocystic breast changes of both breasts     Umbilical hernia without obstruction and without gangrene     Lumbosacral spondylosis without myelopathy     Mixed hyperlipidemia     Chronic bilateral low back pain without sciatica     Hormone imbalance     Epigastric pain

## 2019-10-08 ENCOUNTER — HOSPITAL ENCOUNTER (OUTPATIENT)
Dept: GENERAL RADIOLOGY | Age: 64
Discharge: HOME | End: 2019-10-10
Payer: MEDICARE

## 2019-10-08 ENCOUNTER — HOSPITAL ENCOUNTER (OUTPATIENT)
Age: 64
Discharge: HOME | End: 2019-10-10
Payer: MEDICARE

## 2019-10-08 DIAGNOSIS — R06.00 DYSPNEA, UNSPECIFIED TYPE: ICD-10-CM

## 2019-10-08 DIAGNOSIS — K21.9 CHRONIC GERD: ICD-10-CM

## 2019-10-08 DIAGNOSIS — I10 BENIGN ESSENTIAL HYPERTENSION: ICD-10-CM

## 2019-10-08 PROCEDURE — 71046 X-RAY EXAM CHEST 2 VIEWS: CPT

## 2019-10-11 DIAGNOSIS — K21.9 GASTROESOPHAGEAL REFLUX DISEASE, ESOPHAGITIS PRESENCE NOT SPECIFIED: ICD-10-CM

## 2019-10-11 RX ORDER — OMEPRAZOLE 40 MG/1
40 CAPSULE, DELAYED RELEASE ORAL DAILY
Qty: 30 CAPSULE | OUTPATIENT
Start: 2019-10-11

## 2019-10-12 RX ORDER — MOMETASONE FUROATE 1 MG/G
CREAM TOPICAL
Qty: 30 G | Refills: 1 | OUTPATIENT
Start: 2019-10-12

## 2019-10-23 RX ORDER — ALBUTEROL SULFATE 90 UG/1
AEROSOL, METERED RESPIRATORY (INHALATION)
Qty: 18 G | Refills: 5 | OUTPATIENT
Start: 2019-10-23

## 2019-11-08 DIAGNOSIS — J44.9 CHRONIC OBSTRUCTIVE PULMONARY DISEASE, UNSPECIFIED COPD TYPE (HCC): ICD-10-CM

## 2019-11-08 RX ORDER — GLYCOPYRROLATE AND FORMOTEROL FUMARATE 9; 4.8 UG/1; UG/1
AEROSOL, METERED RESPIRATORY (INHALATION)
Qty: 10.7 G | Refills: 3 | OUTPATIENT
Start: 2019-11-08

## 2019-11-09 DIAGNOSIS — K21.9 GASTROESOPHAGEAL REFLUX DISEASE, ESOPHAGITIS PRESENCE NOT SPECIFIED: ICD-10-CM

## 2019-11-09 RX ORDER — OMEPRAZOLE 40 MG/1
40 CAPSULE, DELAYED RELEASE ORAL DAILY
Qty: 30 CAPSULE | OUTPATIENT
Start: 2019-11-09

## 2019-11-12 RX ORDER — MOMETASONE FUROATE 1 MG/G
CREAM TOPICAL
Qty: 30 G | Refills: 1 | OUTPATIENT
Start: 2019-11-12

## 2020-02-04 RX ORDER — ROPINIROLE 3 MG/1
TABLET, FILM COATED ORAL
Qty: 30 TABLET | Refills: 5 | OUTPATIENT
Start: 2020-02-04

## 2020-02-04 RX ORDER — IPRATROPIUM BROMIDE AND ALBUTEROL SULFATE 2.5; .5 MG/3ML; MG/3ML
SOLUTION RESPIRATORY (INHALATION)
Qty: 90 ML | Refills: 5 | OUTPATIENT
Start: 2020-02-04

## 2020-02-04 RX ORDER — VITAMIN B COMPLEX
TABLET ORAL
Qty: 30 TABLET | Refills: 5 | OUTPATIENT
Start: 2020-02-04

## 2020-02-04 RX ORDER — NIFEDIPINE 60 MG/1
TABLET, EXTENDED RELEASE ORAL
Qty: 60 TABLET | Refills: 5 | OUTPATIENT
Start: 2020-02-04

## 2020-02-24 ENCOUNTER — OFFICE VISIT (OUTPATIENT)
Dept: GASTROENTEROLOGY | Age: 65
End: 2020-02-24
Payer: MEDICARE

## 2020-02-24 ENCOUNTER — TELEPHONE (OUTPATIENT)
Dept: GASTROENTEROLOGY | Age: 65
End: 2020-02-24

## 2020-02-24 VITALS
WEIGHT: 164.7 LBS | DIASTOLIC BLOOD PRESSURE: 84 MMHG | BODY MASS INDEX: 30.62 KG/M2 | HEART RATE: 108 BPM | SYSTOLIC BLOOD PRESSURE: 132 MMHG

## 2020-02-24 PROCEDURE — G8484 FLU IMMUNIZE NO ADMIN: HCPCS | Performed by: INTERNAL MEDICINE

## 2020-02-24 PROCEDURE — 99244 OFF/OP CNSLTJ NEW/EST MOD 40: CPT | Performed by: INTERNAL MEDICINE

## 2020-02-24 PROCEDURE — G8417 CALC BMI ABV UP PARAM F/U: HCPCS | Performed by: INTERNAL MEDICINE

## 2020-02-24 PROCEDURE — G8427 DOCREV CUR MEDS BY ELIG CLIN: HCPCS | Performed by: INTERNAL MEDICINE

## 2020-02-24 ASSESSMENT — ENCOUNTER SYMPTOMS
BACK PAIN: 1
TROUBLE SWALLOWING: 0
NAUSEA: 1
ANAL BLEEDING: 0
SHORTNESS OF BREATH: 1
DIARRHEA: 0
COUGH: 1
CHOKING: 1
SINUS PRESSURE: 0
VOMITING: 0
ABDOMINAL DISTENTION: 0
VOICE CHANGE: 1
SORE THROAT: 1
BLOOD IN STOOL: 0
ABDOMINAL PAIN: 1
CONSTIPATION: 0
WHEEZING: 0
RECTAL PAIN: 0

## 2020-02-24 NOTE — TELEPHONE ENCOUNTER
Patient was scheduled with writer in office today for colon/egd. Angelica Ferrera is scheduled for this Wednesday 2/26/20 at [facility]. Writer spoke with Dr. Swati Orozco regarding case, he is aware that patient is taking Mobic although she will not take it tomorrow. Dr. Swati Orozco asked for patient to start CLD this evening and to take Suprep early in the afternoon tomorrow.  Writer called and spoke with Angelica Ferrera, she expressed understanding and is agreeable to starting CLD today and will dose Suprep tomorrow at [facility].

## 2020-02-24 NOTE — PROGRESS NOTES
wheezing. Cardiovascular: Negative for chest pain, palpitations and leg swelling. Gastrointestinal: Positive for abdominal pain and nausea. Negative for abdominal distention, anal bleeding, blood in stool, constipation, diarrhea, rectal pain and vomiting. GERD symptoms   Genitourinary: Negative for difficulty urinating. Musculoskeletal: Positive for arthralgias and back pain. Negative for gait problem and myalgias. Allergic/Immunologic: Negative for environmental allergies and food allergies. Neurological: Positive for numbness. Negative for dizziness, weakness, light-headedness and headaches. Hematological: Does not bruise/bleed easily. Psychiatric/Behavioral: Positive for sleep disturbance. The patient is nervous/anxious. Reviewed and agree  Objective:   Physical Exam  Nursing note reviewed. Constitutional:       Appearance: She is well-developed. Comments: Anxious    HENT:      Head: Normocephalic and atraumatic. Eyes:      Conjunctiva/sclera: Conjunctivae normal.      Pupils: Pupils are equal, round, and reactive to light. Neck:      Musculoskeletal: Normal range of motion and neck supple. Cardiovascular:      Heart sounds: Normal heart sounds. Pulmonary:      Effort: Pulmonary effort is normal.      Breath sounds: Wheezing and rales present. Abdominal:      General: Bowel sounds are normal.      Palpations: Abdomen is soft. Comments: Mild  TENDER, NON DISTENTED  LIVER SPLEEN AND HERNIAS ARE NOT  PALPABLE  BOWEL SOUNDS ARE POSITIVE      Musculoskeletal: Normal range of motion. Skin:     General: Skin is warm. Neurological:      Mental Status: She is alert and oriented to person, place, and time.    Psychiatric:         Behavior: Behavior normal.         Assessment:      Patient Active Problem List   Diagnosis    Chronic obstructive pulmonary disease (HCC)    HTN (hypertension)    GERD (gastroesophageal reflux disease)    CAD (coronary artery disease)    Depression    Anxiety    Chronic use of opiate drugs therapeutic purposes    MI, old    Diverticulosis of colon    Cervical spondylosis    RLS (restless legs syndrome)    Hx of fusion of cervical spine    Cervical spondylosis with radiculopathy    Fibrocystic breast changes of both breasts    Umbilical hernia without obstruction and without gangrene    Lumbosacral spondylosis without myelopathy    Mixed hyperlipidemia    Chronic bilateral low back pain without sciatica    Hormone imbalance    Epigastric pain    Iron deficiency anemia           Plan:      Plan EGD and Colonoscopy     The Endoscopic procedure was explained to the patient in detail  The prep and NPO were explained  All the Risks, Benefits, and Alternatives were explained  Risk of Bleeding, Perforation and Cardio Respiratory risks were explained  her questions were answered  The procedure has been scheduled with the  in the office  Patient was asked to give us a call for any questions  The patient has verbalized understanding and agreement to this plan. Pt was discussed in detail about the possible side effects of proton pump inhibiter therapy. She was explained about the possibility of calcium and magnesium malabsorption and was advised to start taking calcium supplements with Vit D. Some over the counter regimens were explained to patient. Some dietary advices were also given. She has verbalized understanding and agreement to this. Pt was advised in detail about some life style and dietary modifications. She was advised about avoidance of caffeine, nicotine and chocolate. Pt was also told to stay away from any kind of fast foods, soda pops. She was also advised to avoid lots of spices, grease and fried food etc.     Instructions were also given about trying to arrange the timing, quality and quantity of food.     Instructions were given about using ample amount of fiber including dietary and supplemental fiber

## 2020-02-25 ENCOUNTER — ANESTHESIA EVENT (OUTPATIENT)
Dept: OPERATING ROOM | Age: 65
End: 2020-02-25
Payer: MEDICARE

## 2020-02-25 NOTE — TELEPHONE ENCOUNTER
Spoke with Sure, informed her that she may still have procedure as planned tomorrow. She only ate one strawberry jello last night. She does have a  as well.

## 2020-02-26 ENCOUNTER — ANESTHESIA (OUTPATIENT)
Dept: OPERATING ROOM | Age: 65
End: 2020-02-26
Payer: MEDICARE

## 2020-02-26 ENCOUNTER — HOSPITAL ENCOUNTER (OUTPATIENT)
Age: 65
Setting detail: OUTPATIENT SURGERY
Discharge: HOME | End: 2020-02-26
Attending: INTERNAL MEDICINE | Admitting: INTERNAL MEDICINE
Payer: MEDICARE

## 2020-02-26 VITALS
OXYGEN SATURATION: 99 % | DIASTOLIC BLOOD PRESSURE: 82 MMHG | RESPIRATION RATE: 16 BRPM | SYSTOLIC BLOOD PRESSURE: 152 MMHG

## 2020-02-26 VITALS
RESPIRATION RATE: 13 BRPM | BODY MASS INDEX: 30.21 KG/M2 | HEART RATE: 79 BPM | HEIGHT: 61 IN | TEMPERATURE: 96.8 F | OXYGEN SATURATION: 99 % | SYSTOLIC BLOOD PRESSURE: 151 MMHG | WEIGHT: 160 LBS | DIASTOLIC BLOOD PRESSURE: 83 MMHG

## 2020-02-26 PROCEDURE — 3609012400 HC EGD TRANSORAL BIOPSY SINGLE/MULTIPLE: Performed by: INTERNAL MEDICINE

## 2020-02-26 PROCEDURE — 7100000010 HC PHASE II RECOVERY - FIRST 15 MIN: Performed by: INTERNAL MEDICINE

## 2020-02-26 PROCEDURE — 6360000002 HC RX W HCPCS: Performed by: NURSE ANESTHETIST, CERTIFIED REGISTERED

## 2020-02-26 PROCEDURE — 88342 IMHCHEM/IMCYTCHM 1ST ANTB: CPT

## 2020-02-26 PROCEDURE — 45380 COLONOSCOPY AND BIOPSY: CPT | Performed by: INTERNAL MEDICINE

## 2020-02-26 PROCEDURE — 3700000001 HC ADD 15 MINUTES (ANESTHESIA): Performed by: INTERNAL MEDICINE

## 2020-02-26 PROCEDURE — 7100000011 HC PHASE II RECOVERY - ADDTL 15 MIN: Performed by: INTERNAL MEDICINE

## 2020-02-26 PROCEDURE — 2709999900 HC NON-CHARGEABLE SUPPLY: Performed by: INTERNAL MEDICINE

## 2020-02-26 PROCEDURE — 43239 EGD BIOPSY SINGLE/MULTIPLE: CPT | Performed by: INTERNAL MEDICINE

## 2020-02-26 PROCEDURE — 2580000003 HC RX 258: Performed by: ANESTHESIOLOGY

## 2020-02-26 PROCEDURE — 3609010400 HC COLONOSCOPY POLYPECTOMY HOT BIOPSY: Performed by: INTERNAL MEDICINE

## 2020-02-26 PROCEDURE — 88305 TISSUE EXAM BY PATHOLOGIST: CPT

## 2020-02-26 PROCEDURE — 3700000000 HC ANESTHESIA ATTENDED CARE: Performed by: INTERNAL MEDICINE

## 2020-02-26 PROCEDURE — 2500000003 HC RX 250 WO HCPCS: Performed by: NURSE ANESTHETIST, CERTIFIED REGISTERED

## 2020-02-26 RX ORDER — PROPOFOL 10 MG/ML
INJECTION, EMULSION INTRAVENOUS PRN
Status: DISCONTINUED | OUTPATIENT
Start: 2020-02-26 | End: 2020-02-26 | Stop reason: SDUPTHER

## 2020-02-26 RX ORDER — SODIUM CHLORIDE 9 MG/ML
INJECTION, SOLUTION INTRAVENOUS CONTINUOUS
Status: DISCONTINUED | OUTPATIENT
Start: 2020-02-27 | End: 2020-02-26

## 2020-02-26 RX ORDER — LIDOCAINE HYDROCHLORIDE 10 MG/ML
1 INJECTION, SOLUTION EPIDURAL; INFILTRATION; INTRACAUDAL; PERINEURAL
Status: DISCONTINUED | OUTPATIENT
Start: 2020-02-27 | End: 2020-02-26 | Stop reason: HOSPADM

## 2020-02-26 RX ORDER — SODIUM CHLORIDE 0.9 % (FLUSH) 0.9 %
10 SYRINGE (ML) INJECTION PRN
Status: DISCONTINUED | OUTPATIENT
Start: 2020-02-26 | End: 2020-02-26 | Stop reason: HOSPADM

## 2020-02-26 RX ORDER — FENTANYL CITRATE 50 UG/ML
INJECTION, SOLUTION INTRAMUSCULAR; INTRAVENOUS PRN
Status: DISCONTINUED | OUTPATIENT
Start: 2020-02-26 | End: 2020-02-26 | Stop reason: SDUPTHER

## 2020-02-26 RX ORDER — LIDOCAINE HYDROCHLORIDE 20 MG/ML
INJECTION, SOLUTION INFILTRATION; PERINEURAL PRN
Status: DISCONTINUED | OUTPATIENT
Start: 2020-02-26 | End: 2020-02-26 | Stop reason: SDUPTHER

## 2020-02-26 RX ORDER — SODIUM CHLORIDE, SODIUM LACTATE, POTASSIUM CHLORIDE, CALCIUM CHLORIDE 600; 310; 30; 20 MG/100ML; MG/100ML; MG/100ML; MG/100ML
INJECTION, SOLUTION INTRAVENOUS CONTINUOUS
Status: DISCONTINUED | OUTPATIENT
Start: 2020-02-27 | End: 2020-02-26 | Stop reason: HOSPADM

## 2020-02-26 RX ORDER — SODIUM CHLORIDE 0.9 % (FLUSH) 0.9 %
10 SYRINGE (ML) INJECTION EVERY 12 HOURS SCHEDULED
Status: DISCONTINUED | OUTPATIENT
Start: 2020-02-26 | End: 2020-02-26 | Stop reason: HOSPADM

## 2020-02-26 RX ADMIN — PROPOFOL 50 MG: 10 INJECTION, EMULSION INTRAVENOUS at 09:09

## 2020-02-26 RX ADMIN — Medication 50 MCG: at 09:11

## 2020-02-26 RX ADMIN — PROPOFOL 50 MG: 10 INJECTION, EMULSION INTRAVENOUS at 09:16

## 2020-02-26 RX ADMIN — Medication 50 MCG: at 09:07

## 2020-02-26 RX ADMIN — PROPOFOL 50 MG: 10 INJECTION, EMULSION INTRAVENOUS at 09:19

## 2020-02-26 RX ADMIN — PROPOFOL 50 MG: 10 INJECTION, EMULSION INTRAVENOUS at 09:12

## 2020-02-26 RX ADMIN — SODIUM CHLORIDE, POTASSIUM CHLORIDE, SODIUM LACTATE AND CALCIUM CHLORIDE: 600; 310; 30; 20 INJECTION, SOLUTION INTRAVENOUS at 08:42

## 2020-02-26 RX ADMIN — PROPOFOL 50 MG: 10 INJECTION, EMULSION INTRAVENOUS at 09:10

## 2020-02-26 RX ADMIN — LIDOCAINE HYDROCHLORIDE 50 MG: 20 INJECTION, SOLUTION INFILTRATION; PERINEURAL at 09:09

## 2020-02-26 RX ADMIN — PROPOFOL 50 MG: 10 INJECTION, EMULSION INTRAVENOUS at 09:22

## 2020-02-26 ASSESSMENT — PULMONARY FUNCTION TESTS
PIF_VALUE: 0

## 2020-02-26 ASSESSMENT — PAIN SCALES - GENERAL: PAINLEVEL_OUTOF10: 0

## 2020-02-26 NOTE — ANESTHESIA PRE PROCEDURE
Department of Anesthesiology  Preprocedure Note       Name:  Dimple Ruano   Age:  60 y.o.  :  1955                                          MRN:  [id number]         Date:  2020      Surgeon: Cecilia Be):  Everette Casey MD    Procedure: COLORECTAL CANCER SCREENING, NOT HIGH RISK (N/A )  EGD ESOPHAGOGASTRODUODENOSCOPY (N/A )    Medications prior to admission:   Prior to Admission medications    Medication Sig Start Date End Date Taking? Authorizing Provider   cetirizine (ZYRTEC) 10 MG tablet TAKE 1 TABLET BY MOUTH DAILY 19   TAMI Howard CNP   RESTASIS 0.05 % ophthalmic emulsion INSTILL 1 DROP INTO BOTH EYES TWICE DAILY 19   TAMI Mckeon CNP   hydrOXYzine Pamoate (VISTARIL PO) Take by mouth    Historical Provider, MD   HYDROcodone-acetaminophen (NORCO) 7.5-325 MG per tablet Take 1 tablet by mouth every 6 hours as needed for Pain for up to 30 days.  To be filled 19  Janene Jon MD   rOPINIRole (REQUIP) 3 MG tablet TAKE 1 TABLET BY MOUTH EVERY NIGHT 19   TAMI Howard CNP   ipratropium-albuterol (DUONEB) 0.5-2.5 (3) MG/3ML SOLN nebulizer solution INHALE THE CONTENTS OF 1 VIAL (3ML) BY MOUTH AND INTO THE LUNGS ONCE EVERY 6 HOURS AS NEEDED FOR FOR SHORTNESS OF BREATH 19   TAMI Mckeon CNP   Cholecalciferol (VITAMIN D3) 1000 units TABS TAKE 1 TABLET BY MOUTH DAILY 19   TAMI Holden CNP   losartan (COZAAR) 100 MG tablet TAKE 1 TABLET BY MOUTH DAILY 19   TAMI Mckeon CNP   NIFEdipine (PROCARDIA XL) 60 MG extended release tablet TAKE 1 TABLET BY MOUTH TWICE DAILY 19   TAMI Howard CNP   mometasone (ELOCON) 0.1 % cream APPLY TOPICALLY TO THE AFFECTED AREA(S) DAILY 19   TAMI Howard CNP   BEVESPI AEROSPHERE 9-4.8 MCG/ACT AERO INHALE 2 PUFFS BY MOUTH INTO THE LUNGS TWICE DAILY 19   TAMI Howard CNP   gabapentin (NEURONTIN) 400 MG capsule TAKE 1 CAPSULE BY extended release capsule Take 1 capsule by mouth daily 30 capsule 5    DULoxetine (CYMBALTA) 60 MG extended release capsule Take 1 capsule by mouth daily (Patient taking differently: Take 120 mg by mouth daily ) 30 capsule 5    Nebulizers (COMPRESSOR/NEBULIZER) MISC Use with albuterol solution every 6 hours as needed.  1 each 0       Allergies:  No Known Allergies    Problem List:    Patient Active Problem List   Diagnosis Code    Chronic obstructive pulmonary disease (HCC) J44.9    HTN (hypertension) I10    GERD (gastroesophageal reflux disease) K21.9    CAD (coronary artery disease) I25.10    Depression F32.9    Anxiety F41.9    Chronic use of opiate drugs therapeutic purposes Z74.26    MI, old I25.2    Diverticulosis of colon K57.30    Cervical spondylosis M47.812    RLS (restless legs syndrome) G25.81    Hx of fusion of cervical spine Z98.1    Cervical spondylosis with radiculopathy M47.22    Fibrocystic breast changes of both breasts N60.11, E39.77    Umbilical hernia without obstruction and without gangrene K42.9    Lumbosacral spondylosis without myelopathy M47.817    Mixed hyperlipidemia E78.2    Chronic bilateral low back pain without sciatica M54.5, G89.29    Hormone imbalance E34.9    Epigastric pain R10.13    Iron deficiency anemia D50.9       Past Medical History:        Diagnosis Date    Anxiety     CAD (coronary artery disease)     Carotid artery stenosis     Cataract     Chronic back pain     on 5/30/19 pt states is presently in pain mgmnt    COPD [facility]chronic obstructive pulmonary disease) [facility])     Depression     Esophageal reflux     Fibromyalgia     Head injury     Headache(784.0)     Hearing loss     Heart attack ([address] Unknown    Hyperlipidemia     Hypertension     Insomnia     Iron deficiency anemia     Migraine     Osteoarthritis     Pneumonia     Reflux     Restless leg syndrome     TIA (transient ischemic attack) 2005    Ulcerative colitis ([address] There were no vitals filed for this visit. BP Readings from Last 3 Encounters:   02/24/20 132/84   08/20/19 115/81   07/17/19 112/60       NPO Status:                                                                                 BMI:   Wt Readings from Last 3 Encounters:   02/24/20 164 lb 11.2 oz (74.7 kg)   08/20/19 157 lb 12.8 oz (71.6 kg)   07/17/19 157 lb (71.2 kg)     There is no height or weight on file to calculate BMI.    CBC:   Lab Results   Component Value Date    WBC 5.9 05/30/2019    RBC 4.67 05/30/2019    RBC 3.92 12/16/2011    HGB 13.1 05/30/2019    HCT 41.3 05/30/2019    MCV 88.4 05/30/2019    RDW 13.1 05/30/2019     05/30/2019     12/16/2011       CMP:   Lab Results   Component Value Date     09/17/2019    K 5.0 09/17/2019     09/17/2019    CO2 26 09/17/2019    BUN 13 09/17/2019    CREATININE 0.76 09/17/2019    GFRAA >60 09/17/2019    LABGLOM >60 09/17/2019    GLUCOSE 89 09/17/2019    GLUCOSE 103 09/22/2011    PROT 7.3 09/17/2019    CALCIUM 9.4 09/17/2019    BILITOT 0.17 09/17/2019    ALKPHOS 88 09/17/2019    AST 17 09/17/2019    ALT 15 09/17/2019       POC Tests: No results for input(s): POCGLU, POCNA, POCK, POCCL, POCBUN, POCHEMO, POCHCT in the last 72 hours.     Coags: No results found for: PROTIME, INR, APTT    HCG (If Applicable): No results found for: PREGTESTUR, PREGSERUM, HCG, HCGQUANT     ABGs: No results found for: PHART, PO2ART, WEA1MFX, WYS1YZS, BEART, V6EJBOVL     Type & Screen (If Applicable):  No results found for: LABABO, [address]    Anesthesia Evaluation  Patient summary reviewed and Nursing notes reviewed  Airway: Mallampati: II  TM distance: >3 FB   Neck ROM: full  Mouth opening: > = 3 FB Dental: normal exam         Pulmonary:normal exam  breath sounds clear to auscultation                             Cardiovascular:  Exercise tolerance: good (>4 METS),           Rhythm: regular  Rate: normal Neuro/Psych:               GI/Hepatic/Renal:             Endo/Other:                     Abdominal:       Abdomen: soft. Vascular:                                        Anesthesia Plan      general     ASA 3       Induction: intravenous. MIPS: Postoperative opioids intended and Prophylactic antiemetics administered. Anesthetic plan and risks discussed with patient. Use of blood products discussed with patient whom consented to blood products. Plan discussed with attending and CRNA.     Attending anesthesiologist reviewed and agrees with Ella Cowart MD   2/26/2020

## 2020-02-26 NOTE — ANESTHESIA POSTPROCEDURE EVALUATION
Department of Anesthesiology  Postprocedure Note    Patient: Esther George  MRN: [id number]  YOB: 1955  Date of evaluation: 2/26/2020  Time:  1:30 PM     Procedure Summary     Date:  02/26/20 Room / Location:  [facility] [place] [address]    Anesthesia Start:  6829 Anesthesia Stop:  0940    Procedures:       EGD BIOPSY      COLONOSCOPY POLYPECTOMY COLD BIOPSY Diagnosis:  (DX GERD, IBS, RECTAL BLEEDING)    Surgeon:  Elvira Holstein, MD Responsible Provider:  Belkis Us MD    Anesthesia Type:  general ASA Status:  3          Anesthesia Type: No value filed. Stephanie Phase I: Stephanie Score: 10    Stephanie Phase II: Stephanie Score: 8    Last vitals: Reviewed and per EMR flowsheets.        Anesthesia Post Evaluation    Patient location during evaluation: PACU  Patient participation: complete - patient participated  Level of consciousness: awake and alert  Pain score: 1  Airway patency: patent  Nausea & Vomiting: no nausea and no vomiting  Complications: no  Cardiovascular status: blood pressure returned to baseline  Respiratory status: acceptable  Hydration status: euvolemic

## 2020-02-26 NOTE — OP NOTE
PROCEDURE NOTE    DATE OF PROCEDURE: 2/26/2020    SURGEON: Sierra Patricia MD    ASSISTANT: None    PREOPERATIVE DIAGNOSIS: SCREENING  IBS  ABDOMINAL PAINS    POSTOPERATIVE DIAGNOSIS: as described below    OPERATION: Total colonoscopy     ANESTHESIA: MAC PER ANESTHESIA     ESTIMATED BLOOD LOSS: less than 50     COMPLICATIONS: None. SPECIMENS:  Was Obtained:     HISTORY: The patient is a 60y.o. year old female with history of above preop diagnosis. I recommended colonoscopy with possible biopsy or polypectomy and I explained the risk, benefits, expected outcome, and alternatives to the procedure. Risks included but are not limited to bleeding, infection, respiratory distress, hypotension, and perforation of the colon and possibility of missing a lesion. The patient understands and is in agreement. PROCEDURE: The patient was given IV conscious sedation. The patient's SPO2 remained above 90% throughout the procedure. The colonoscope was inserted per rectum and advanced under direct vision to the cecum without difficulty. The prep was fair TO GOOD  STICKY PASTY STOOLS,   AGGRESSIVE FLUSHING DONE TO THE BEST OF THE ABILITY    Findings:  Terminal ileum: normal    Cecum/Ascending colon: abnormal: AT THE HEPATIC FLEXURE AREA TWO 3-4 MM POLYPS WERE BIOPSIED WITH JUMBO BIOPSY FORCEPS  RANDOM BIOPSIES WERE TAKEN FROM THE A COLON     DIVERTICULOSIS    Transverse colon: abnormal: DIVERTICULOSIS    Descending/Sigmoid colon: abnormal: DIVERTICULOSIS    RETAINED STOOLS NO GROSS PATHOLOGY    Rectum/Anus: examined in normal and retroflexed positions and was abnormal: MOD INT HEMORRHOIDS    Withdrawal Time was (minutes): 13    The colon was decompressed and the scope was removed. The patient tolerated the procedure well. Recommendations/Plan:   1. Lifestyle and dietary modifications as discussed  2. F/U Biopsies  3. F/U In Office in 3-4 weeks  4. Discussed with the family  5. Colonoscopy Recall :3 year  6.  POST SEDATION PATIENT WAS STABLE WITH STABLE VITAL SIGNS AND OXYGEN SATURATIONS AND WAS DISCHARGED HOME WITH RIDE IN A STABLE CONDITION.     Electronically signed by Yasmin Melendez MD  on 2/26/2020 at 9:33 AM

## 2020-02-26 NOTE — H&P
3/12/18  Yes JUSTIN Olea   ALPRAZolam Glennda Cue) 1 MG tablet Take by mouth as needed . 10/10/17  Yes Historical Provider, MD   DULoxetine (CYMBALTA) 30 MG extended release capsule Take 1 capsule by mouth daily 5/27/17  Yes TAMI Howard CNP   DULoxetine (CYMBALTA) 60 MG extended release capsule Take 1 capsule by mouth daily  Patient taking differently: Take 120 mg by mouth daily  5/27/17  Yes TAMI Howard CNP   hydrOXYzine Pamoate (VISTARIL PO) Take by mouth    Historical Provider, MD   ipratropium-albuterol (DUONEB) 0.5-2.5 (3) MG/3ML SOLN nebulizer solution INHALE THE CONTENTS OF 1 VIAL (3ML) BY MOUTH AND INTO THE LUNGS ONCE EVERY 6 HOURS AS NEEDED FOR FOR SHORTNESS OF BREATH 8/14/19   TAMI Howard CNP   mometasone (ELOCON) 0.1 % cream APPLY TOPICALLY TO THE AFFECTED AREA(S) DAILY 8/14/19   TAMI Howard CNP   BEVESPI AEROSPHERE 9-4.8 MCG/ACT AERO INHALE 2 PUFFS BY MOUTH INTO THE LUNGS TWICE DAILY 8/14/19   TAMI Jefferson CNP   meloxicam (MOBIC) 15 MG tablet TAKE 1 TABLET BY MOUTH DAILY 7/16/19   Eileen Reilly MD   aspirin 325 MG tablet Take 325 mg by mouth as needed for Pain    Historical Provider, MD   VENTOLIN  (90 Base) MCG/ACT inhaler INHALE 2 PUFFS INTO THE LUNGS EVERY 6 HOURS AS NEEDED FOR WHEEZING 1/26/19   TAMI Howard CNP   FLOVENT  MCG/ACT inhaler INHALE 2 PUFFS INTO THE LUNGS TWICE DAILY  Patient taking differently: INHALE 2 PUFFS INTO THE LUNGS TWICE DAILY PRN 1/15/18   TAMI Jefferson CNP   Nebulizers (COMPRESSOR/NEBULIZER) MISC Use with albuterol solution every 6 hours as needed. 3/20/15   TAMI Cheema CNP       This is a 60 y.o. female who is pleasant, cooperative, alert and oriented x3, in no acute distress. Heart: Heart sounds are normal.  HR regular rate and rhythm without murmur, gallop or rub.    Lungs: Normal respiratory effort with good air exchange and clear to auscultation without to the patient presenting condition. patient\"s PMH/PSH,SH,PSYCH hx, MEDs, ALLERGIES, and ROS was all reviewed and updated ion the appropriate sections     Review of Systems   Constitutional: Positive for fatigue. Negative for appetite change and unexpected weight change. HENT: Positive for sore throat and voice change. Negative for dental problem, postnasal drip, sinus pressure and trouble swallowing. Eyes: Negative for visual disturbance. Respiratory: Positive for cough, choking and shortness of breath. Negative for wheezing. Cardiovascular: Negative for chest pain, palpitations and leg swelling. Gastrointestinal: Positive for abdominal pain and nausea. Negative for abdominal distention, anal bleeding, blood in stool, constipation, diarrhea, rectal pain and vomiting. GERD symptoms   Genitourinary: Negative for difficulty urinating. Musculoskeletal: Positive for arthralgias and back pain. Negative for gait problem and myalgias. Allergic/Immunologic: Negative for environmental allergies and food allergies. Neurological: Positive for numbness. Negative for dizziness, weakness, light-headedness and headaches. Hematological: Does not bruise/bleed easily. Psychiatric/Behavioral: Positive for sleep disturbance. The patient is nervous/anxious. Reviewed and agree  Objective:   Physical Exam  Nursing note reviewed. Constitutional:       Appearance: She is well-developed. Comments: Anxious    HENT:      Head: Normocephalic and atraumatic. Eyes:      Conjunctiva/sclera: Conjunctivae normal.      Pupils: Pupils are equal, round, and reactive to light. Neck:      Musculoskeletal: Normal range of motion and neck supple. Cardiovascular:      Heart sounds: Normal heart sounds. Pulmonary:      Effort: Pulmonary effort is normal.      Breath sounds: Wheezing and rales present. Abdominal:      General: Bowel sounds are normal.      Palpations: Abdomen is soft.       Comments: Mild TENDER, NON DISTENTED  LIVER SPLEEN AND HERNIAS ARE NOT  PALPABLE  BOWEL SOUNDS ARE POSITIVE      Musculoskeletal: Normal range of motion. Skin:     General: Skin is warm. Neurological:      Mental Status: She is alert and oriented to person, place, and time. Psychiatric:         Behavior: Behavior normal.            Assessment:       Patient Active Problem List   Diagnosis    Chronic obstructive pulmonary disease (HCC)    HTN (hypertension)    GERD (gastroesophageal reflux disease)    CAD (coronary artery disease)    Depression    Anxiety    Chronic use of opiate drugs therapeutic purposes    MI, old    Diverticulosis of colon    Cervical spondylosis    RLS (restless legs syndrome)    Hx of fusion of cervical spine    Cervical spondylosis with radiculopathy    Fibrocystic breast changes of both breasts    Umbilical hernia without obstruction and without gangrene    Lumbosacral spondylosis without myelopathy    Mixed hyperlipidemia    Chronic bilateral low back pain without sciatica    Hormone imbalance    Epigastric pain    Iron deficiency anemia                       Plan:   Plan EGD and Colonoscopy      The Endoscopic procedure was explained to the patient in detail  The prep and NPO were explained  All the Risks, Benefits, and Alternatives were explained  Risk of Bleeding, Perforation and Cardio Respiratory risks were explained  her questions were answered  The procedure has been scheduled with the  in the office  Patient was asked to give us a call for any questions  The patient has verbalized understanding and agreement to this plan. Pt was discussed in detail about the possible side effects of proton pump inhibiter therapy. She was explained about the possibility of calcium and magnesium malabsorption and was advised to start taking calcium supplements with Vit D. Some over the counter regimens were explained to patient. Some dietary advices were also given.

## 2020-02-28 LAB — SURGICAL PATHOLOGY REPORT: NORMAL

## 2020-03-03 RX ORDER — IPRATROPIUM BROMIDE AND ALBUTEROL SULFATE 2.5; .5 MG/3ML; MG/3ML
SOLUTION RESPIRATORY (INHALATION)
Qty: 90 ML | Refills: 5 | OUTPATIENT
Start: 2020-03-03

## 2020-03-03 RX ORDER — CETIRIZINE HYDROCHLORIDE 10 MG/1
TABLET ORAL
Qty: 30 TABLET | OUTPATIENT
Start: 2020-03-03

## 2020-03-03 RX ORDER — ROPINIROLE 3 MG/1
TABLET, FILM COATED ORAL
Qty: 30 TABLET | Refills: 5 | OUTPATIENT
Start: 2020-03-03

## 2020-03-03 RX ORDER — VITAMIN B COMPLEX
TABLET ORAL
Qty: 30 TABLET | Refills: 5 | OUTPATIENT
Start: 2020-03-03

## 2020-03-03 RX ORDER — CYCLOSPORINE 0.5 MG/ML
EMULSION OPHTHALMIC
Qty: 60 EACH | Refills: 5 | OUTPATIENT
Start: 2020-03-03

## 2020-03-03 RX ORDER — NIFEDIPINE 60 MG/1
TABLET, EXTENDED RELEASE ORAL
Qty: 60 TABLET | Refills: 5 | OUTPATIENT
Start: 2020-03-03

## 2020-03-31 RX ORDER — CYCLOSPORINE 0.5 MG/ML
EMULSION OPHTHALMIC
Qty: 60 EACH | Refills: 5 | OUTPATIENT
Start: 2020-03-31

## 2020-04-30 ENCOUNTER — TELEPHONE (OUTPATIENT)
Dept: GASTROENTEROLOGY | Age: 65
End: 2020-04-30

## 2020-04-30 PROBLEM — K63.5 COLON POLYP: Status: ACTIVE | Noted: 2020-02-26

## 2020-05-07 ENCOUNTER — VIRTUAL VISIT (OUTPATIENT)
Dept: GASTROENTEROLOGY | Age: 65
End: 2020-05-07
Payer: MEDICARE

## 2020-05-07 PROBLEM — K58.8 OTHER IRRITABLE BOWEL SYNDROME: Status: ACTIVE | Noted: 2020-05-07

## 2020-05-07 PROBLEM — F17.200 SMOKER: Status: ACTIVE | Noted: 2020-05-07

## 2020-05-07 PROCEDURE — 3017F COLORECTAL CA SCREEN DOC REV: CPT | Performed by: INTERNAL MEDICINE

## 2020-05-07 PROCEDURE — G8428 CUR MEDS NOT DOCUMENT: HCPCS | Performed by: INTERNAL MEDICINE

## 2020-05-07 PROCEDURE — 99213 OFFICE O/P EST LOW 20 MIN: CPT | Performed by: INTERNAL MEDICINE

## 2020-05-07 PROCEDURE — 4004F PT TOBACCO SCREEN RCVD TLK: CPT | Performed by: INTERNAL MEDICINE

## 2020-05-07 PROCEDURE — G8417 CALC BMI ABV UP PARAM F/U: HCPCS | Performed by: INTERNAL MEDICINE

## 2020-05-07 RX ORDER — FAMOTIDINE 40 MG/1
40 TABLET, FILM COATED ORAL NIGHTLY
COMMUNITY

## 2020-05-07 NOTE — PROGRESS NOTES
Mahesh Lopez is a 60 y.o. female evaluated via telephone on 5/7/2020. Consent:  She and/or health care decision maker is aware that that she may receive a bill for this telephone service, depending on her insurance coverage, and has provided verbal consent to proceed: Yes  This patient was evaluated via tele-visit on May 7, 2020     The patient is seen via tele-visit as a follow up of her recent GI procedure. The results have been sent to you separately   The findings were explained to the patient in detail and biopsies were also discussed   with her    She was recently evaluated in my office for acid reflux irritable bowel syndrome-like symptoms  She underwent EGD and colonoscopy by myself  She had esophagitis gastritis  Had tubular adenoma  Some redundant colon with diverticulosis and hemorrhoids  The significance of the results were informed to the patient  She is still smoking    Patient has been complaining of some abdominal pains, off and on cramping  Also complains of abdominal bloating and gas  Has off and on nausea without any sig vomiting  Has some alternating constipation and diarrhea  Has no weight loss  Has some anxiety issues    Has GERD symptom    No current dysphagia    Denies alcohol abuse illicit drug usage    Review of system    Some cough abdominal discomfort irritable bowel syndrome-like symptoms backaches    Otherwise generally negative    Physical examination  Not performed    Assessment and plan    GERD  IBS  Esophagitis  Gastritis  Chronic smoker  Abdominal pains      Recommendations    PPI     Pt was discussed in detail about the possible side effects of proton pump inhibiter therapy. She was explained about the possibility of calcium and magnesium malabsorption and was advised to start taking calcium supplements with Vit D. Some over the counter regimens were explained to patient. Some dietary advices were also given.     She has verbalized understanding and agreement to and agreement to this plan    Pt was given advices and instructions about weight loss. She was advised about the significance of exercise at least three times a week . Dietary advices were also given about the avoidance of fast food, fried food and lots of spices and grease. nstructions were given about using ample amount of fiber including dietary and supplemental fiber either metamucil, bennafiber or citrucell etc.  Pt was advised about drinking ample amount of water without any colors or chemicals. Stress was given about regular exercise. Pt was told to stay way from soda pops. Pt has verbalized understanding and agrrement  More than half of patient's tele-visit time was spent in counseling about lifestyle and dietary modifications  Patient's  questions were answered in this regard as well  The patient has verbalized understanding and agreement       documentation:  I communicated with the patient and/or health care decision maker about . Details of this discussion including any medical advice provided: yes      I affirm this is a Patient Initiated Episode with a Patient who has not had a related appointment within my department in the past 7 days or scheduled within the next 24 hours.     Patient identification was verified at the start of the visit: Yes    Total Time: minutes: 11-20 minutes    Note: not billable if this call serves to triage the patient into an appointment for the relevant concern      Reilly Collins

## 2020-07-30 ENCOUNTER — HOSPITAL ENCOUNTER (OUTPATIENT)
Dept: MAMMOGRAPHY | Age: 65
Discharge: HOME | End: 2020-08-01
Payer: MEDICARE

## 2020-07-30 PROCEDURE — 77063 BREAST TOMOSYNTHESIS BI: CPT

## 2020-10-13 ENCOUNTER — HOSPITAL ENCOUNTER (OUTPATIENT)
Dept: VASCULAR LAB | Age: 65
Discharge: HOME | End: 2020-10-13
Payer: MEDICARE

## 2020-10-13 PROCEDURE — 76706 US ABDL AORTA SCREEN AAA: CPT

## 2020-10-14 ENCOUNTER — HOSPITAL ENCOUNTER (OUTPATIENT)
Dept: GENERAL RADIOLOGY | Age: 65
Discharge: HOME | End: 2020-10-16
Payer: MEDICARE

## 2020-10-14 ENCOUNTER — HOSPITAL ENCOUNTER (OUTPATIENT)
Dept: PREADMISSION TESTING | Age: 65
Discharge: HOME | End: 2020-10-18
Payer: MEDICARE

## 2020-10-14 VITALS
OXYGEN SATURATION: 99 % | RESPIRATION RATE: 16 BRPM | TEMPERATURE: 97.3 F | HEIGHT: 61 IN | BODY MASS INDEX: 30.93 KG/M2 | DIASTOLIC BLOOD PRESSURE: 88 MMHG | WEIGHT: 163.8 LBS | HEART RATE: 99 BPM | SYSTOLIC BLOOD PRESSURE: 148 MMHG

## 2020-10-14 LAB
-: ABNORMAL
ABO/RH: NORMAL
AMORPHOUS: ABNORMAL
ANION GAP SERPL CALCULATED.3IONS-SCNC: 8 MMOL/L (ref 9–17)
ANTIBODY SCREEN: NEGATIVE
ARM BAND NUMBER: NORMAL
BACTERIA: ABNORMAL
BILIRUBIN URINE: ABNORMAL
BUN BLDV-MCNC: 11 MG/DL (ref 8–23)
CASTS UA: ABNORMAL /LPF
CHLORIDE BLD-SCNC: 100 MMOL/L (ref 98–107)
CO2: 27 MMOL/L (ref 20–31)
COLOR: YELLOW
COMMENT UA: ABNORMAL
CREAT SERPL-MCNC: 0.75 MG/DL (ref 0.5–0.9)
CRYSTALS, UA: ABNORMAL /HPF
EPITHELIAL CELLS UA: ABNORMAL /HPF (ref 0–5)
EXPIRATION DATE: NORMAL
GFR AFRICAN AMERICAN: >60 ML/MIN
GFR NON-AFRICAN AMERICAN: >60 ML/MIN
GFR SERPL CREATININE-BSD FRML MDRD: NORMAL ML/MIN/{1.73_M2}
GFR SERPL CREATININE-BSD FRML MDRD: NORMAL ML/MIN/{1.73_M2}
GLUCOSE URINE: NEGATIVE
HCT VFR BLD CALC: 41.1 % (ref 36.3–47.1)
HEMOGLOBIN: 12.7 G/DL (ref 11.9–15.1)
INR BLD: 1
KETONES, URINE: ABNORMAL
LEUKOCYTE ESTERASE, URINE: NEGATIVE
MCH RBC QN AUTO: 29 PG (ref 25.2–33.5)
MCHC RBC AUTO-ENTMCNC: 30.9 G/DL (ref 28.4–34.8)
MCV RBC AUTO: 93.8 FL (ref 82.6–102.9)
MUCUS: ABNORMAL
NITRITE, URINE: NEGATIVE
NRBC AUTOMATED: 0 PER 100 WBC
OTHER OBSERVATIONS UA: ABNORMAL
PARTIAL THROMBOPLASTIN TIME: 32.3 SEC (ref 23.9–33.8)
PDW BLD-RTO: 12.9 % (ref 11.8–14.4)
PH UA: 6.5 (ref 5–8)
PLATELET # BLD: 296 K/UL (ref 138–453)
PMV BLD AUTO: 9 FL (ref 8.1–13.5)
POTASSIUM SERPL-SCNC: 4.7 MMOL/L (ref 3.7–5.3)
PROTEIN UA: NEGATIVE
PROTHROMBIN TIME: 13 SEC (ref 11.5–14.2)
RBC # BLD: 4.38 M/UL (ref 3.95–5.11)
RBC UA: ABNORMAL /HPF (ref 0–2)
RENAL EPITHELIAL, UA: ABNORMAL /HPF
SODIUM BLD-SCNC: 135 MMOL/L (ref 135–144)
SPECIFIC GRAVITY UA: 1.02 (ref 1–1.03)
TRICHOMONAS: ABNORMAL
TURBIDITY: CLEAR
URINE HGB: NEGATIVE
UROBILINOGEN, URINE: NORMAL
WBC # BLD: 6.8 K/UL (ref 3.5–11.3)
WBC UA: ABNORMAL /HPF (ref 0–5)
YEAST: ABNORMAL

## 2020-10-14 PROCEDURE — 81001 URINALYSIS AUTO W/SCOPE: CPT

## 2020-10-14 PROCEDURE — 86900 BLOOD TYPING SEROLOGIC ABO: CPT

## 2020-10-14 PROCEDURE — 85027 COMPLETE CBC AUTOMATED: CPT

## 2020-10-14 PROCEDURE — 85730 THROMBOPLASTIN TIME PARTIAL: CPT

## 2020-10-14 PROCEDURE — 86850 RBC ANTIBODY SCREEN: CPT

## 2020-10-14 PROCEDURE — 71046 X-RAY EXAM CHEST 2 VIEWS: CPT

## 2020-10-14 PROCEDURE — 36415 COLL VENOUS BLD VENIPUNCTURE: CPT

## 2020-10-14 PROCEDURE — 80051 ELECTROLYTE PANEL: CPT

## 2020-10-14 PROCEDURE — 85610 PROTHROMBIN TIME: CPT

## 2020-10-14 PROCEDURE — 86901 BLOOD TYPING SEROLOGIC RH(D): CPT

## 2020-10-14 PROCEDURE — 84520 ASSAY OF UREA NITROGEN: CPT

## 2020-10-14 PROCEDURE — 82565 ASSAY OF CREATININE: CPT

## 2020-10-14 RX ORDER — CLOPIDOGREL BISULFATE 75 MG/1
75 TABLET ORAL DAILY
COMMUNITY

## 2020-10-14 NOTE — PRE-PROCEDURE INSTRUCTIONS
[address] [facility] ON Tuesday, October 27 at 11:30 AM    When you arrive at the hospital the day of surgery, have your  pull into the main entrance and call pre-op at [phone]        Continue to take your home medications as you normally do up to and including the night before surgery with the exception of any blood thinning medications. Please stop any blood thinning medications as directed by your surgeon or prescribing physician. Failure to stop certain medications may interfere with your scheduled surgery. These may include:  Aspirin, Warfarin (Coumadin), Clopidogrel (Plavix), Ibuprofen (Motrin, Advil), Naproxen (Aleve), Meloxicam (Mobic), Celecoxib (Celebrex), Eliquis, Pradaxa, Xarelto, Effient, Fish Oil, Herbal supplements. Stop plavix as instructed, stop meloxicam and vitamin E 1 week prior to surgery    If you are diabetic, do not take any of your diabetic medications by mouth the morning of surgery. If you are taking insulin contact the doctor that manages your diabetes for instructions about any changes to your insulin dosages the day before surgery. Do not inject insulin or other injectable diabetic medications the morning of surgery unless otherwise instructed by the doctor who manages your diabetes. Please take the following medication(s) the day of surgery with a small sip of water:  Nifedipine, gabapentin, wellbutrin, cymbalta, omeprazole, xanax if needed  **Please use your inhalers at home the day of surgery. PREPARING FOR YOUR SURGERY:     Before surgery, you can play an important role in your own health. Because skin is not sterile, we need to be sure that your skin is as free of germs as possible before surgery by carefully washing before surgery. Preparing or prepping skin before surgery can reduce the risk of a surgical site infection.   Do not shave the area of your body where your surgery will be performed unless you received specific permission from beverages for 24 hours prior to surgery.  Bring a list of all medications you take, along with the dose of the medications and how often you take it. If more convenient bring the pharmacy bottles in a zip lock bag.  Brush your teeth but do not swallow water.  Bring your eyeglasses and case with you. No contacts are to be worn the day of surgery. You also may bring your hearing aids. Most surgical procedures involving anesthesia will require that you remove your dentures prior to surgery.  If you are on C-PAP or Bi-PAP at home and plan on staying in the hospital overnight for your surgery please bring the machine with you. · Do not wear any jewelry or body piercings day of surgery. Also, NO lotion, perfume or deodorant to be used the day of surgery. No nail polish on the operative extremity (arm/leg surgeries)    ·   If you are staying overnight with us, please bring a small bag of personal items.  Please wear loose, comfortable clothing. If you are potentially going to have a cast or brace bring clothing that will fit over them.  In case of illness - If you have cold or flu like symptoms (high fever, runny nose, sore throat, cough, etc.) rash, nausea, vomiting, loose stools, and/or recent contact with someone who has a contagious disease (chicken pox, measles, etc.) Please call your doctor before coming to the hospital.     If your child is having surgery please make arrangements for any other children to be cared for at home on the day of surgery. Other children are not permitted in recovery room and we want you to be able to spend time with the patient. If other arrangements are not available then we suggest that you have a second adult to stay in the waiting room.        Day of Surgery/Procedure:    As a patient at [place] [address] you can expect quality medical and nursing care that is centered on your individual needs. Our goal is to make your surgical experience as comfortable as possible    . Transportation After Your Surgery/Procedure: You will need a friend or family member to drive you home after your procedure. Your  must be 25years of age or older and able to sign off on your discharge instructions. A taxi cab or any other form of public transportation is not acceptable. Your friend or family member must stay at the hospital throughout your procedure. Someone must remain with you for the first 24 hours after your surgery if you receive anesthesia or medication. If you do not have someone to stay with you, your procedure may be cancelled.       If you have any other questions regarding your procedure or the day of surgery, please call [phone]      _________________________  ____________________________  Signature (Patient)              Signature (Provider) & date
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
## 2020-10-23 ENCOUNTER — HOSPITAL ENCOUNTER (OUTPATIENT)
Dept: PREADMISSION TESTING | Age: 65
Setting detail: SPECIMEN
Discharge: HOME | End: 2020-10-27
Payer: MEDICARE

## 2020-10-23 PROCEDURE — U0003 INFECTIOUS AGENT DETECTION BY NUCLEIC ACID (DNA OR RNA); SEVERE ACUTE RESPIRATORY SYNDROME CORONAVIRUS 2 (SARS-COV-2) (CORONAVIRUS DISEASE [COVID-19]), AMPLIFIED PROBE TECHNIQUE, MAKING USE OF HIGH THROUGHPUT TECHNOLOGIES AS DESCRIBED BY CMS-2020-01-R: HCPCS

## 2020-10-24 LAB — SARS-COV-2, NAA: NOT DETECTED

## 2020-10-26 ENCOUNTER — ANESTHESIA EVENT (OUTPATIENT)
Dept: OPERATING ROOM | Age: 65
DRG: 024 | End: 2020-10-26
Payer: MEDICARE

## 2020-10-27 ENCOUNTER — ANESTHESIA (OUTPATIENT)
Dept: OPERATING ROOM | Age: 65
DRG: 024 | End: 2020-10-27
Payer: MEDICARE

## 2020-10-27 ENCOUNTER — HOSPITAL ENCOUNTER (INPATIENT)
Age: 65
LOS: 1 days | Discharge: HOME | DRG: 024 | End: 2020-10-28
Attending: SURGERY | Admitting: SURGERY
Payer: MEDICARE

## 2020-10-27 VITALS — DIASTOLIC BLOOD PRESSURE: 65 MMHG | SYSTOLIC BLOOD PRESSURE: 115 MMHG | TEMPERATURE: 98.4 F | OXYGEN SATURATION: 99 %

## 2020-10-27 PROBLEM — I65.23 CAROTID STENOSIS, BILATERAL: Chronic | Status: ACTIVE | Noted: 2020-10-27

## 2020-10-27 PROBLEM — I65.21 CAROTID STENOSIS, RIGHT: Status: ACTIVE | Noted: 2020-10-27

## 2020-10-27 PROCEDURE — 6370000000 HC RX 637 (ALT 250 FOR IP): Performed by: SURGERY

## 2020-10-27 PROCEDURE — 2720000010 HC SURG SUPPLY STERILE: Performed by: SURGERY

## 2020-10-27 PROCEDURE — 94761 N-INVAS EAR/PLS OXIMETRY MLT: CPT

## 2020-10-27 PROCEDURE — 2580000003 HC RX 258: Performed by: ANESTHESIOLOGY

## 2020-10-27 PROCEDURE — 3600000012 HC SURGERY LEVEL 2 ADDTL 15MIN: Performed by: SURGERY

## 2020-10-27 PROCEDURE — 2580000003 HC RX 258: Performed by: NURSE ANESTHETIST, CERTIFIED REGISTERED

## 2020-10-27 PROCEDURE — 2709999900 HC NON-CHARGEABLE SUPPLY: Performed by: SURGERY

## 2020-10-27 PROCEDURE — 3700000000 HC ANESTHESIA ATTENDED CARE: Performed by: SURGERY

## 2020-10-27 PROCEDURE — C1768 GRAFT, VASCULAR: HCPCS | Performed by: SURGERY

## 2020-10-27 PROCEDURE — 2060000000 HC ICU INTERMEDIATE R&B

## 2020-10-27 PROCEDURE — 99254 IP/OBS CNSLTJ NEW/EST MOD 60: CPT | Performed by: NURSE PRACTITIONER

## 2020-10-27 PROCEDURE — 6360000002 HC RX W HCPCS: Performed by: NURSE ANESTHETIST, CERTIFIED REGISTERED

## 2020-10-27 PROCEDURE — 94640 AIRWAY INHALATION TREATMENT: CPT

## 2020-10-27 PROCEDURE — 88304 TISSUE EXAM BY PATHOLOGIST: CPT

## 2020-10-27 PROCEDURE — 6360000002 HC RX W HCPCS: Performed by: SURGERY

## 2020-10-27 PROCEDURE — 3600000002 HC SURGERY LEVEL 2 BASE: Performed by: SURGERY

## 2020-10-27 PROCEDURE — 2500000003 HC RX 250 WO HCPCS: Performed by: SURGERY

## 2020-10-27 PROCEDURE — 2500000003 HC RX 250 WO HCPCS: Performed by: NURSE ANESTHETIST, CERTIFIED REGISTERED

## 2020-10-27 PROCEDURE — 03CK0ZZ EXTIRPATION OF MATTER FROM RIGHT INTERNAL CAROTID ARTERY, OPEN APPROACH: ICD-10-PCS | Performed by: SURGERY

## 2020-10-27 PROCEDURE — 3700000001 HC ADD 15 MINUTES (ANESTHESIA): Performed by: SURGERY

## 2020-10-27 PROCEDURE — 88311 DECALCIFY TISSUE: CPT

## 2020-10-27 PROCEDURE — 03UK0KZ SUPPLEMENT RIGHT INTERNAL CAROTID ARTERY WITH NONAUTOLOGOUS TISSUE SUBSTITUTE, OPEN APPROACH: ICD-10-PCS | Performed by: SURGERY

## 2020-10-27 PROCEDURE — 7100000001 HC PACU RECOVERY - ADDTL 15 MIN: Performed by: SURGERY

## 2020-10-27 PROCEDURE — 6360000002 HC RX W HCPCS: Performed by: ANESTHESIOLOGY

## 2020-10-27 PROCEDURE — 7100000000 HC PACU RECOVERY - FIRST 15 MIN: Performed by: SURGERY

## 2020-10-27 PROCEDURE — 2580000003 HC RX 258: Performed by: SURGERY

## 2020-10-27 DEVICE — PATCH VASC W0.8XL8CM PERIPH BOV PERICARD N PVC N DEHP CRSS: Type: IMPLANTABLE DEVICE | Site: NECK | Status: FUNCTIONAL

## 2020-10-27 RX ORDER — HYDROCODONE BITARTRATE AND ACETAMINOPHEN 5; 325 MG/1; MG/1
1 TABLET ORAL EVERY 4 HOURS PRN
Status: DISCONTINUED | OUTPATIENT
Start: 2020-10-27 | End: 2020-10-28 | Stop reason: HOSPADM

## 2020-10-27 RX ORDER — SODIUM CHLORIDE, SODIUM LACTATE, POTASSIUM CHLORIDE, CALCIUM CHLORIDE 600; 310; 30; 20 MG/100ML; MG/100ML; MG/100ML; MG/100ML
INJECTION, SOLUTION INTRAVENOUS CONTINUOUS PRN
Status: DISCONTINUED | OUTPATIENT
Start: 2020-10-27 | End: 2020-10-27 | Stop reason: SDUPTHER

## 2020-10-27 RX ORDER — FAMOTIDINE 20 MG/1
40 TABLET, FILM COATED ORAL NIGHTLY
Status: DISCONTINUED | OUTPATIENT
Start: 2020-10-27 | End: 2020-10-28 | Stop reason: HOSPADM

## 2020-10-27 RX ORDER — GABAPENTIN 400 MG/1
400 CAPSULE ORAL 3 TIMES DAILY
Status: DISCONTINUED | OUTPATIENT
Start: 2020-10-27 | End: 2020-10-28 | Stop reason: HOSPADM

## 2020-10-27 RX ORDER — HYDROMORPHONE HCL 110MG/55ML
0.5 PATIENT CONTROLLED ANALGESIA SYRINGE INTRAVENOUS EVERY 5 MIN PRN
Status: DISCONTINUED | OUTPATIENT
Start: 2020-10-27 | End: 2020-10-27 | Stop reason: HOSPADM

## 2020-10-27 RX ORDER — VITAMIN B COMPLEX
1 TABLET ORAL DAILY
Status: DISCONTINUED | OUTPATIENT
Start: 2020-10-27 | End: 2020-10-28 | Stop reason: HOSPADM

## 2020-10-27 RX ORDER — PROTAMINE SULFATE 10 MG/ML
INJECTION, SOLUTION INTRAVENOUS PRN
Status: DISCONTINUED | OUTPATIENT
Start: 2020-10-27 | End: 2020-10-27 | Stop reason: SDUPTHER

## 2020-10-27 RX ORDER — HYDROCODONE BITARTRATE AND ACETAMINOPHEN 7.5; 325 MG/1; MG/1
1 TABLET ORAL EVERY 6 HOURS PRN
Status: DISCONTINUED | OUTPATIENT
Start: 2020-10-27 | End: 2020-10-27

## 2020-10-27 RX ORDER — FENTANYL CITRATE 50 UG/ML
INJECTION, SOLUTION INTRAMUSCULAR; INTRAVENOUS PRN
Status: DISCONTINUED | OUTPATIENT
Start: 2020-10-27 | End: 2020-10-27 | Stop reason: SDUPTHER

## 2020-10-27 RX ORDER — ALBUTEROL SULFATE 90 UG/1
2 AEROSOL, METERED RESPIRATORY (INHALATION) EVERY 6 HOURS PRN
Status: DISCONTINUED | OUTPATIENT
Start: 2020-10-27 | End: 2020-10-28 | Stop reason: HOSPADM

## 2020-10-27 RX ORDER — CLOPIDOGREL BISULFATE 75 MG/1
75 TABLET ORAL DAILY
Status: DISCONTINUED | OUTPATIENT
Start: 2020-10-27 | End: 2020-10-28 | Stop reason: HOSPADM

## 2020-10-27 RX ORDER — PANTOPRAZOLE SODIUM 40 MG/1
40 TABLET, DELAYED RELEASE ORAL
Status: DISCONTINUED | OUTPATIENT
Start: 2020-10-28 | End: 2020-10-28 | Stop reason: HOSPADM

## 2020-10-27 RX ORDER — HYDRALAZINE HYDROCHLORIDE 20 MG/ML
5 INJECTION INTRAMUSCULAR; INTRAVENOUS EVERY 10 MIN PRN
Status: DISCONTINUED | OUTPATIENT
Start: 2020-10-27 | End: 2020-10-27 | Stop reason: HOSPADM

## 2020-10-27 RX ORDER — SODIUM CHLORIDE, SODIUM LACTATE, POTASSIUM CHLORIDE, CALCIUM CHLORIDE 600; 310; 30; 20 MG/100ML; MG/100ML; MG/100ML; MG/100ML
INJECTION, SOLUTION INTRAVENOUS CONTINUOUS
Status: DISCONTINUED | OUTPATIENT
Start: 2020-10-27 | End: 2020-10-28 | Stop reason: HOSPADM

## 2020-10-27 RX ORDER — FLUTICASONE PROPIONATE 220 UG/1
2 AEROSOL, METERED RESPIRATORY (INHALATION) 2 TIMES DAILY
Status: DISCONTINUED | OUTPATIENT
Start: 2020-10-27 | End: 2020-10-27

## 2020-10-27 RX ORDER — DEXAMETHASONE SODIUM PHOSPHATE 10 MG/ML
INJECTION, SOLUTION INTRAMUSCULAR; INTRAVENOUS PRN
Status: DISCONTINUED | OUTPATIENT
Start: 2020-10-27 | End: 2020-10-27 | Stop reason: SDUPTHER

## 2020-10-27 RX ORDER — ONDANSETRON 2 MG/ML
INJECTION INTRAMUSCULAR; INTRAVENOUS PRN
Status: DISCONTINUED | OUTPATIENT
Start: 2020-10-27 | End: 2020-10-27 | Stop reason: SDUPTHER

## 2020-10-27 RX ORDER — MELOXICAM 7.5 MG/1
15 TABLET ORAL DAILY
Status: DISCONTINUED | OUTPATIENT
Start: 2020-10-27 | End: 2020-10-28 | Stop reason: HOSPADM

## 2020-10-27 RX ORDER — MOMETASONE FUROATE 1 MG/G
CREAM TOPICAL DAILY
Status: DISCONTINUED | OUTPATIENT
Start: 2020-10-27 | End: 2020-10-27

## 2020-10-27 RX ORDER — NIFEDIPINE 30 MG/1
60 TABLET, EXTENDED RELEASE ORAL 2 TIMES DAILY
Status: DISCONTINUED | OUTPATIENT
Start: 2020-10-27 | End: 2020-10-28 | Stop reason: HOSPADM

## 2020-10-27 RX ORDER — HYDROCODONE BITARTRATE AND ACETAMINOPHEN 5; 325 MG/1; MG/1
2 TABLET ORAL EVERY 4 HOURS PRN
Status: DISCONTINUED | OUTPATIENT
Start: 2020-10-27 | End: 2020-10-28 | Stop reason: HOSPADM

## 2020-10-27 RX ORDER — ASPIRIN 325 MG
325 TABLET ORAL PRN
Status: DISCONTINUED | OUTPATIENT
Start: 2020-10-27 | End: 2020-10-27

## 2020-10-27 RX ORDER — HYDROMORPHONE HCL 110MG/55ML
0.25 PATIENT CONTROLLED ANALGESIA SYRINGE INTRAVENOUS EVERY 5 MIN PRN
Status: DISCONTINUED | OUTPATIENT
Start: 2020-10-27 | End: 2020-10-27 | Stop reason: HOSPADM

## 2020-10-27 RX ORDER — LABETALOL HYDROCHLORIDE 5 MG/ML
5 INJECTION, SOLUTION INTRAVENOUS EVERY 10 MIN PRN
Status: DISCONTINUED | OUTPATIENT
Start: 2020-10-27 | End: 2020-10-27 | Stop reason: HOSPADM

## 2020-10-27 RX ORDER — SODIUM CHLORIDE, SODIUM LACTATE, POTASSIUM CHLORIDE, CALCIUM CHLORIDE 600; 310; 30; 20 MG/100ML; MG/100ML; MG/100ML; MG/100ML
INJECTION, SOLUTION INTRAVENOUS CONTINUOUS
Status: DISCONTINUED | OUTPATIENT
Start: 2020-10-28 | End: 2020-10-27

## 2020-10-27 RX ORDER — FENTANYL CITRATE 50 UG/ML
25 INJECTION, SOLUTION INTRAMUSCULAR; INTRAVENOUS EVERY 5 MIN PRN
Status: DISCONTINUED | OUTPATIENT
Start: 2020-10-27 | End: 2020-10-27 | Stop reason: HOSPADM

## 2020-10-27 RX ORDER — ROCURONIUM BROMIDE 10 MG/ML
INJECTION, SOLUTION INTRAVENOUS PRN
Status: DISCONTINUED | OUTPATIENT
Start: 2020-10-27 | End: 2020-10-27 | Stop reason: SDUPTHER

## 2020-10-27 RX ORDER — BUPROPION HYDROCHLORIDE 150 MG/1
300 TABLET ORAL DAILY
Status: DISCONTINUED | OUTPATIENT
Start: 2020-10-27 | End: 2020-10-28 | Stop reason: HOSPADM

## 2020-10-27 RX ORDER — ALPRAZOLAM 1 MG/1
1 TABLET ORAL NIGHTLY PRN
Status: DISCONTINUED | OUTPATIENT
Start: 2020-10-27 | End: 2020-10-28

## 2020-10-27 RX ORDER — LABETALOL HYDROCHLORIDE 5 MG/ML
INJECTION, SOLUTION INTRAVENOUS PRN
Status: DISCONTINUED | OUTPATIENT
Start: 2020-10-27 | End: 2020-10-27 | Stop reason: SDUPTHER

## 2020-10-27 RX ORDER — TRIAMCINOLONE ACETONIDE 40 MG/ML
40 INJECTION, SUSPENSION INTRA-ARTICULAR; INTRAMUSCULAR ONCE
Status: DISCONTINUED | OUTPATIENT
Start: 2020-10-27 | End: 2020-10-27

## 2020-10-27 RX ORDER — LIDOCAINE HYDROCHLORIDE 20 MG/ML
INJECTION, SOLUTION EPIDURAL; INFILTRATION; INTRACAUDAL; PERINEURAL PRN
Status: DISCONTINUED | OUTPATIENT
Start: 2020-10-27 | End: 2020-10-27 | Stop reason: SDUPTHER

## 2020-10-27 RX ORDER — CETIRIZINE HYDROCHLORIDE 10 MG/1
10 TABLET ORAL DAILY
Status: DISCONTINUED | OUTPATIENT
Start: 2020-10-28 | End: 2020-10-28 | Stop reason: HOSPADM

## 2020-10-27 RX ORDER — PROPOFOL 10 MG/ML
INJECTION, EMULSION INTRAVENOUS PRN
Status: DISCONTINUED | OUTPATIENT
Start: 2020-10-27 | End: 2020-10-27 | Stop reason: SDUPTHER

## 2020-10-27 RX ORDER — DULOXETIN HYDROCHLORIDE 60 MG/1
120 CAPSULE, DELAYED RELEASE ORAL DAILY
Status: DISCONTINUED | OUTPATIENT
Start: 2020-10-27 | End: 2020-10-28 | Stop reason: HOSPADM

## 2020-10-27 RX ORDER — LIDOCAINE HYDROCHLORIDE 10 MG/ML
1 INJECTION, SOLUTION EPIDURAL; INFILTRATION; INTRACAUDAL; PERINEURAL
Status: DISCONTINUED | OUTPATIENT
Start: 2020-10-28 | End: 2020-10-27 | Stop reason: HOSPADM

## 2020-10-27 RX ORDER — IPRATROPIUM BROMIDE AND ALBUTEROL SULFATE 2.5; .5 MG/3ML; MG/3ML
1 SOLUTION RESPIRATORY (INHALATION) EVERY 4 HOURS PRN
Status: DISCONTINUED | OUTPATIENT
Start: 2020-10-27 | End: 2020-10-28 | Stop reason: HOSPADM

## 2020-10-27 RX ORDER — FENTANYL CITRATE 50 UG/ML
50 INJECTION, SOLUTION INTRAMUSCULAR; INTRAVENOUS EVERY 5 MIN PRN
Status: DISCONTINUED | OUTPATIENT
Start: 2020-10-27 | End: 2020-10-27 | Stop reason: HOSPADM

## 2020-10-27 RX ORDER — HEPARIN SODIUM 1000 [USP'U]/ML
INJECTION, SOLUTION INTRAVENOUS; SUBCUTANEOUS PRN
Status: DISCONTINUED | OUTPATIENT
Start: 2020-10-27 | End: 2020-10-27 | Stop reason: SDUPTHER

## 2020-10-27 RX ORDER — ONDANSETRON 2 MG/ML
4 INJECTION INTRAMUSCULAR; INTRAVENOUS
Status: DISCONTINUED | OUTPATIENT
Start: 2020-10-27 | End: 2020-10-27 | Stop reason: HOSPADM

## 2020-10-27 RX ADMIN — SODIUM CHLORIDE, POTASSIUM CHLORIDE, SODIUM LACTATE AND CALCIUM CHLORIDE: 600; 310; 30; 20 INJECTION, SOLUTION INTRAVENOUS at 09:37

## 2020-10-27 RX ADMIN — CEFAZOLIN 2 G: 10 INJECTION, POWDER, FOR SOLUTION INTRAVENOUS at 11:53

## 2020-10-27 RX ADMIN — ROCURONIUM BROMIDE 50 MG: 10 INJECTION, SOLUTION INTRAVENOUS at 11:14

## 2020-10-27 RX ADMIN — PHENYLEPHRINE HYDROCHLORIDE 20 MCG/MIN: 10 INJECTION, SOLUTION INTRAMUSCULAR; INTRAVENOUS; SUBCUTANEOUS at 11:43

## 2020-10-27 RX ADMIN — LABETALOL HYDROCHLORIDE 5 MG: 5 INJECTION, SOLUTION INTRAVENOUS at 13:31

## 2020-10-27 RX ADMIN — FENTANYL CITRATE 25 MCG: 50 INJECTION, SOLUTION INTRAMUSCULAR; INTRAVENOUS at 14:16

## 2020-10-27 RX ADMIN — LABETALOL HYDROCHLORIDE 5 MG: 5 INJECTION, SOLUTION INTRAVENOUS at 13:59

## 2020-10-27 RX ADMIN — DEXAMETHASONE SODIUM PHOSPHATE 10 MG: 10 INJECTION INTRAMUSCULAR; INTRAVENOUS at 12:02

## 2020-10-27 RX ADMIN — MELOXICAM 15 MG: 7.5 TABLET ORAL at 18:11

## 2020-10-27 RX ADMIN — FENTANYL CITRATE 25 MCG: 50 INJECTION, SOLUTION INTRAMUSCULAR; INTRAVENOUS at 16:03

## 2020-10-27 RX ADMIN — CLOPIDOGREL BISULFATE 75 MG: 75 TABLET ORAL at 18:11

## 2020-10-27 RX ADMIN — SUGAMMADEX 200 MG: 100 INJECTION, SOLUTION INTRAVENOUS at 13:48

## 2020-10-27 RX ADMIN — PROTAMINE SULFATE 10 MG: 10 INJECTION, SOLUTION INTRAVENOUS at 13:28

## 2020-10-27 RX ADMIN — NIFEDIPINE 60 MG: 30 TABLET, FILM COATED, EXTENDED RELEASE ORAL at 20:37

## 2020-10-27 RX ADMIN — Medication 100 MCG: at 11:14

## 2020-10-27 RX ADMIN — GLYCOPYRROLATE AND FORMOTEROL FUMARATE 2 PUFF: 9; 4.8 AEROSOL, METERED RESPIRATORY (INHALATION) at 19:28

## 2020-10-27 RX ADMIN — HYDROCODONE BITARTRATE AND ACETAMINOPHEN 2 TABLET: 5; 325 TABLET ORAL at 18:10

## 2020-10-27 RX ADMIN — LIDOCAINE HYDROCHLORIDE 100 MG: 20 INJECTION, SOLUTION EPIDURAL; INFILTRATION; INTRACAUDAL; PERINEURAL at 13:48

## 2020-10-27 RX ADMIN — ONDANSETRON 4 MG: 2 INJECTION, SOLUTION INTRAMUSCULAR; INTRAVENOUS at 12:02

## 2020-10-27 RX ADMIN — FENTANYL CITRATE 50 MCG: 50 INJECTION, SOLUTION INTRAMUSCULAR; INTRAVENOUS at 16:15

## 2020-10-27 RX ADMIN — DULOXETINE HYDROCHLORIDE 120 MG: 60 CAPSULE, DELAYED RELEASE ORAL at 18:11

## 2020-10-27 RX ADMIN — GABAPENTIN 400 MG: 400 CAPSULE ORAL at 20:37

## 2020-10-27 RX ADMIN — BUPROPION HYDROCHLORIDE 300 MG: 150 TABLET, EXTENDED RELEASE ORAL at 18:11

## 2020-10-27 RX ADMIN — SODIUM CHLORIDE, POTASSIUM CHLORIDE, SODIUM LACTATE AND CALCIUM CHLORIDE: 600; 310; 30; 20 INJECTION, SOLUTION INTRAVENOUS at 11:20

## 2020-10-27 RX ADMIN — HEPARIN SODIUM 6000 UNITS: 1000 INJECTION INTRAVENOUS; SUBCUTANEOUS at 12:31

## 2020-10-27 RX ADMIN — ALPRAZOLAM 1 MG: 1 TABLET ORAL at 20:37

## 2020-10-27 RX ADMIN — ROCURONIUM BROMIDE 40 MG: 10 INJECTION, SOLUTION INTRAVENOUS at 11:55

## 2020-10-27 RX ADMIN — FAMOTIDINE 40 MG: 20 TABLET, FILM COATED ORAL at 20:37

## 2020-10-27 RX ADMIN — PROPOFOL 200 MG: 10 INJECTION, EMULSION INTRAVENOUS at 11:14

## 2020-10-27 RX ADMIN — FENTANYL CITRATE 50 MCG: 50 INJECTION, SOLUTION INTRAMUSCULAR; INTRAVENOUS at 14:39

## 2020-10-27 RX ADMIN — Medication 1000 UNITS: at 18:11

## 2020-10-27 RX ADMIN — LIDOCAINE HYDROCHLORIDE 100 MG: 20 INJECTION, SOLUTION EPIDURAL; INFILTRATION; INTRACAUDAL; PERINEURAL at 11:14

## 2020-10-27 ASSESSMENT — PAIN DESCRIPTION - PAIN TYPE
TYPE: SURGICAL PAIN

## 2020-10-27 ASSESSMENT — PAIN DESCRIPTION - PROGRESSION
CLINICAL_PROGRESSION: GRADUALLY IMPROVING
CLINICAL_PROGRESSION: RAPIDLY WORSENING
CLINICAL_PROGRESSION: GRADUALLY WORSENING
CLINICAL_PROGRESSION: NOT CHANGED
CLINICAL_PROGRESSION: GRADUALLY IMPROVING
CLINICAL_PROGRESSION: GRADUALLY WORSENING
CLINICAL_PROGRESSION: NOT CHANGED
CLINICAL_PROGRESSION: GRADUALLY IMPROVING
CLINICAL_PROGRESSION: NOT CHANGED
CLINICAL_PROGRESSION: RAPIDLY WORSENING
CLINICAL_PROGRESSION: GRADUALLY WORSENING
CLINICAL_PROGRESSION: NOT CHANGED

## 2020-10-27 ASSESSMENT — PULMONARY FUNCTION TESTS
PIF_VALUE: 20
PIF_VALUE: 21
PIF_VALUE: 20
PIF_VALUE: 1
PIF_VALUE: 22
PIF_VALUE: 20
PIF_VALUE: 20
PIF_VALUE: 22
PIF_VALUE: 20
PIF_VALUE: 0
PIF_VALUE: 20
PIF_VALUE: 20
PIF_VALUE: 24
PIF_VALUE: 23
PIF_VALUE: 21
PIF_VALUE: 1
PIF_VALUE: 21
PIF_VALUE: 2
PIF_VALUE: 5
PIF_VALUE: 3
PIF_VALUE: 22
PIF_VALUE: 23
PIF_VALUE: 19
PIF_VALUE: 20
PIF_VALUE: 22
PIF_VALUE: 1
PIF_VALUE: 20
PIF_VALUE: 23
PIF_VALUE: 23
PIF_VALUE: 24
PIF_VALUE: 19
PIF_VALUE: 21
PIF_VALUE: 21
PIF_VALUE: 23
PIF_VALUE: 20
PIF_VALUE: 21
PIF_VALUE: 18
PIF_VALUE: 19
PIF_VALUE: 22
PIF_VALUE: 2
PIF_VALUE: 20
PIF_VALUE: 2
PIF_VALUE: 20
PIF_VALUE: 22
PIF_VALUE: 23
PIF_VALUE: 23
PIF_VALUE: 21
PIF_VALUE: 22
PIF_VALUE: 0
PIF_VALUE: 1
PIF_VALUE: 3
PIF_VALUE: 22
PIF_VALUE: 22
PIF_VALUE: 23
PIF_VALUE: 22
PIF_VALUE: 22
PIF_VALUE: 23
PIF_VALUE: 22
PIF_VALUE: 20
PIF_VALUE: 23
PIF_VALUE: 19
PIF_VALUE: 23
PIF_VALUE: 22
PIF_VALUE: 23
PIF_VALUE: 2
PIF_VALUE: 23
PIF_VALUE: 23
PIF_VALUE: 19
PIF_VALUE: 22
PIF_VALUE: 20
PIF_VALUE: 21
PIF_VALUE: 23
PIF_VALUE: 23
PIF_VALUE: 22
PIF_VALUE: 20
PIF_VALUE: 22
PIF_VALUE: 21
PIF_VALUE: 2
PIF_VALUE: 22
PIF_VALUE: 19
PIF_VALUE: 22
PIF_VALUE: 28
PIF_VALUE: 23
PIF_VALUE: 19
PIF_VALUE: 20
PIF_VALUE: 20
PIF_VALUE: 26
PIF_VALUE: 21
PIF_VALUE: 23
PIF_VALUE: 20
PIF_VALUE: 20
PIF_VALUE: 19
PIF_VALUE: 23
PIF_VALUE: 20
PIF_VALUE: 21
PIF_VALUE: 17
PIF_VALUE: 19
PIF_VALUE: 20
PIF_VALUE: 1
PIF_VALUE: 19
PIF_VALUE: 21
PIF_VALUE: 19
PIF_VALUE: 21
PIF_VALUE: 2
PIF_VALUE: 19
PIF_VALUE: 20
PIF_VALUE: 23
PIF_VALUE: 2
PIF_VALUE: 22
PIF_VALUE: 19
PIF_VALUE: 23
PIF_VALUE: 26
PIF_VALUE: 21
PIF_VALUE: 22
PIF_VALUE: 19
PIF_VALUE: 22
PIF_VALUE: 2
PIF_VALUE: 21
PIF_VALUE: 2
PIF_VALUE: 21
PIF_VALUE: 20
PIF_VALUE: 19
PIF_VALUE: 20
PIF_VALUE: 20
PIF_VALUE: 22
PIF_VALUE: 2
PIF_VALUE: 19
PIF_VALUE: 24
PIF_VALUE: 21
PIF_VALUE: 2
PIF_VALUE: 19
PIF_VALUE: 23
PIF_VALUE: 20
PIF_VALUE: 20
PIF_VALUE: 23
PIF_VALUE: 19
PIF_VALUE: 1
PIF_VALUE: 22
PIF_VALUE: 20
PIF_VALUE: 21
PIF_VALUE: 23
PIF_VALUE: 23
PIF_VALUE: 10
PIF_VALUE: 10
PIF_VALUE: 20
PIF_VALUE: 3
PIF_VALUE: 20
PIF_VALUE: 20
PIF_VALUE: 23
PIF_VALUE: 28
PIF_VALUE: 23
PIF_VALUE: 3
PIF_VALUE: 19
PIF_VALUE: 19
PIF_VALUE: 21
PIF_VALUE: 27
PIF_VALUE: 23
PIF_VALUE: 21
PIF_VALUE: 20
PIF_VALUE: 1
PIF_VALUE: 24
PIF_VALUE: 1
PIF_VALUE: 19
PIF_VALUE: 20
PIF_VALUE: 22
PIF_VALUE: 3
PIF_VALUE: 20
PIF_VALUE: 17

## 2020-10-27 ASSESSMENT — PAIN SCALES - GENERAL
PAINLEVEL_OUTOF10: 6
PAINLEVEL_OUTOF10: 4
PAINLEVEL_OUTOF10: 7
PAINLEVEL_OUTOF10: 4
PAINLEVEL_OUTOF10: 6
PAINLEVEL_OUTOF10: 2
PAINLEVEL_OUTOF10: 5
PAINLEVEL_OUTOF10: 9
PAINLEVEL_OUTOF10: 2
PAINLEVEL_OUTOF10: 5
PAINLEVEL_OUTOF10: 8
PAINLEVEL_OUTOF10: 5

## 2020-10-27 ASSESSMENT — ENCOUNTER SYMPTOMS
RESPIRATORY NEGATIVE: 1
EYES NEGATIVE: 1
ALLERGIC/IMMUNOLOGIC NEGATIVE: 1
GASTROINTESTINAL NEGATIVE: 1

## 2020-10-27 ASSESSMENT — PAIN DESCRIPTION - DESCRIPTORS
DESCRIPTORS: ACHING
DESCRIPTORS: SORE
DESCRIPTORS: ACHING

## 2020-10-27 ASSESSMENT — PAIN DESCRIPTION - LOCATION
LOCATION: NECK;HEAD
LOCATION: HEAD;NECK
LOCATION: HEAD;NECK
LOCATION: NECK;HEAD
LOCATION: HEAD;NECK
LOCATION: NECK
LOCATION: NECK;HEAD

## 2020-10-27 ASSESSMENT — PAIN DESCRIPTION - ORIENTATION
ORIENTATION: RIGHT

## 2020-10-27 ASSESSMENT — LIFESTYLE VARIABLES: SMOKING_STATUS: 1

## 2020-10-27 ASSESSMENT — PAIN DESCRIPTION - FREQUENCY
FREQUENCY: CONTINUOUS
FREQUENCY: CONTINUOUS
FREQUENCY: INTERMITTENT
FREQUENCY: CONTINUOUS

## 2020-10-27 ASSESSMENT — PAIN DESCRIPTION - ONSET
ONSET: ON-GOING
ONSET: PROGRESSIVE
ONSET: PROGRESSIVE
ONSET: ON-GOING
ONSET: GRADUAL

## 2020-10-27 ASSESSMENT — PAIN - FUNCTIONAL ASSESSMENT: PAIN_FUNCTIONAL_ASSESSMENT: 0-10
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
## 2020-10-27 NOTE — BRIEF OP NOTE
Brief Postoperative Note      Patient: Gail Jackson  YOB: 1955  MRN: [id number]    Date of Procedure: 10/27/2020    Pre-Op Diagnosis: DX SEVERE 95% RIGHT CAROTID STENOSIS    Post-Op Diagnosis: Same       Procedure(s):  RIGHT CAROTID ENDARTERECTOMY    Surgeon(s):  Merlene Breaux MD    Assistant:  Surgical Assistant: Bonny Aquino    Anesthesia: General    Estimated Blood Loss (mL): less than 50     Complications: None    Specimens:   ID Type Source Tests Collected by Time Destination   A : right carotid plaque Tissue Carotid Arteries SURGICAL PATHOLOGY Merlene Breaux MD 10/27/2020 1254        Implants:  Implant Name Type Inv. Item Serial No.  Lot No. LRB No. Used Action   GARCIA-PATCH BOVINE VASC 0.8X8CM Vascular/Graft/Patch/Filter [facility] BOVINE VASC 0.8X8CM  [employer] [id number] Right 1 Implanted         Drains: * No LDAs found *    Findings: SEVERE 95% RT ICA STENOSIS WITH ULCERATION.     Electronically signed by Merlene Breaux MD on 10/27/2020 at 1:51 PM
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
## 2020-10-27 NOTE — ANESTHESIA POSTPROCEDURE EVALUATION
Department of Anesthesiology  Postprocedure Note    Patient: Jose Bello  MRN: [id number]  YOB: 1955  Date of evaluation: 10/27/2020  Time:  2:47 PM     Procedure Summary     Date:  10/27/20 Room / Location:  [facility] [address] / [place] [address]    Anesthesia Start:  1108 Anesthesia Stop:  9891    Procedure:  RIGHT CAROTID ENDARTERECTOMY (Right Neck) Diagnosis:  (DX RIGHT C AROTID STENOSIS)    Surgeon:  Mervat Hobson MD Responsible Provider:  Kristel Claudio MD    Anesthesia Type:  general ASA Status:  4          Anesthesia Type: general    Stephanie Phase I: Stephanie Score: 9    Stephanie Phase II:      Last vitals: Reviewed and per EMR flowsheets.        Anesthesia Post Evaluation    Patient location during evaluation: PACU  Level of consciousness: awake and alert  Complications: no

## 2020-10-27 NOTE — CONSULTS
[facility]  Office: [address], DO, Tj Wang, DO, Arie Jimenez, DO, Melanie Vale, DO, Toya Campbell MD, Carol De Paz MD, Tyrell Payne MD, Yvette Harden MD, Jim Ruff MD, Marva Saravia MD, Layne Germain MD, Parker Alexander MD, Wes Marr MD, Fallon Garcia DO, Fatimah Hawkins MD, Issac Tao MD, Media Sensor, DO, Dino Levy MD,  Eddy Wallis DO, Tom Choudhury MD, Carlos Payne MD, Mayito Jimenez, CNP, [facility], CNP, Elmira Jj, CNP, Maryuri Waldrop, CNS, Jossy Rubin, CNP, Ksenia Izaguirre, CNP, Torsten Alexander, CNP, Merlinda Goring, CNP, Jacquelyn Godinez, CNP, Sandeep Chapin PA-C, Rosie Cardoza, MAI, William Cardoza, CNP, Angélica Childress, CNP, Shalom Bennett, CNP, Olga Lidia Lafleur, CNP, Loyda Nieto, CNP         [address] / HISTORY AND PHYSICAL EXAMINATION            Date:   10/27/2020  Patient name:  Brandon Pearson  Date of admission:  10/27/2020  8:58 AM  MRN:   [id number]  Account:  [de-identified]  YOB: 1955  PCP:    Socorro Plunkett MD  Room:   2031/2031-01  Code Status:    Prior    Physician Requesting Consult: Daniel Irwin MD    Reason for Consult: Medical management    Chief Complaint:     Severe right carotid stenosis    History Obtained From:     patient, electronic medical record    History of Present Illness:     Patient presented today for a right carotid endarterectomy with Dr. Christiano Bernal due to severe 95% right carotid stenosis. She has an extensive medical history including bilateral carotid stenosis, COPD, hypertension, GERD, CAD, depression, anxiety, RLS, hyperlipidemia, and current tobacco use. She is seen in the postoperative period. She has no complaints at this time. She appears to have tolerated the procedure well. Preoperative laboratory studies were reviewed. Pain is well controlled. She denies any concerns at this time.      Past Medical History:     Past Medical History:   Diagnosis Date    Anxiety     CAD (coronary artery disease)     Carotid artery stenosis     Cataract     [facility]congestive [facility] failure) [facility]     Chronic back pain     on 5/30/19 pt states is presently in pain mgmnt    Colon polyp 02/26/2020    tubular adenoma    COPD (chronic obstructive pulmonary disease) ([facility])     Depression     Esophageal reflux     Fibromyalgia     Head injury     Multiple head injuries from 4083-7471.  Headache(784.0)     Hearing loss     Pt wears hearing aids    Heart attack [facility]) Unknown    2005    Hyperlipidemia     Hypertension     Insomnia     Iron deficiency anemia     MARTHA (obstructive sleep apnea)     Osteoarthritis     Pneumonia     \"years ago\" per patient (written 10/14/2020)    Restless leg syndrome     Short-term memory loss     Shortness of breath     cannot climb 2 flights of stairs or walk 2 city blocks without becoming SOB    TIA (transient ischemic attack) 2005    Tinnitus     Ulcerative colitis ([facility] [address]     Unspecified cerebral artery occlusion with cerebral infarction 2005    TIA per patient        Past Surgical History:     Past Surgical History:   Procedure Laterality Date    ANESTHESIA NERVE BLOCK Left 11/13/2017    lumbar epidural steroid injection  performed by Marvin Tavares MD at [facility] Left 1/29/2018    NERVE BLOCK LEFT CERVICAL MEDIAL BRANCH C3 TON C4 C5 performed by Marvin Tavares MD at [facility] Bilateral 4/15/2019    BILATERAL LUMBAR MEDIAL [address] L2-L5 performed by Marvin Tavares MD at [facility] Bilateral 4/22/2019    [address] L2-L5 performed by Marvin Tavares MD at [address] [place] [address]  2015    No stents placed.      COLONOSCOPY  5/2012    severe spasms in the sigmoid colon , diverticulosis    COLONOSCOPY  02/26/2020    tubular adenoma    COLONOSCOPY 2/26/2020    COLONOSCOPY POLYPECTOMY COLD BIOPSY performed by Naeem Eastman MD at [facility]      che bone reconstruction     EPIDURAL STEROID INJECTION Left 9/25/2017    EPIDURAL STEROID INJECTION LEFT L4 L5  performed by Shawnie Canavan, MD at [facility]  06/21/2014    lumbar CARLEY     LUMBAR SPINE SURGERY  05/05/2014    lumbar CARLEY     NECK SURGERY      cadaverbone placement    NECK SURGERY  06/07/2014    cervical CARLEY     NERVE BLOCK Left 4/7/14    CERVIAL NECK    NERVE BLOCK N/A 2/29/2016    LUMBAR CARLEY    NERVE BLOCK N/A 11/07/2016    LUMBAR CARLEY    NERVE BLOCK Left 11/13/2017    lumbar CARLEY    NERVE BLOCK Left 01/29/2018    C3-5    NERVE BLOCK Right 02/12/2018    cervical medial brach    NERVE BLOCK Right 10/15/2018    Lumbar CARLEY    OTHER SURGICAL HISTORY  12/15/2014    lumbar steroid injection    OTHER SURGICAL HISTORY  7-20-15    left cervical steroid injection    OTHER SURGICAL HISTORY  01-25-16    Bilateral sacroiliac joint injection     OTHER SURGICAL HISTORY  09/25/2017    Lumbar Epidural Steroid injection    OTHER SURGICAL HISTORY Left 08/20/2018    cervical facet injection C2-3 and 4-5    OTHER SURGICAL HISTORY  12/03/2018    NERVE RADIOFREQUENCY ABLATION LEFT CERVICAL MEDIAL BRANCH C3/TON/C4/C5 (Left )    OK INJ DX/THER AGNT PARAVERT FACET JOINT, CERV/THORAC, 1ST LEVEL Left 8/20/2018    LEFT CERVICAL FACET STEROID INJECTION C2/34 C3/4 C4/5 performed by Shawnie Canavan, MD at [place] [address] DX/THER AGNT PARAVERT FACET JOINT, LUMBAR/SAC, 1ST LEVEL Bilateral 10/15/2018    NANCY LUMBAR FACET STEROID INJECTION performed by Shawnie Canavan, MD at [address] Left 2/12/2018    NERVE BLOCK LEFT CERVICAL MEDIAL BRANCH C3/TON/C4/C5 performed by Shawnie Canavan, MD at [address] OFFICE/OUTPT [facility] Left 12/3/2018    NERVE RADIOFREQUENCY ABLATION LEFT CERVICAL MEDIAL BRANCH C3/TON/C4/C5 performed by Samia Ac MD at [facility] Left 3/4/2019    NERVE RADIOFREQUENCY ABLATION LEFT CERVICAL   MEDIAL BRANCH  C3/TON/C4/C5 performed by Samia Ac MD at [facility] Right 6/24/2019    RIGHT LUMBAR MEDIAL BRANCH NERVE RADIOFREQUENCY ABLATION  L2-L5 performed by Samia Ac MD at [address] [place] [address] [facility]  5/2012    normal    UPPER GASTROINTESTINAL ENDOSCOPY  7 17 15    biopsy, pathology-mild chronic inflammation    UPPER GASTROINTESTINAL ENDOSCOPY  02/26/2020    with biopsies     UPPER GASTROINTESTINAL ENDOSCOPY  2/26/2020    EGD BIOPSY performed by Nae Jeong MD at [facility]        Medications Prior to Admission:     Prior to Admission medications    Medication Sig Start Date End Date Taking? Authorizing Provider   famotidine (PEPCID) 40 MG tablet Take 40 mg by mouth nightly    Yes Historical Provider, MD   cetirizine (ZYRTEC) 10 MG tablet TAKE 1 TABLET BY MOUTH DAILY 9/18/19  Yes TAMI Howard CNP   RESTASIS 0.05 % ophthalmic emulsion INSTILL 1 DROP INTO BOTH EYES TWICE DAILY 9/18/19  Yes TAMI Howard CNP   HYDROcodone-acetaminophen (NORCO) 7.5-325 MG per tablet Take 1 tablet by mouth every 6 hours as needed for Pain for up to 30 days.  To be filled 7/24/19 8/23/19 10/27/20 Yes Samia Ac MD   rOPINIRole (REQUIP) 3 MG tablet TAKE 1 TABLET BY MOUTH EVERY NIGHT 8/14/19  Yes TAMI Howard CNP   ipratropium-albuterol (DUONEB) 0.5-2.5 (3) MG/3ML SOLN nebulizer solution INHALE THE CONTENTS OF 1 VIAL (3ML) BY MOUTH AND INTO THE LUNGS ONCE EVERY 6 HOURS AS NEEDED FOR FOR SHORTNESS OF BREATH 8/14/19  Yes TAMI Howard CNP   Cholecalciferol (VITAMIN D3) 1000 units TABS TAKE 1 TABLET BY MOUTH DAILY 8/14/19  Yes TAMI Howard CNP   NIFEdipine (PROCARDIA XL) 60 MG extended release tablet TAKE 1 TABLET BY MOUTH TWICE DAILY 8/14/19  Yes Keerthi Butler APRN - CNP   BEVESPI AEROSPHERE 9-4.8 MCG/ACT AERO INHALE 2 PUFFS BY MOUTH INTO THE LUNGS TWICE DAILY 8/14/19  Yes TAMI Howard CNP   gabapentin (NEURONTIN) 400 MG capsule TAKE 1 CAPSULE BY MOUTH THREE TIMES DAILY 7/16/19 10/27/20 Yes Samia Ac MD   omeprazole (PRILOSEC) 40 MG delayed release capsule Take 1 capsule by mouth daily  Patient taking differently: Take 40 mg by mouth 2 times daily  5/15/19  Yes TAMI Howard CNP   aspirin 325 MG tablet Take 325 mg by mouth as needed for Pain   Yes Historical Provider, MD   VENTOLIN  (90 Base) MCG/ACT inhaler INHALE 2 PUFFS INTO THE LUNGS EVERY 6 HOURS AS NEEDED FOR WHEEZING 1/26/19  Yes TAMI Howard CNP   buPROPion (WELLBUTRIN XL) 150 MG extended release tablet Take 300 mg by mouth daily Indications: 150 mg x2 daily Taking 400 mg daily (2 of 200s) 3/12/18  Yes JUSTIN Gayle   FLOVENT  MCG/ACT inhaler INHALE 2 PUFFS INTO THE LUNGS TWICE DAILY  Patient taking differently: INHALE 2 PUFFS INTO THE LUNGS TWICE DAILY PRN 1/15/18  Yes TAMI Howard CNP   ALPRAZolam Dominick Georgia) 1 MG tablet Take by mouth as needed . 10/10/17  Yes Historical Provider, MD   DULoxetine (CYMBALTA) 60 MG extended release capsule Take 1 capsule by mouth daily  Patient taking differently: Take 120 mg by mouth daily  5/27/17  Yes TAMI Howard CNP   clopidogrel (PLAVIX) 75 MG tablet Take 75 mg by mouth daily    Historical Provider, MD   mometasone (ELOCON) 0.1 % cream APPLY TOPICALLY TO THE AFFECTED AREA(S) DAILY 8/14/19   TAMI Leyva CNP   meloxicam (MOBIC) 15 MG tablet TAKE 1 TABLET BY MOUTH DAILY 7/16/19   Samia cA MD   Nebulizers (COMPRESSOR/NEBULIZER) MISC Use with albuterol solution every 6 hours as needed. 3/20/15   Catheline Campanile, APRN - CNP        Allergies:     Patient has no known allergies. Social History:     Tobacco:    reports that she has been smoking cigarettes.  She has a 20.00 pack-year smoking history. She has quit using smokeless tobacco.  Alcohol:      reports current alcohol use. Drug Use:  reports no history of drug use. Family History:     Family History   Problem Relation Age of Onset    Diabetes Mother     Alzheimer's Disease Mother     High Blood Pressure Father     Diabetes Sister     High Blood Pressure Sister     High Blood Pressure Brother     Diabetes Maternal Grandmother        Review of Systems:     Positive and Negative as described in HPI. Review of Systems   Constitutional: Negative. HENT: Negative. Eyes: Negative. Respiratory: Negative. Cardiovascular: Negative. Gastrointestinal: Negative. Endocrine: Negative. Genitourinary: Negative. Musculoskeletal: Negative. Skin: Negative. Allergic/Immunologic: Negative. Neurological: Negative. Hematological: Negative. Psychiatric/Behavioral: Negative. Physical Exam:     /75   Pulse 85   Temp 97.5 °F (36.4 °C) (Temporal)   Resp 25   Ht 5' 1\" (1.549 m)   Wt 163 lb 12.8 oz (74.3 kg)   SpO2 95%   BMI 30.95 kg/m²   Temp (24hrs), Av.1 °F (36.2 °C), Min:96.3 °F (35.7 °C), Max:98.4 °F (36.9 °C)    No results for input(s): POCGLU in the last 72 hours. Intake/Output Summary (Last 24 hours) at 10/27/2020 2000  Last data filed at 10/27/2020 1850  Gross per 24 hour   Intake 2868 ml   Output 330 ml   Net 2538 ml       Physical Exam  Vitals signs and nursing note reviewed. Constitutional:       General: She is not in acute distress. Appearance: Normal appearance. She is obese. She is not ill-appearing, toxic-appearing or diaphoretic. HENT:      Head: Normocephalic and atraumatic. Right Ear: External ear normal.      Left Ear: External ear normal.      Nose: Nose normal. No congestion or rhinorrhea. Mouth/Throat:      Mouth: Mucous membranes are moist.   Eyes:      General: No scleral icterus. Right eye: No discharge. Left eye: No discharge. Extraocular Movements: Extraocular movements intact. Conjunctiva/sclera: Conjunctivae normal.      Pupils: Pupils are equal, round, and reactive to light. Neck:      Musculoskeletal: Normal range of motion and neck supple. No neck rigidity or muscular tenderness. Vascular: No carotid bruit. Cardiovascular:      Rate and Rhythm: Normal rate and regular rhythm. Pulses: Normal pulses. Heart sounds: Normal heart sounds. No murmur. No friction rub. No gallop. Pulmonary:      Effort: Pulmonary effort is normal. No respiratory distress. Breath sounds: Normal breath sounds. No stridor. No wheezing, rhonchi or rales. Chest:      Chest wall: No tenderness. Abdominal:      General: Bowel sounds are normal. There is no distension. Palpations: Abdomen is soft. There is no mass. Tenderness: There is no abdominal tenderness. There is no guarding or rebound. Hernia: No hernia is present. Musculoskeletal:         General: No swelling, tenderness, deformity or signs of injury. Right lower leg: No edema. Left lower leg: No edema. Lymphadenopathy:      Cervical: No cervical adenopathy. Skin:     General: Skin is warm and dry. Capillary Refill: Capillary refill takes less than 2 seconds. Coloration: Skin is not jaundiced or pale. Findings: No bruising, erythema, lesion or rash. Comments: Surgical dressing right neck, no drainage noted   Neurological:      General: No focal deficit present. Mental Status: She is alert and oriented to person, place, and time. Sensory: No sensory deficit. Motor: No weakness. Coordination: Coordination normal.   Psychiatric:         Mood and Affect: Mood normal.         Behavior: Behavior normal.         Investigations:      Laboratory Testing:  No results found for this or any previous visit (from the past 24 hour(s)). Imaging/Diagonstics:  No results found.     Assessment :      Hospital Problems Last Modified POA    * (Principal) Carotid stenosis, bilateral (Chronic) 10/27/2020 Yes    Chronic obstructive pulmonary disease ([address] 10/27/2020 Yes    HTN (hypertension) 10/27/2020 Yes    GERD (gastroesophageal reflux disease) 10/27/2020 Yes    CAD (coronary artery disease) 10/27/2020 Yes    Overview Signed 4/8/2015 11:55 PM by TAMI Musa NP     Normal stress test without any evidence of ischemia 4/2015         Depression 10/27/2020 Yes    Anxiety 10/27/2020 Yes    RLS (restless legs syndrome) 10/27/2020 Yes    Mixed hyperlipidemia 10/27/2020 Yes    Smoker 10/27/2020 Yes    Carotid stenosis, right 10/27/2020 Yes          Plan:     1. Your continued vascular surgical care  2. Ancef postoperatively at your discretion  3. Check a.m. labs  4. Continue antihypertensives-Procardia  5. COPD: Continue Bevespi  6. Depression anxiety: Continue Wellbutrin and Xanax  7. Check bladder scans every 8 hours. As needed straight cath for PVR greater than 300 cc  8. Norco for pain control  9. Continue neurovascular checks, monitor for signs and symptoms of possible stroke. 10. Incentive spirometry  11. Smoking cessation education  12.  We will continue to follow    Consultations:   Kasi Katz HOSPITALIST      TAMI Tilley CNP  10/27/2020  8:00 PM    Copy sent to Dr. Dmitriy Spencer MD

## 2020-10-28 VITALS
HEART RATE: 90 BPM | DIASTOLIC BLOOD PRESSURE: 80 MMHG | SYSTOLIC BLOOD PRESSURE: 159 MMHG | WEIGHT: 160 LBS | BODY MASS INDEX: 30.21 KG/M2 | RESPIRATION RATE: 16 BRPM | OXYGEN SATURATION: 90 % | TEMPERATURE: 96.9 F | HEIGHT: 61 IN

## 2020-10-28 LAB
ABSOLUTE EOS #: 0.03 K/UL (ref 0–0.44)
ABSOLUTE IMMATURE GRANULOCYTE: 0.04 K/UL (ref 0–0.3)
ABSOLUTE LYMPH #: 2.25 K/UL (ref 1.1–3.7)
ABSOLUTE MONO #: 0.69 K/UL (ref 0.1–1.2)
ANION GAP SERPL CALCULATED.3IONS-SCNC: 6 MMOL/L (ref 9–17)
BASOPHILS # BLD: 1 % (ref 0–2)
BASOPHILS ABSOLUTE: 0.08 K/UL (ref 0–0.2)
BUN BLDV-MCNC: 11 MG/DL (ref 8–23)
BUN/CREAT BLD: 17 (ref 9–20)
CALCIUM SERPL-MCNC: 9 MG/DL (ref 8.6–10.4)
CHLORIDE BLD-SCNC: 101 MMOL/L (ref 98–107)
CO2: 31 MMOL/L (ref 20–31)
CREAT SERPL-MCNC: 0.63 MG/DL (ref 0.5–0.9)
DIFFERENTIAL TYPE: ABNORMAL
EOSINOPHILS RELATIVE PERCENT: 0 % (ref 1–4)
GFR AFRICAN AMERICAN: >60 ML/MIN
GFR NON-AFRICAN AMERICAN: >60 ML/MIN
GFR SERPL CREATININE-BSD FRML MDRD: ABNORMAL ML/MIN/{1.73_M2}
GFR SERPL CREATININE-BSD FRML MDRD: ABNORMAL ML/MIN/{1.73_M2}
GLUCOSE BLD-MCNC: 111 MG/DL (ref 70–99)
HCT VFR BLD CALC: 34.1 % (ref 36.3–47.1)
HEMOGLOBIN: 10.7 G/DL (ref 11.9–15.1)
IMMATURE GRANULOCYTES: 0 %
LYMPHOCYTES # BLD: 24 % (ref 24–43)
MCH RBC QN AUTO: 29.6 PG (ref 25.2–33.5)
MCHC RBC AUTO-ENTMCNC: 31.4 G/DL (ref 28.4–34.8)
MCV RBC AUTO: 94.2 FL (ref 82.6–102.9)
MONOCYTES # BLD: 7 % (ref 3–12)
NRBC AUTOMATED: 0 PER 100 WBC
PDW BLD-RTO: 12.5 % (ref 11.8–14.4)
PLATELET # BLD: 253 K/UL (ref 138–453)
PLATELET ESTIMATE: ABNORMAL
PMV BLD AUTO: 8.5 FL (ref 8.1–13.5)
POTASSIUM SERPL-SCNC: 4.1 MMOL/L (ref 3.7–5.3)
RBC # BLD: 3.62 M/UL (ref 3.95–5.11)
RBC # BLD: ABNORMAL 10*6/UL
SEG NEUTROPHILS: 68 % (ref 36–65)
SEGMENTED NEUTROPHILS ABSOLUTE COUNT: 6.18 K/UL (ref 1.5–8.1)
SODIUM BLD-SCNC: 138 MMOL/L (ref 135–144)
SURGICAL PATHOLOGY REPORT: NORMAL
WBC # BLD: 9.3 K/UL (ref 3.5–11.3)
WBC # BLD: ABNORMAL 10*3/UL

## 2020-10-28 PROCEDURE — 94640 AIRWAY INHALATION TREATMENT: CPT

## 2020-10-28 PROCEDURE — 80048 BASIC METABOLIC PNL TOTAL CA: CPT

## 2020-10-28 PROCEDURE — 6370000000 HC RX 637 (ALT 250 FOR IP): Performed by: SURGERY

## 2020-10-28 PROCEDURE — 36415 COLL VENOUS BLD VENIPUNCTURE: CPT

## 2020-10-28 PROCEDURE — 99231 SBSQ HOSP IP/OBS SF/LOW 25: CPT | Performed by: INTERNAL MEDICINE

## 2020-10-28 PROCEDURE — 85025 COMPLETE CBC W/AUTO DIFF WBC: CPT

## 2020-10-28 PROCEDURE — 6370000000 HC RX 637 (ALT 250 FOR IP): Performed by: NURSE PRACTITIONER

## 2020-10-28 RX ORDER — ALPRAZOLAM 1 MG/1
1 TABLET ORAL 2 TIMES DAILY PRN
Status: DISCONTINUED | OUTPATIENT
Start: 2020-10-28 | End: 2020-10-28 | Stop reason: HOSPADM

## 2020-10-28 RX ADMIN — GABAPENTIN 400 MG: 400 CAPSULE ORAL at 08:41

## 2020-10-28 RX ADMIN — PANTOPRAZOLE SODIUM 40 MG: 40 TABLET, DELAYED RELEASE ORAL at 05:02

## 2020-10-28 RX ADMIN — ALPRAZOLAM 1 MG: 1 TABLET ORAL at 13:39

## 2020-10-28 RX ADMIN — MELOXICAM 15 MG: 7.5 TABLET ORAL at 08:42

## 2020-10-28 RX ADMIN — BUPROPION HYDROCHLORIDE 300 MG: 150 TABLET, EXTENDED RELEASE ORAL at 08:41

## 2020-10-28 RX ADMIN — CETIRIZINE HYDROCHLORIDE 10 MG: 10 TABLET, FILM COATED ORAL at 08:42

## 2020-10-28 RX ADMIN — HYDROCODONE BITARTRATE AND ACETAMINOPHEN 2 TABLET: 5; 325 TABLET ORAL at 10:21

## 2020-10-28 RX ADMIN — NIFEDIPINE 60 MG: 30 TABLET, FILM COATED, EXTENDED RELEASE ORAL at 08:42

## 2020-10-28 RX ADMIN — GLYCOPYRROLATE AND FORMOTEROL FUMARATE 2 PUFF: 9; 4.8 AEROSOL, METERED RESPIRATORY (INHALATION) at 09:52

## 2020-10-28 RX ADMIN — CLOPIDOGREL BISULFATE 75 MG: 75 TABLET ORAL at 08:42

## 2020-10-28 RX ADMIN — Medication 1000 UNITS: at 08:41

## 2020-10-28 RX ADMIN — HYDROCODONE BITARTRATE AND ACETAMINOPHEN 2 TABLET: 5; 325 TABLET ORAL at 04:58

## 2020-10-28 RX ADMIN — GABAPENTIN 400 MG: 400 CAPSULE ORAL at 13:39

## 2020-10-28 ASSESSMENT — PAIN DESCRIPTION - ONSET: ONSET: ON-GOING

## 2020-10-28 ASSESSMENT — PAIN DESCRIPTION - LOCATION: LOCATION: GENERALIZED;NECK

## 2020-10-28 ASSESSMENT — PAIN DESCRIPTION - ORIENTATION: ORIENTATION: RIGHT

## 2020-10-28 ASSESSMENT — PAIN DESCRIPTION - PAIN TYPE: TYPE: CHRONIC PAIN;SURGICAL PAIN

## 2020-10-28 ASSESSMENT — PAIN DESCRIPTION - PROGRESSION: CLINICAL_PROGRESSION: NOT CHANGED

## 2020-10-28 ASSESSMENT — PAIN DESCRIPTION - DESCRIPTORS: DESCRIPTORS: SORE;ACHING

## 2020-10-28 ASSESSMENT — PAIN SCALES - GENERAL
PAINLEVEL_OUTOF10: 6
PAINLEVEL_OUTOF10: 8
PAINLEVEL_OUTOF10: 7

## 2020-10-28 ASSESSMENT — PAIN DESCRIPTION - FREQUENCY: FREQUENCY: CONTINUOUS

## 2020-10-28 ASSESSMENT — PAIN - FUNCTIONAL ASSESSMENT: PAIN_FUNCTIONAL_ASSESSMENT: ACTIVITIES ARE NOT PREVENTED

## 2020-10-28 NOTE — OP NOTE
[id number] [facility] [address]                [address], [address] [place] [address]                                OPERATIVE REPORT    PATIENT NAME: Levon Lindsey                        :        1955  MED REC NO:   [id number]                             ROOM:         ACCOUNT NO:   [de-identified]                           ADMIT DATE: 10/27/2020  PROVIDER:     Hawk Allen MD    DATE OF PROCEDURE:  10/27/2020    PREOPERATIVE DIAGNOSIS:  Severe 95% right internal carotid artery  stenosis. POSTOPERATIVE DIAGNOSIS:  Severe 95% right internal carotid artery  stenosis. PROCEDURE PERFORMED:  Right carotid endarterectomy with Vascu-Guard  patch. SURGEON:  Davide Mcgee. My Dasilva MD    ANESTHESIA:  General endotracheal.    ESTIMATED BLOOD LOSS:  50 mL. COMPLICATIONS:  None. OPERATIVE INDICATIONS:  The patient is a 43-year-old female, who  presents with severe right carotid stenosis by duplex, which was  confirmed by CTA. At this time, she is being taken to the operating  room for elective right carotid endarterectomy. OPERATIVE TECHNIQUE:  After informed consent had been obtained, the  patient was brought to the operating room, placed in supine position and  general endotracheal anesthesia was induced. The right neck was then  prepped and draped in usual sterile fashion. Oblique incision was then  made in the right neck and subcutaneous tissues were sharply taken down. Bleeding points were being controlled with electrocautery. The platysma  was divided and the anterior border of the sternocleidomastoid was  dissected free. The internal jugular vein was readily identified and  the common facial vein was suture ligated with 3-0 Vicryl and divided. Carotid bifurcation was isolated and then the common internal and  external carotid arteries were circumferentially dissected and vessels  were placed around them. Superior thyroid artery was secured with 0  silk Jarvis tie. Then the patient was intravenously heparinized with  6000 units of aqueous heparin. The external carotid artery was then  clamped with a Ace followed by the internal carotid artery. Common  carotid artery was clamped with a Green clamp. Longitudinal  arteriotomy was then made on the common carotid and extended onto the  internal carotid artery. There was extensive plaque present within the  bifurcation of proximal internal carotid where at least 95% stenosis was  present with several areas of ulceration. Vessels were irrigated with  heparinized saline and a 3 x 5 mm Sundt shunt was placed and the ends  secured with Scooby clamps. Flow was reestablished to the brain. Next,  the carotid bifurcation was endarterectomized in a standard fashion with  excellent distal taper points at both the internal and external carotid  arteries. Vessels were irrigated with heparinized saline and small  debris fragments were removed. Vascu-Guard patches were then brought on  the field and fashioned. The arteriotomy was closed in a patch  angioplasty fashion using running 6-0 Prolene suture. Prior to  completing the closure, the shunt was clamped and removed. Carotid was  irrigated with heparinized saline and the closure completed. At this  time, flow was reestablished first to the external carotid artery  followed by the internal carotid artery. Thrombin and Gelfoam was  placed at the closure site. Doppler was used to interrogate both the  internal and external carotid arteries and excellent phasic flow was  noted. A 10 mg of protamine was given intravenously in order to reverse  the remaining heparin. Wound was then irrigated with bacitracin  solution and Gelfoam removed. Suture line was inspected and found to be  hemostatic. Further irrigation was carried out and the platysma was  reapproximated using running 3-0 Vicryl suture followed by closure of  the skin with interrupted 4-0 Monocryl subcuticular suture.

## 2020-10-28 NOTE — DISCHARGE SUMMARY
VASCULAR SURGERY   DISCHARGE SUMMARY      Patient Identification  Shaka Monet is a 60 y.o. female. :  1955  Admit Date:  10/27/2020    Discharge date:   No discharge date for patient encounter. Disposition: home    Discharge Diagnoses:   Patient Active Problem List   Diagnosis    Chronic obstructive pulmonary disease ([address]    Essential hypertension    GERD (gastroesophageal reflux disease)    CAD (coronary artery disease)    Depression    Anxiety    Chronic use of opiate drugs therapeutic purposes    MI, old    Diverticulosis of colon    Cervical spondylosis    RLS (restless legs syndrome)    Hx of fusion of cervical spine    Cervical spondylosis with radiculopathy    Fibrocystic breast changes of both breasts    Umbilical hernia without obstruction and without gangrene    Lumbosacral spondylosis without myelopathy    Mixed hyperlipidemia    Chronic bilateral low back pain without sciatica    Hormone imbalance    Epigastric pain    Iron deficiency anemia    Colon polyp    Other irritable bowel syndrome    Smoker    Carotid stenosis, bilateral    Carotid stenosis, right         Consults: IM    Surgery: Right carotid endarterectomy with Vascu-Guard patch    Patient Instructions: Activity: no heavy lifting, pushing, pulling for 6 weeks, no driving for 2 weeks or while on analgesics  Diet: As tolerated  Follow-up with Tito Yu MD in 3-4 weeks. See pre-printed instructions in chart and given to patient upon discharge. Discharge Medications:      [facility] Medication Instructions DL    Printed on:10/28/20 0971   Medication Information                      ALPRAZolam (XANAX) 1 MG tablet  Take by mouth as needed .              aspirin 325 MG tablet  Take 325 mg by mouth as needed for Pain             BEVESPI AEROSPHERE 9-4.8 MCG/ACT AERO  INHALE 2 PUFFS BY MOUTH INTO THE LUNGS TWICE DAILY buPROPion (WELLBUTRIN XL) 150 MG extended release tablet  Take 300 mg by mouth daily Indications: 150 mg x2 daily Taking 400 mg daily (2 of 200s)             cetirizine (ZYRTEC) 10 MG tablet  TAKE 1 TABLET BY MOUTH DAILY             Cholecalciferol (VITAMIN D3) 1000 units TABS  TAKE 1 TABLET BY MOUTH DAILY             clopidogrel (PLAVIX) 75 MG tablet  Take 75 mg by mouth daily             DULoxetine (CYMBALTA) 60 MG extended release capsule  Take 1 capsule by mouth daily             famotidine (PEPCID) 40 MG tablet  Take 40 mg by mouth nightly              FLOVENT  MCG/ACT inhaler  INHALE 2 PUFFS INTO THE LUNGS TWICE DAILY             gabapentin (NEURONTIN) 400 MG capsule  TAKE 1 CAPSULE BY MOUTH THREE TIMES DAILY             HYDROcodone-acetaminophen (NORCO) 7.5-325 MG per tablet  Take 1 tablet by mouth every 6 hours as needed for Pain for up to 30 days. To be filled 7/24/19             ipratropium-albuterol (DUONEB) 0.5-2.5 (3) MG/3ML SOLN nebulizer solution  INHALE THE CONTENTS OF 1 VIAL (3ML) BY MOUTH AND INTO THE LUNGS ONCE EVERY 6 HOURS AS NEEDED FOR FOR SHORTNESS OF BREATH             meloxicam (MOBIC) 15 MG tablet  TAKE 1 TABLET BY MOUTH DAILY             mometasone (ELOCON) 0.1 % cream  APPLY TOPICALLY TO THE AFFECTED AREA(S) DAILY             Nebulizers (COMPRESSOR/NEBULIZER) MISC  Use with albuterol solution every 6 hours as needed.              NIFEdipine (PROCARDIA XL) 60 MG extended release tablet  TAKE 1 TABLET BY MOUTH TWICE DAILY             omeprazole (PRILOSEC) 40 MG delayed release capsule  Take 1 capsule by mouth daily             RESTASIS 0.05 % ophthalmic emulsion  INSTILL 1 DROP INTO BOTH EYES TWICE DAILY             rOPINIRole (REQUIP) 3 MG tablet  TAKE 1 TABLET BY MOUTH EVERY NIGHT             VENTOLIN  (90 Base) MCG/ACT inhaler  INHALE 2 PUFFS INTO THE LUNGS EVERY 6 HOURS AS NEEDED FOR WHEEZING                  HPI and Hospital Course: 64-year-old female underwent a right carotid endarterectomy without incident. Normal postoperative course. She was discharged home on postoperative day #1 in good condition. She was instructed follow-up in the office in 3 to 4 weeks.         Stephen Fonseca MD FACS

## 2020-10-28 NOTE — PLAN OF CARE
Problem: Skin Integrity:  Goal: Will show no infection signs and symptoms  Description: Will show no infection signs and symptoms  Outcome: Met This Shift  Goal: Absence of new skin breakdown  Description: Absence of new skin breakdown  Outcome: Met This Shift     Problem: Safety:  Goal: Free from accidental physical injury  Description: Free from accidental physical injury  Outcome: Met This Shift     Problem: Pain:  Goal: Control of acute pain  Description: Control of acute pain  Outcome: Ongoing

## 2020-10-28 NOTE — FLOWSHEET NOTE
RN reviewed discharge instructions with the patient at the bedside. Patient verbalizes understanding. Patient discharged with all belongings to main entrance via wheelchair, accompanied by daughter and unit huc/pct.

## 2020-10-28 NOTE — FLOWSHEET NOTE
Dr. Christiano Bernal arrived to the patient's bedside for evaluation and was updated on the patient's current status via RN. Surgical dressing removed from patient's right neck. Site remains C, D, & I and no hematoma present. Surgical glue in place and site remains ARELI. Patient educated on incision site care. Patient is okay for discharge from vascular standpoint. Follow-up in 3 weeks.

## 2020-10-28 NOTE — PROGRESS NOTES
[facility]  Office: [address], DO, Stefania Laurent, DO, Arlene Naqvimeredith, DO, Moose Martins Blood, DO, Baldo Davidson MD, Sheldon Reza MD, Carmelina Luz MD, Rossy Phelps MD, Lindy Tierney MD, Kay Lyon MD, Gena Huynh MD, Shima Childers MD, Mbonu Dusty Babinski, MD, Henry Carter DO, Ariel Padilla MD, Luzma Gan MD, Jackie Neff DO, Deidre Ruff MD,  Jordi Soliman DO, Alicia Armas MD, Jean Bains MD, Edda Esparza, [facility], [facility], CNP, Lucy Ortez, CNP, Brett Braun, CNS, Mathew Garcia, CNP, Rodney Sullivan, [facility], Gilberto Lorenzo, CNP, Cecy Michaels, CNP, Solo Rich, CNP, Isidoro Archibald PA-C, Kiesha Obregon, [facility], Waylon Dela Cruz, CNP, Philip Craig, CNP, Matias Ramos, CNP, Jose Francisco Bryant, CNP, Albania Moore, [facility]    Progress Note    10/28/2020    8:57 AM    Name:   Juan C Lutz  MRN:     [id number]     [place]:      [de-identified]   Room:   2031/203101   Day:  1  Admit Date:  10/27/2020  8:58 AM    PCP:   Olga Hameed MD  Code Status:  Prior    Subjective:     C/C: Carotid disease    Interval History Status: improved. Pt seen and examined this morning. No acute events overnight. Complaining of soreness at her right neck incision site. She is about to eat breakfast. Vitals are stable. No nausea/vomiting/diarrhea. She does mention that she has had a pretty adverse reaction to statin therapies since initiation, including a feeling of bladder fullness/frequency. Brief History:     Patient presented today for a right carotid endarterectomy with Dr. GROSS [facility] due to severe 95% right carotid stenosis. She has an extensive medical history including bilateral carotid stenosis, COPD, hypertension, GERD, CAD, depression, anxiety, RLS, hyperlipidemia, and current tobacco use. She is seen in the postoperative period. She has no complaints at this time. Janey Stover appears to have tolerated the procedure well. Preoperative laboratory studies were reviewed. Derrick Akins is well controlled. She denies any concerns at this time. Review of Systems:     Constitutional:  negative for chills, fevers, sweats  Respiratory:  negative for cough, dyspnea on exertion, shortness of breath, wheezing  HENT: reports incisional pain  Cardiovascular:  negative for chest pain, chest pressure/discomfort, lower extremity edema, palpitations  Gastrointestinal:  negative for abdominal pain, constipation, diarrhea, nausea, vomiting  Neurological:  negative for dizziness, headache    Medications:      Allergies:  No Known Allergies    Current Meds:   Scheduled Meds:    DULoxetine  120 mg Oral Daily    buPROPion  300 mg Oral Daily    pantoprazole  40 mg Oral QAM AC    gabapentin  400 mg Oral TID    meloxicam  15 mg Oral Daily    Vitamin D  1 tablet Oral Daily    NIFEdipine  60 mg Oral BID    glycopyrrolate-formoterol  2 puff Inhalation Daily    cetirizine  10 mg Oral Daily    famotidine  40 mg Oral Nightly    clopidogrel  75 mg Oral Daily     Continuous Infusions:    lactated ringers Stopped (10/28/20 0200)     PRN Meds: ALPRAZolam, albuterol sulfate HFA, ipratropium-albuterol, HYDROcodone 5 mg - acetaminophen **OR** HYDROcodone 5 mg - acetaminophen, fluocinonide    Data:     Past Medical History:   has a past medical history of Anxiety, CAD (coronary artery disease), Carotid artery stenosis, Cataract, CHF (congestive heart failure) ([address] Chronic back pain, Colon polyp, COPD (chronic obstructive pulmonary disease) ([address] Depression, Esophageal reflux, Fibromyalgia, Head injury, Headache(784.0), Hearing loss, Heart attack ([address] Hyperlipidemia, Hypertension, Insomnia, Iron deficiency anemia, MARTHA (obstructive sleep apnea), Osteoarthritis, Pneumonia, Restless leg syndrome, Short-term memory loss, Shortness of breath, TIA (transient ischemic attack), Tinnitus, Ulcerative colitis ([address] and Unspecified cerebral artery occlusion with cerebral infarction. Social History:   reports that she has been smoking cigarettes. She has a 20.00 pack-year smoking history. She has quit using smokeless tobacco. She reports current alcohol use. She reports that she does not use drugs. Family History:   Family History   Problem Relation Age of Onset    Diabetes Mother     Alzheimer's Disease Mother     High Blood Pressure Father     Diabetes Sister     High Blood Pressure Sister     High Blood Pressure Brother     Diabetes Maternal Grandmother        Vitals:  /74   Pulse 90   Temp 97.4 °F (36.3 °C) (Temporal)   Resp 18   Ht 5' 1\" (1.549 m)   Wt 160 lb (72.6 kg)   SpO2 90%   BMI 30.23 kg/m²   Temp (24hrs), Av.1 °F (36.2 °C), Min:96.3 °F (35.7 °C), Max:98.4 °F (36.9 °C)    No results for input(s): POCGLU in the last 72 hours. I/O (24Hr): Intake/Output Summary (Last 24 hours) at 10/28/2020 0857  Last data filed at 10/28/2020 0644  Gross per 24 hour   Intake 3463 ml   Output 1580 ml   Net 1883 ml       Labs:  Hematology:  Recent Labs     10/28/20  0745   WBC 9.3   RBC 3.62*   HGB 10.7*   HCT 34.1*   MCV 94.2   MCH 29.6   MCHC 31.4   RDW 12.5      MPV 8.5     Chemistry:  Recent Labs     10/28/20  0745      K 4.1      CO2 31   GLUCOSE 111*   BUN 11   CREATININE 0.63   ANIONGAP 6*   LABGLOM >60   GFRAA >60   CALCIUM 9.0   No results for input(s): PROT, LABALBU, LABA1C, L4ABIDP, U5JNMMZ, FT4, TSH, AST, ALT, LDH, GGT, ALKPHOS, LABGGT, BILITOT, BILIDIR, AMMONIA, AMYLASE, LIPASE, LACTATE, CHOL, HDL, LDLCHOLESTEROL, CHOLHDLRATIO, TRIG, VLDL, UEQ17KF, PHENYTOIN, PHENYF, URICACID, POCGLU in the last 72 hours.   ABG:No results found for: POCPH, PHART, PH, POCPCO2, MTE6DAI, PCO2, POCPO2, PO2ART, PO2, POCHCO3, CNM3MHB, HCO3, NBEA, PBEA, BEART, BE, THGBART, THB, SZF3DAU, QCAL4BYV, J6BPGZWO, O2SAT, FIO2  Lab Results   Component Value Date/Time    SPECIAL NOT REPORTED 10/06/2016 10:12 PM     No results found for: CULTURE    Radiology:  No results found. Physical Examination:        General appearance:  alert, cooperative and no distress  Mental Status:  oriented to person, place and time and normal affect  HENT: right-sided neck incision which is c/d/i, no scleral icterus  Lungs:  clear to auscultation bilaterally, normal effort  Heart:  regular rate and rhythm, no murmur  Abdomen:  soft, nontender, nondistended, normal bowel sounds, no masses, hepatomegaly, splenomegaly  Extremities:  no edema, redness, tenderness in the calves  Neuro: 5/5  strength bilaterally, 5/5 muscle strength with bilateral dorsiflexion and plantarflexion  Skin:  no gross lesions, rashes, induration    Assessment:        Hospital Problems           Last Modified POA    * (Principal) Carotid stenosis, bilateral (Chronic) 10/28/2020 Yes    Carotid stenosis, right 10/28/2020 Yes    Chronic obstructive pulmonary disease ([address] 10/28/2020 Yes    Essential hypertension 10/28/2020 Yes    GERD (gastroesophageal reflux disease) 10/28/2020 Yes    CAD (coronary artery disease) 10/28/2020 Yes    Overview Signed 4/8/2015 11:55 PM by TAMI No NP     Normal stress test without any evidence of ischemia 4/2015         Depression 10/28/2020 Yes    Anxiety 10/28/2020 Yes    RLS (restless legs syndrome) 10/28/2020 Yes    Mixed hyperlipidemia 10/28/2020 Yes    Smoker 10/28/2020 Yes          Plan:        1. Your continued vascular surgical care  2. AM labs are currently pending  3. Repeat hemoglobin in 2 weeks with PCP  4. I have also instructed her to follow up with her PCP to discuss discontinuation of statin therapy secondary to    5. Continue anti-hypertensive therapy  6. Continue other home meds, including bevespi, wellbutrin, xanax  7. Smoking cessation stressed  8. If Bmp returns wnl, no objection for discharge later today from an internal medicine standpoint    Thank you for allowing me to be a part of your patient's care.     JACKY [address] [facility],   10/28/2020  8:57 AM

## 2020-10-28 NOTE — PROGRESS NOTES
VASCULAR SURGERY  PROGRESS NOTE  POST-OP CAROTID ENDARTERECTOMY    10/28/2020  1:51 PM     Gail Jackson    1955   [id number]        SUBJECTIVE:  Patient awake and alert. No complaints. Good pain control. Denies nausea. OBJECTIVE    Physical  VITALS:  BP (!) 159/80   Pulse 90   Temp 96.9 °F (36.1 °C) (Temporal)   Resp 16   Ht 5' 1\" (1.549 m)   Wt 160 lb (72.6 kg)   SpO2 90%   BMI 30.23 kg/m²     CONSTITUTIONAL:  awake, alert, cooperative, no apparent distress and appears stated age  NECK: Incision clean and dry with no unexpected swelling. NEUROLOGIC:  Mental status unchanged. Motor and sensory function intact. Tongue midline. Data  Hemoglobin   Date/Time Value Ref Range Status   10/28/2020 07:45 AM 10.7 (L) 11.9 - 15.1 g/dL Final     Hematocrit   Date/Time Value Ref Range Status   10/28/2020 07:45 AM 34.1 (L) 36.3 - 47.1 % Final     Sodium   Date/Time Value Ref Range Status   10/28/2020 07:45  135 - 144 mmol/L Final     Potassium   Date/Time Value Ref Range Status   10/28/2020 07:45 AM 4.1 3.7 - 5.3 mmol/L Final     Chloride   Date/Time Value Ref Range Status   10/28/2020 07:45  98 - 107 mmol/L Final     CO2   Date/Time Value Ref Range Status   10/28/2020 07:45 AM 31 20 - 31 mmol/L Final     BUN   Date/Time Value Ref Range Status   10/28/2020 07:45 AM 11 8 - 23 mg/dL Final       ASSESSMENT AND PLAN    60 y.o. female doing well status post Right Carotid Endarterectomy       Plan home today. Follow-up in Office. Continue Plavix and aspirin. Call for problems.     Electronically signed by Merlene Breaux MD on 10/28/2020 at 1:51 PM

## 2020-10-28 NOTE — CARE COORDINATION
Case Management Initial Discharge Plan  Giuseppe Andre,             Met with:patient to discuss discharge plans. Information verified: address, contacts, phone number, , insurance Yes    Emergency Contact/Next of Kin name & number: Suzie/daughter  [phone]    PCP: Sree Velarde MD  Date of last visit: 3 weeks ago    Insurance Provider: [employer]    Discharge Planning    Living Arrangements:  Alone   Support Systems:  Family Members, Laney Zepeda has 1 stories  5 stairs to climb to get into front door, 0stairs to climb to reach second floor  Location of bedroom/bathroom in home main    Patient able to perform ADL's:Independent    Current Services (outpatient & in home) none  DME equipment: 0  DME provider: 0    Receiving oral anticoagulation therapy? No    If indicated:   Physician managing anticoagulation treatment:   Where does patient obtain lab work for ATC treatment? Potential Assistance Needed:  N/A    Patient agreeable to home care: No  [place] of choice provided:  n/a    Prior SNF/Rehab Placement and Facility: n/a  Agreeable to SNF/Rehab: No  [place] of choice provided: n/a     Evaluation: no    Expected Discharge date:  10/28/20    Patient expects to be discharged to:  private residence  Follow Up Appointment: Best Day/ Time: Wednesday PM    Transportation provider: Danyelle Pandey  Transportation arrangements needed for discharge: No    Readmission Risk              Risk of Unplanned Readmission:        7             Does patient have a readmission risk score greater than 14?: No  If yes, follow-up appointment must be made within 7 days of discharge. Goals of Care: post op pain      Discharge Plan: Patient came in for dizziness. Currently is post op for Endartectomy. Alert and oriented and independent. Has seen Dr. Cricket Schuler for thyroid and seen PCP 3 weeks ago. Pharmacy is [facility]. Patient drives and does not use any DME.    Plan: home

## 2020-11-09 ENCOUNTER — HOSPITAL ENCOUNTER (OUTPATIENT)
Age: 65
Discharge: HOME | End: 2020-11-11
Payer: MEDICARE

## 2020-11-09 ENCOUNTER — HOSPITAL ENCOUNTER (OUTPATIENT)
Dept: GENERAL RADIOLOGY | Age: 65
Discharge: HOME | End: 2020-11-11
Payer: MEDICARE

## 2020-11-09 PROCEDURE — 71046 X-RAY EXAM CHEST 2 VIEWS: CPT

## 2020-12-04 ENCOUNTER — VIRTUAL VISIT (OUTPATIENT)
Dept: GASTROENTEROLOGY | Age: 65
End: 2020-12-04
Payer: MEDICARE

## 2020-12-04 ENCOUNTER — TELEPHONE (OUTPATIENT)
Dept: GASTROENTEROLOGY | Age: 65
End: 2020-12-04

## 2020-12-04 PROBLEM — K59.01 SLOW TRANSIT CONSTIPATION: Status: ACTIVE | Noted: 2020-12-04

## 2020-12-04 PROCEDURE — G8484 FLU IMMUNIZE NO ADMIN: HCPCS | Performed by: INTERNAL MEDICINE

## 2020-12-04 PROCEDURE — 4004F PT TOBACCO SCREEN RCVD TLK: CPT | Performed by: INTERNAL MEDICINE

## 2020-12-04 PROCEDURE — G8417 CALC BMI ABV UP PARAM F/U: HCPCS | Performed by: INTERNAL MEDICINE

## 2020-12-04 PROCEDURE — 3017F COLORECTAL CA SCREEN DOC REV: CPT | Performed by: INTERNAL MEDICINE

## 2020-12-04 PROCEDURE — 99214 OFFICE O/P EST MOD 30 MIN: CPT | Performed by: INTERNAL MEDICINE

## 2020-12-04 PROCEDURE — G8427 DOCREV CUR MEDS BY ELIG CLIN: HCPCS | Performed by: INTERNAL MEDICINE

## 2020-12-04 ASSESSMENT — ENCOUNTER SYMPTOMS
WHEEZING: 1
TROUBLE SWALLOWING: 0
CHOKING: 1
COUGH: 1
RECTAL PAIN: 1

## 2020-12-04 NOTE — TELEPHONE ENCOUNTER
Follow up after VV today - No labs, imaging or procedures ordered at this visit, recall letter placed in chart for 8 month office visit.

## 2020-12-04 NOTE — PROGRESS NOTES
GI TELEPHONE VISIT FOLLOW UP PATIENT    Gail Jackson is a 60 y.o. female evaluated via on 12/4/2020. Consent:  She and/or health care decision maker is aware that that she may receive a bill for this telephone service, depending on her insurance coverage, and has provided verbal consent to proceed: YES      INTERVAL HISTORY:   No referring provider defined for this encounter. Chief Complaint   Patient presents with    Gastroesophageal Reflux     Patient notes she is doing fine \"no stress, no upset stomach\"        No diagnosis found. She underwent EGD and colonoscopy by myself in the past  Has some vascular issues with carotids and had the surgery done  She had esophagitis gastritis  Feels ok while taking meds  Had tubular adenoma  Some redundant colon with diverticulosis and hemorrhoids  Has been on stools softeners   The significance of the results were informed to the patient  She is still smoking  HISTORY OF PRESENT ILLNESS: Ms.Sue Ashwin Mccord is a 60 y.o. female with a past history remarkable for , referred for evaluation of   Chief Complaint   Patient presents with    Gastroesophageal Reflux     Patient notes she is doing fine \"no stress, no upset stomach\"    . Past Medical,Family, and Social History reviewed and does contribute to the patient presenting condition. Patient's PMH/PSH,SH,PSYCH Hx, MEDs, ALLERGIES, and ROS were all reviewed and updated in the appropriate sections.     PAST MEDICAL HISTORY:  Past Medical History:   Diagnosis Date    Anxiety     CAD (coronary artery disease)     Carotid artery stenosis     Cataract     CHF (congestive heart failure) (HCC)     Chronic back pain     on 5/30/19 pt states is presently in pain mgmnt    Colon polyp 02/26/2020    tubular adenoma    COPD (chronic obstructive pulmonary disease) (HCC)     Depression     Esophageal reflux     Fibromyalgia     Head MD Jose at [address] ENDOSCOPY  5/2012    normal    UPPER GASTROINTESTINAL ENDOSCOPY  7 17 15    biopsy, pathology-mild chronic inflammation    UPPER GASTROINTESTINAL ENDOSCOPY  02/26/2020    with biopsies     UPPER GASTROINTESTINAL ENDOSCOPY  2/26/2020    EGD BIOPSY performed by Modesto Jovel MD at [address] [place] [address] Blvd:    Current Outpatient Medications:     clopidogrel (PLAVIX) 75 MG tablet, Take 75 mg by mouth daily, Disp: , Rfl:     famotidine (PEPCID) 40 MG tablet, Take 40 mg by mouth nightly , Disp: , Rfl:     cetirizine (ZYRTEC) 10 MG tablet, TAKE 1 TABLET BY MOUTH DAILY, Disp: 90 tablet, Rfl: 1    RESTASIS 0.05 % ophthalmic emulsion, INSTILL 1 DROP INTO BOTH EYES TWICE DAILY, Disp: 60 each, Rfl: 5    rOPINIRole (REQUIP) 3 MG tablet, TAKE 1 TABLET BY MOUTH EVERY NIGHT, Disp: 30 tablet, Rfl: 5    ipratropium-albuterol (DUONEB) 0.5-2.5 (3) MG/3ML SOLN nebulizer solution, INHALE THE CONTENTS OF 1 VIAL (3ML) BY MOUTH AND INTO THE LUNGS ONCE EVERY 6 HOURS AS NEEDED FOR FOR SHORTNESS OF BREATH, Disp: 90 mL, Rfl: 5    Cholecalciferol (VITAMIN D3) 1000 units TABS, TAKE 1 TABLET BY MOUTH DAILY, Disp: 30 tablet, Rfl: 5    NIFEdipine (PROCARDIA XL) 60 MG extended release tablet, TAKE 1 TABLET BY MOUTH TWICE DAILY, Disp: 60 tablet, Rfl: 5    mometasone (ELOCON) 0.1 % cream, APPLY TOPICALLY TO THE AFFECTED AREA(S) DAILY, Disp: 30 g, Rfl: 1    BEVESPI AEROSPHERE 9-4.8 MCG/ACT AERO, INHALE 2 PUFFS BY MOUTH INTO THE LUNGS TWICE DAILY, Disp: 10.7 g, Rfl: 3    meloxicam (MOBIC) 15 MG tablet, TAKE 1 TABLET BY MOUTH DAILY, Disp: 30 tablet, Rfl: 1    omeprazole (PRILOSEC) 40 MG delayed release capsule, Take 1 capsule by mouth daily (Patient taking differently: Take 40 mg by mouth 2 times daily ), Disp: 90 capsule, Rfl: 1    aspirin 325 MG tablet, Take 325 mg by mouth as needed for Pain, Disp: , Rfl:     VENTOLIN  (90 Base) MCG/ACT inhaler, INHALE 2 PUFFS INTO THE LUNGS EVERY 6 HOURS AS NEEDED FOR WHEEZING, Disp: 18 g, Rfl: 5    buPROPion (WELLBUTRIN XL) 150 MG extended release tablet, Take 300 mg by mouth daily Indications: 150 mg x2 daily Taking 400 mg daily (2 of 200s), Disp: , Rfl:     FLOVENT  MCG/ACT inhaler, INHALE 2 PUFFS INTO THE LUNGS TWICE DAILY (Patient taking differently: INHALE 2 PUFFS INTO THE LUNGS TWICE DAILY PRN), Disp: 12 g, Rfl: 5    ALPRAZolam (XANAX) 1 MG tablet, Take by mouth as needed . , Disp: , Rfl:     DULoxetine (CYMBALTA) 60 MG extended release capsule, Take 1 capsule by mouth daily (Patient taking differently: Take 120 mg by mouth daily ), Disp: 30 capsule, Rfl: 5    Nebulizers (COMPRESSOR/NEBULIZER) MISC, Use with albuterol solution every 6 hours as needed. , Disp: 1 each, Rfl: 0    HYDROcodone-acetaminophen (NORCO) 7.5-325 MG per tablet, Take 1 tablet by mouth every 6 hours as needed for Pain for up to 30 days.  To be filled 7/24/19, Disp: 120 tablet, Rfl: 0    gabapentin (NEURONTIN) 400 MG capsule, TAKE 1 CAPSULE BY MOUTH THREE TIMES DAILY, Disp: 90 capsule, Rfl: 1    ALLERGIES:   No Known Allergies    FAMILY HISTORY:       Problem Relation Age of Onset    Diabetes Mother     Alzheimer's Disease Mother     High Blood Pressure Father     Diabetes Sister     High Blood Pressure Sister     High Blood Pressure Brother     Diabetes Maternal Grandmother          SOCIAL HISTORY:   Social History     Socioeconomic History    Marital status:      Spouse name: Not on file    Number of children: Not on file    Years of education: Not on file    Highest education level: Not on file   Occupational History    Not on file   Social Needs    Financial resource strain: Not on file    Food insecurity     Worry: Not on file     Inability: Not on file    Transportation needs     Medical: Not on file     Non-medical: Not on file   Tobacco Use    Smoking status: Current Every Day Smoker     Packs/day: 0.50 Years: 40.00     Pack years: 20.00     Types: Cigarettes    Smokeless tobacco: Former User   Substance and Sexual Activity    Alcohol use: Yes     Alcohol/week: 0.0 standard drinks     Comment: occasional beer (once every 6 months)    Drug use: No    Sexual activity: Not on file   Lifestyle    Physical activity     Days per week: Not on file     Minutes per session: Not on file    Stress: Not on file   Relationships    Social connections     Talks on phone: Not on file     Gets together: Not on file     Attends Faith service: Not on file     Active member of club or organization: Not on file     Attends meetings of clubs or organizations: Not on file     Relationship status: Not on file    Intimate partner violence     Fear of current or ex partner: Not on file     Emotionally abused: Not on file     Physically abused: Not on file     Forced sexual activity: Not on file   Other Topics Concern    Not on file   Social History Narrative    Not on file         REVIEW OF SYSTEMS:         Review of Systems   Constitutional: Positive for fatigue. Negative for appetite change. HENT: Negative for trouble swallowing. Respiratory: Positive for cough (bronchitis), choking and wheezing. Gastrointestinal: Positive for rectal pain (hemorrhoids). Endocrine: Negative. Genitourinary: Negative. Musculoskeletal: Negative. Skin: Negative. Neurological: Negative. Hematological: Negative. Psychiatric/Behavioral: Negative.           LABORATORY DATA: Reviewed  Lab Results   Component Value Date    WBC 9.3 10/28/2020    HGB 10.7 (L) 10/28/2020    HCT 34.1 (L) 10/28/2020    MCV 94.2 10/28/2020     10/28/2020     10/28/2020    K 4.1 10/28/2020     10/28/2020    CO2 31 10/28/2020    BUN 11 10/28/2020    CREATININE 0.63 10/28/2020    LABPROT 7.2 12/12/2012    LABALBU 4.4 09/17/2019    BILITOT 0.17 (L) 09/17/2019    ALKPHOS 88 09/17/2019    AST 17 09/17/2019    ALT 15 09/17/2019 INR 1.0 10/14/2020         Lab Results   Component Value Date    RBC 3.62 (L) 10/28/2020    HGB 10.7 (L) 10/28/2020    MCV 94.2 10/28/2020    MCH 29.6 10/28/2020    MCHC 31.4 10/28/2020    RDW 12.5 10/28/2020    MPV 8.5 10/28/2020    BASOPCT 1 10/28/2020    LYMPHSABS 2.25 10/28/2020    MONOSABS 0.69 10/28/2020    NEUTROABS 6.18 10/28/2020    EOSABS 0.03 10/28/2020    BASOSABS 0.08 10/28/2020         DIAGNOSTIC TESTING:     Xr Chest (2 Vw)    Result Date: 11/9/2020  EXAMINATION: TWO XRAY VIEWS OF THE CHEST 11/9/2020 10:15 am COMPARISON: PA and lateral chest October 14, 2020 and October 8, 2019. HISTORY: ORDERING SYSTEM PROVIDED HISTORY: Cough TECHNOLOGIST PROVIDED HISTORY: Reason for Exam: Pt eval for cough and congestion Acuity: Unknown Type of Exam: Unknown FINDINGS: The patient's underwire bra was not removed. There are a few increased interstitial markings throughout the lung fields. No evidence of pneumothorax, pleural effusion, infiltrate, or abnormal lung mass. The heart is normal in size with some rounding of the left cardiac contour. Some spondylitic changes are present in the vertebral endplates. Stigmata of previous cervical spine surgery. Calcification just inferior to the right humeral head may represent a joint mouse. Mild senescent changes compatible with the age of the patient. Degenerative changes throughout the cervicothoracic spine and the visualized portions of the right shoulder. No evidence of acute cardiopulmonary process. Assessment  No diagnosis found.       VIRTUAL PHYSICAL EXAMINATION:     [ INSTRUCTIONS:  \"[x]\" Indicates a positive item  \"[]\" Indicates a negative item  -- DELETE ALL ITEMS NOT EXAMINED]  Vital Signs: (As obtained by patient/caregiver or practitioner observation)    Blood pressure-  Heart rate-    Respiratory rate-    Temperature-  Pulse oximetry-     Constitutional: [x] Appears well-developed and well-nourished [x] No apparent distress      [] Abnormal- Mental status  [] Alert and awake  [] Oriented to person/place/time []Able to follow commands      Eyes:  EOM    [x]  Normal  [] Abnormal-  Sclera  [x]  Normal  [] Abnormal -         Discharge [x]  None visible  [] Abnormal -    HENT:   [x] Normocephalic, atraumatic. [] Abnormal   [x] Mouth/Throat: Mucous membranes are moist.     External Ears [x] Normal  [] Abnormal-     Neck: [x] No visualized mass     Pulmonary/Chest: [x] Respiratory effort normal.  [x] No visualized signs of difficulty breathing or respiratory distress        [] Abnormal-      Musculoskeletal:   [x] Normal gait with no signs of ataxia         [x] Normal range of motion of neck        [] Abnormal-       Neurological:        [x] No Facial Asymmetry (Cranial nerve 7 motor function) (limited exam to video visit)          [x] No gaze palsy        [] Abnormal-         Skin:        [x] No significant exanthematous lesions or discoloration noted on facial skin         [] Abnormal-            Psychiatric:       [x] Normal Affect [x] No Hallucinations        [] Abnormal-     Other pertinent observable physical exam findings-         IMPRESSION: Ms. Jorge Schilder is a 60 y.o. female with     Pt seems to have signs and symptoms consistent with GERD, acid indigestion and heartburns. She was discussed  in detail about some possible life style and dietary modifications. She was stressed about the maintenance  of appropriate weight and effect of obesity contributing to reflux symptoms. Routine exercise was streesed. Avoidance of Caffeine, nicotine and chocolate were explained. Pt was asked to avoid spices grease and fried food. Advices were also given about avoidance of any kind of fast foods, soda pops and high energy drinks. Pt was advised to place two small block under the head end of the bed which may help with night time reflux.  Was advised not to eat any thin at least 2-3 hrs before going to bed and walk especially after dinner    Pt has verbalized understanding and agreement to this plan. Cont Famotidine    Pt was advised in detail about some life style and dietary modifications. She was advised about avoidance of caffeine, nicotine and chocolate. Pt was also told to stay away from any kind of fast foods, soda pops. She was also advised to avoid lots of spices, grease and fried food etc.     Instructions were also given about trying to arrange the timing, quality and quantity of food. Instructions were given about using ample amount of fiber including dietary and supplemental fiber either metamucil, bennafiber or citrucell etc.  Pt was advised about drinking ample amount of water without any colors or chemicals. Stress was given about regular exercise. Pt has verbalized understanding and agreement to these modifications. Pt was given instructions and advice in detail about the symptom of constipation. She was explained about avoidance of fast food, soda pops, cheese and red meat. Was also told to avoid sedatives narcotics and pain killers if possible. Pt was advised to start drinking ample amount of water and liquid. Was told to adapt and follow an exercise regimen. Instructions were given to increase the amount of fiber including dietary in terms of bran, cereals, whole wheat, brown bread etc. Was also instructed to start using supplemental fiber either Metamucil, citrucell or bennafiber with ample liquids. She was told to start drinking prune juice which is good for constipation. If symptoms don't resolve she will require medicines to assist with her symptoms    Pt has verbalized understanding and agreement to this plan.           The patient was instructed to start taking some OTC Probiotics products   These are available over the counter at the Pharmacy stores and Grocery stores  He was explained about the beneficial effects they have in the GI track  They will help to establish the good bacterial qi and will help with the digestion and bowel movements  The patient has verbalized understanding and agreement to this plan      More than half of patient's clinic visit time was spent in counseling about lifestyle and dietary modifications  Patient's  questions were answered in this regard as well  The patient has verbalized understanding and agreement     Diet/life style/natural hx /complication of the dx were all explained in details   Past medical, past surgical, social history, psychiatric history, medications or allergies, all reviewed and  updated    Иван Fierro is a 60 y.o. female being evaluated by a telephone encounter to address concerns as mentioned above. A caregiver was present when appropriate. Due to this being a TeleHealth encounter (During RAUML-97 public health emergency), evaluation of the following organ systems was limited: Vitals/Constitutional/EENT/Resp/CV/GI//MS/Neuro/Skin/Heme-Lymph-Imm. Pursuant to the emergency declaration under the [address] [place] [address], [address] [place] [address] and the [employer] and [employer] Act, this telephone was conducted with patient's (and/or legal guardian's) consent, to reduce the patient's risk of exposure to COVID-19 and provide necessary medical care. The patient (and/or legal guardian) has also been advised to contact this office for worsening conditions or problems, and seek emergency medical treatment and/or call 911 if deemed necessary. Services were provided through a video synchronous discussion virtually to substitute for in-person clinic visit. Patient and provider were located at their individual homes. -- on 12/4/2020 at 1:05 PM    Total Time: minutes: 21-30 minutes    Services were provided through a telephone synchronous discussion virtually to substitute for in-person clinic visit. Thank you for allowing me to participate in the care of Ms. Margie Todd.  For any further questions please do not

## 2021-02-23 ENCOUNTER — OFFICE VISIT (OUTPATIENT)
Dept: GASTROENTEROLOGY | Age: 66
End: 2021-02-23
Payer: MEDICARE

## 2021-02-23 VITALS
SYSTOLIC BLOOD PRESSURE: 131 MMHG | HEIGHT: 61 IN | HEART RATE: 100 BPM | WEIGHT: 168 LBS | BODY MASS INDEX: 31.72 KG/M2 | TEMPERATURE: 98.1 F | DIASTOLIC BLOOD PRESSURE: 86 MMHG

## 2021-02-23 DIAGNOSIS — D12.2 ADENOMATOUS POLYP OF ASCENDING COLON: ICD-10-CM

## 2021-02-23 DIAGNOSIS — R10.13 EPIGASTRIC PAIN: ICD-10-CM

## 2021-02-23 DIAGNOSIS — K57.30 DIVERTICULOSIS OF COLON: ICD-10-CM

## 2021-02-23 DIAGNOSIS — D50.8 OTHER IRON DEFICIENCY ANEMIA: ICD-10-CM

## 2021-02-23 DIAGNOSIS — F41.9 ANXIETY: ICD-10-CM

## 2021-02-23 DIAGNOSIS — F17.200 SMOKER: ICD-10-CM

## 2021-02-23 DIAGNOSIS — K58.8 OTHER IRRITABLE BOWEL SYNDROME: ICD-10-CM

## 2021-02-23 DIAGNOSIS — K21.9 GASTROESOPHAGEAL REFLUX DISEASE, UNSPECIFIED WHETHER ESOPHAGITIS PRESENT: ICD-10-CM

## 2021-02-23 DIAGNOSIS — K59.01 SLOW TRANSIT CONSTIPATION: Primary | ICD-10-CM

## 2021-02-23 PROCEDURE — 99213 OFFICE O/P EST LOW 20 MIN: CPT | Performed by: INTERNAL MEDICINE

## 2021-02-23 RX ORDER — PSYLLIUM HUSK/CALCIUM CARB 1 G-60 MG
1 CAPSULE ORAL 2 TIMES DAILY
Qty: 120 CAPSULE | Refills: 2 | Status: SHIPPED | OUTPATIENT
Start: 2021-02-23

## 2021-02-23 RX ORDER — GREEN TEA/HOODIA GORDONII 315-12.5MG
1 CAPSULE ORAL 2 TIMES DAILY
Qty: 60 TABLET | Refills: 0 | Status: SHIPPED | OUTPATIENT
Start: 2021-02-23 | End: 2021-04-06

## 2021-02-23 ASSESSMENT — ENCOUNTER SYMPTOMS
VOMITING: 0
RESPIRATORY NEGATIVE: 1
ABDOMINAL PAIN: 1
BLOOD IN STOOL: 0
ALLERGIC/IMMUNOLOGIC NEGATIVE: 1
DIARRHEA: 0
EYES NEGATIVE: 1
ABDOMINAL DISTENTION: 1
ANAL BLEEDING: 0
CONSTIPATION: 0
NAUSEA: 1
RECTAL PAIN: 0
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
## 2021-02-24 ENCOUNTER — TELEPHONE (OUTPATIENT)
Dept: GASTROENTEROLOGY | Age: 66
End: 2021-02-24

## 2021-02-24 DIAGNOSIS — K42.9 UMBILICAL HERNIA WITHOUT OBSTRUCTION AND WITHOUT GANGRENE: Primary | ICD-10-CM

## 2021-05-25 ENCOUNTER — TELEPHONE (OUTPATIENT)
Dept: GASTROENTEROLOGY | Age: 66
End: 2021-05-25

## 2021-05-25 NOTE — TELEPHONE ENCOUNTER
Patient was a late cancellation for 5/25/21. Also, decided to follow up with her referral for the hernia. Phone number given of [phone] for Dr Liam Cade. Will call back and reschedule after seeing him. Writer thanked and call ended.

## 2021-06-17 ENCOUNTER — HOSPITAL ENCOUNTER (OUTPATIENT)
Dept: ULTRASOUND IMAGING | Age: 66
Discharge: HOME | End: 2021-06-19
Payer: MEDICARE

## 2021-06-17 ENCOUNTER — HOSPITAL ENCOUNTER (OUTPATIENT)
Dept: MAMMOGRAPHY | Age: 66
Discharge: HOME | End: 2021-06-19
Payer: MEDICARE

## 2021-06-17 DIAGNOSIS — N64.4 BREAST PAIN, RIGHT: ICD-10-CM

## 2021-06-21 ENCOUNTER — TELEPHONE (OUTPATIENT)
Dept: ONCOLOGY | Age: 66
End: 2021-06-21

## 2021-06-24 ENCOUNTER — HOSPITAL ENCOUNTER (OUTPATIENT)
Dept: CT IMAGING | Age: 66
Discharge: HOME | End: 2021-06-26
Payer: MEDICARE

## 2021-06-24 DIAGNOSIS — Z87.891 PERSONAL HISTORY OF TOBACCO USE: ICD-10-CM

## 2021-06-24 DIAGNOSIS — F17.210 CIGARETTE SMOKER: ICD-10-CM

## 2021-06-24 PROCEDURE — 71271 CT THORAX LUNG CANCER SCR C-: CPT

## 2021-07-08 RX ORDER — MULTIVITAMIN/IRON/FOLIC ACID 18MG-0.4MG
TABLET ORAL
Qty: 60 TABLET | Refills: 3 | Status: SHIPPED | OUTPATIENT
Start: 2021-07-08 | End: 2022-01-12 | Stop reason: SDUPTHER

## 2021-10-11 ENCOUNTER — HOSPITAL ENCOUNTER (OUTPATIENT)
Age: 66
Discharge: HOME | End: 2021-10-13
Payer: MEDICARE

## 2021-10-11 ENCOUNTER — HOSPITAL ENCOUNTER (OUTPATIENT)
Dept: GENERAL RADIOLOGY | Age: 66
Discharge: HOME | End: 2021-10-13
Payer: MEDICARE

## 2021-10-11 DIAGNOSIS — M25.511 RIGHT SHOULDER PAIN, UNSPECIFIED CHRONICITY: ICD-10-CM

## 2021-10-11 PROCEDURE — 73030 X-RAY EXAM OF SHOULDER: CPT

## 2021-12-27 ENCOUNTER — TELEPHONE (OUTPATIENT)
Dept: GASTROENTEROLOGY | Age: 66
End: 2021-12-27

## 2022-01-12 RX ORDER — MULTIVITAMIN/IRON/FOLIC ACID 18MG-0.4MG
TABLET ORAL
Qty: 60 TABLET | Refills: 3 | Status: SHIPPED | OUTPATIENT
Start: 2022-01-12

## 2022-01-12 NOTE — TELEPHONE ENCOUNTER
Probtotics sent in per provider Dr Vignesh Goff. Ya Stanley with 3 refills enough to hold patient over until next appt.      Patient called and informed

## 2022-01-12 NOTE — TELEPHONE ENCOUNTER
Pt called in stating she received a letter cancelling her appt due to her insurance coverage. Writer reviewed chart and letter and adv that appt was cancelled due to a change in the providers schedule. Pt has been rescheduled for 3/15/2022 and adv that she is in need of a refill of her probiotic to get her through until her appt. Writer sp will send a note back to clinical for review.

## 2022-04-15 RX ORDER — L. ACIDOPHILUS/PECTIN, CITRUS 25MM-100MG
TABLET ORAL
Qty: 60 TABLET | Refills: 3 | Status: SHIPPED | OUTPATIENT
Start: 2022-04-15

## 2022-05-11 ENCOUNTER — HOSPITAL ENCOUNTER (OUTPATIENT)
Dept: MAMMOGRAPHY | Age: 67
Discharge: HOME | End: 2022-05-13
Payer: COMMERCIAL

## 2022-05-11 DIAGNOSIS — Z12.31 ENCOUNTER FOR SCREENING MAMMOGRAM FOR MALIGNANT NEOPLASM OF BREAST: ICD-10-CM

## 2022-05-11 PROCEDURE — 77063 BREAST TOMOSYNTHESIS BI: CPT

## 2022-05-17 ENCOUNTER — HOSPITAL ENCOUNTER (OUTPATIENT)
Dept: GENERAL RADIOLOGY | Age: 67
Discharge: HOME | End: 2022-05-19
Payer: COMMERCIAL

## 2022-05-17 ENCOUNTER — HOSPITAL ENCOUNTER (OUTPATIENT)
Dept: VASCULAR LAB | Age: 67
Discharge: HOME | End: 2022-05-17
Payer: COMMERCIAL

## 2022-05-17 ENCOUNTER — HOSPITAL ENCOUNTER (OUTPATIENT)
Dept: MAMMOGRAPHY | Age: 67
Discharge: HOME | End: 2022-05-19
Payer: COMMERCIAL

## 2022-05-17 ENCOUNTER — HOSPITAL ENCOUNTER (OUTPATIENT)
Age: 67
Discharge: HOME | End: 2022-05-19
Payer: COMMERCIAL

## 2022-05-17 DIAGNOSIS — Z78.0 POSTMENOPAUSAL: ICD-10-CM

## 2022-05-17 DIAGNOSIS — I65.22 OCCLUSION OF LEFT CAROTID ARTERY: ICD-10-CM

## 2022-05-17 DIAGNOSIS — M19.049 ARTHRITIS PAIN, HAND: ICD-10-CM

## 2022-05-17 PROCEDURE — 93880 EXTRACRANIAL BILAT STUDY: CPT

## 2022-05-17 PROCEDURE — 77080 DXA BONE DENSITY AXIAL: CPT

## 2022-05-17 PROCEDURE — 73130 X-RAY EXAM OF HAND: CPT

## 2022-06-02 ENCOUNTER — TELEPHONE (OUTPATIENT)
Dept: ONCOLOGY | Age: 67
End: 2022-06-02

## 2022-08-15 RX ORDER — L. ACIDOPHILUS/PECTIN, CITRUS 25MM-100MG
TABLET ORAL
Qty: 60 TABLET | Refills: 3 | OUTPATIENT
Start: 2022-08-15

## 2022-10-26 ENCOUNTER — HOSPITAL ENCOUNTER (EMERGENCY)
Age: 67
Discharge: HOME | End: 2022-10-27
Attending: EMERGENCY MEDICINE
Payer: COMMERCIAL

## 2022-10-26 VITALS
TEMPERATURE: 98.3 F | WEIGHT: 160 LBS | DIASTOLIC BLOOD PRESSURE: 77 MMHG | RESPIRATION RATE: 18 BRPM | HEIGHT: 61 IN | OXYGEN SATURATION: 94 % | HEART RATE: 95 BPM | BODY MASS INDEX: 30.21 KG/M2 | SYSTOLIC BLOOD PRESSURE: 128 MMHG

## 2022-10-26 DIAGNOSIS — S81.812A SKIN TEAR OF LEFT LOWER LEG WITHOUT COMPLICATION, INITIAL ENCOUNTER: Primary | ICD-10-CM

## 2022-10-26 PROCEDURE — 99283 EMERGENCY DEPT VISIT LOW MDM: CPT

## 2022-10-26 ASSESSMENT — PAIN - FUNCTIONAL ASSESSMENT: PAIN_FUNCTIONAL_ASSESSMENT: 0-10

## 2022-10-26 ASSESSMENT — PAIN SCALES - GENERAL: PAINLEVEL_OUTOF10: 3

## 2022-10-27 PROCEDURE — 6370000000 HC RX 637 (ALT 250 FOR IP): Performed by: EMERGENCY MEDICINE

## 2022-10-27 RX ORDER — CEPHALEXIN 500 MG/1
500 CAPSULE ORAL ONCE
Status: COMPLETED | OUTPATIENT
Start: 2022-10-27 | End: 2022-10-27

## 2022-10-27 RX ORDER — CEPHALEXIN 500 MG/1
500 CAPSULE ORAL 4 TIMES DAILY
Qty: 28 CAPSULE | Refills: 0 | Status: SHIPPED | OUTPATIENT
Start: 2022-10-27 | End: 2022-11-03

## 2022-10-27 RX ADMIN — CEPHALEXIN 500 MG: 500 CAPSULE ORAL at 01:27

## 2022-10-27 NOTE — ED NOTES
Wound to left lower anterior leg and wound on posterior heal cleaned and dressed. Pt tolerated without complication.       Mukul Nguyen RN  10/27/22 [id number]

## 2022-10-27 NOTE — DISCHARGE INSTRUCTIONS
Gently remove bandage after 7 days. Return to this emergency room immediately if your symptoms persist, worsen or if new ones form. Make sure you follow-up with your primary care doctor within the next 1-2 business days.

## 2022-10-27 NOTE — ED PROVIDER NOTES
EMERGENCY DEPARTMENT ENCOUNTER    Pt Name: Kimberly Preston  MRN: [id number]  [place] 1955  Date of evaluation: 10/27/22  CHIEF COMPLAINT       Chief Complaint   Patient presents with    Laceration     Left leg, cut her leg from a fall     HISTORY OF PRESENT ILLNESS   Patient is a 62-year-old female who presents to the ED in private vehicle after injuring left lower leg at home this evening. Patient tripped while walking into her house and scraped left lower leg on the ground. She has a V-shaped skin tear left lower leg. Bleeding well controlled. No other injuries reported. No head strike, no LOC. She went to 2 different pharmacies to find Steri-Strips to repair the laceration itself however they were out of stock. No other issues at this time. No fevers, chest pain, abdominal pain. Patient states tetanus is up-to-date. REVIEW OF SYSTEMS     Review of Systems   All other systems reviewed and are negative. PASTMEDICAL HISTORY     Past Medical History:   Diagnosis Date    Anxiety     CAD (coronary artery disease)     Carotid artery stenosis     Cataract     CHF (congestive heart failure) ([facility])     Chronic back pain     on 5/30/19 pt states is presently in pain mgmnt    Colon polyp 02/26/2020    tubular adenoma    COPD (chronic obstructive pulmonary disease) ([facility])     Depression     Esophageal reflux     Fibromyalgia     Head injury     Multiple head injuries from 0166-4190.     Headache(784.0)     Hearing loss     Pt wears hearing aids    Heart attack ([address] Unknown    2005    Hyperlipidemia     Hypertension     Insomnia     Iron deficiency anemia     MARTHA (obstructive sleep apnea)     Osteoarthritis     Pneumonia     \"years ago\" per patient (written 10/14/2020)    Restless leg syndrome     Short-term memory loss     Shortness of breath     cannot climb 2 flights of stairs or walk 2 city blocks without becoming SOB    TIA (transient ischemic attack) 2005    Tinnitus     Ulcerative colitis ([address] Unspecified cerebral artery occlusion with cerebral infarction 2005    TIA per patient     SURGICAL HISTORY       Past Surgical History:   Procedure Laterality Date    ANESTHESIA NERVE BLOCK Left 11/13/2017    lumbar epidural steroid injection  performed by Shanita Blanco MD at [place] [address] Left 1/29/2018    NERVE BLOCK LEFT CERVICAL MEDIAL BRANCH C3 TON C4 C5 performed by Shanita Blanco MD at [place] [address] Bilateral 4/15/2019    BILATERAL LUMBAR MEDIAL BRANCH NERVE BLOCK L2-L5 performed by Shanita Blanco MD at [place] [address] Bilateral 4/22/2019    BILATERAL LUMBAR MEDIAL BRANCH NERVE BLOCK L2-L5 performed by Shanita Blanco MD at [address] [place] [address]  2015    No stents placed.      CAROTID ENDARTERECTOMY Right 10/27/2020    RIGHT CAROTID ENDARTERECTOMY performed by René Dickey MD at [address] [place] [address]  5/2012    severe spasms in the sigmoid colon , diverticulosis    COLONOSCOPY  02/26/2020    tubular adenoma    COLONOSCOPY  2/26/2020    COLONOSCOPY POLYPECTOMY COLD BIOPSY performed by Richi Li MD at [address] [place] [address] bone reconstruction     EPIDURAL STEROID INJECTION Left 9/25/2017    EPIDURAL STEROID INJECTION LEFT L4 L5  performed by Shanita Blanco MD at [facility]  06/21/2014    lumbar CARLEY     LUMBAR SPINE SURGERY  05/05/2014    lumbar CARLEY     NECK SURGERY      cadaverbone placement    NECK SURGERY  06/07/2014    cervical CARLEY     NERVE BLOCK Left 4/7/14    CERVIAL NECK    NERVE BLOCK N/A 2/29/2016    LUMBAR CARLEY    NERVE BLOCK N/A 11/07/2016    LUMBAR CARLEY    NERVE BLOCK Left 11/13/2017    lumbar CARLEY    NERVE BLOCK Left 01/29/2018    C3-5    NERVE BLOCK Right 02/12/2018    cervical medial brach    NERVE BLOCK Right 10/15/2018    Lumbar CARLEY    OTHER SURGICAL HISTORY  12/15/2014    lumbar steroid injection    OTHER SURGICAL HISTORY  7-20-15    left cervical steroid injection    OTHER SURGICAL HISTORY  01-25-16    Bilateral sacroiliac joint injection     OTHER SURGICAL HISTORY  09/25/2017    Lumbar Epidural Steroid injection    OTHER SURGICAL HISTORY Left 08/20/2018    cervical facet injection C2-3 and 4-5    OTHER SURGICAL HISTORY  12/03/2018    NERVE RADIOFREQUENCY ABLATION LEFT CERVICAL MEDIAL BRANCH C3/TON/C4/C5 (Left )    NM INJ DX/THER AGNT PARAVERT FACET JOINT, CERV/THORAC, 1ST LEVEL Left 8/20/2018    LEFT CERVICAL FACET STEROID INJECTION C2/34 C3/4 C4/5 performed by Medardo [place] MD at [address] DX/THER [address], LUMBAR/SAC, 1ST LEVEL Bilateral 10/15/2018    NANCY LUMBAR FACET STEROID INJECTION performed by Medardo Cole MD at [address] [facility] Left 2/12/2018    NERVE BLOCK LEFT CERVICAL MEDIAL BRANCH C3/TON/C4/C5 performed by Medardo Cole MD at [facility] OFFICE/OUTPT [facility] Left 12/3/2018    NERVE RADIOFREQUENCY ABLATION LEFT CERVICAL MEDIAL BRANCH C3/TON/C4/C5 performed by Medardo [place] MD at [place] [address] Left 3/4/2019    NERVE RADIOFREQUENCY ABLATION LEFT CERVICAL   MEDIAL BRANCH  C3/TON/C4/C5 performed by Medardo Cole MD at [place] [address] Right 6/24/2019    RIGHT LUMBAR MEDIAL [facility]  L2-L5 performed by Medardo Cole MD at [place] [address]  5/2012    normal    UPPER GASTROINTESTINAL ENDOSCOPY  7 17 15    biopsy, pathology-mild chronic inflammation    UPPER GASTROINTESTINAL ENDOSCOPY  02/26/2020    with biopsies     UPPER GASTROINTESTINAL ENDOSCOPY  2/26/2020    EGD BIOPSY performed by Suman Raymundo MD at [facility]       Previous Medications    ALPRAZOLAM (XANAX) 1 MG TABLET    Take by mouth as needed .     ASPIRIN 81 MG EC TABLET    Take 81 mg by mouth daily    BEVESPI AEROSPHERE 9-4.8 MCG/ACT AERO    INHALE 2 PUFFS BY MOUTH INTO THE LUNGS TWICE DAILY    BUMETANIDE (BUMEX) 0.5 MG TABLET    Take 0.5 mg by mouth daily as needed    BUPROPION (WELLBUTRIN XL) 150 MG EXTENDED RELEASE TABLET    Take 300 mg by mouth daily Indications: 150 mg x2 daily Taking 400 mg daily (2 of 200s)    CETIRIZINE (ZYRTEC) 10 MG TABLET    TAKE 1 TABLET BY MOUTH DAILY    CHOLECALCIFEROL (VITAMIN D3) 1000 UNITS TABS    TAKE 1 TABLET BY MOUTH DAILY    CLOPIDOGREL (PLAVIX) 75 MG TABLET    Take 75 mg by mouth daily    COENZYME Q10 (COQ-10) 400 MG CAPS    Take 400 mg by mouth daily    DULOXETINE (CYMBALTA) 60 MG EXTENDED RELEASE CAPSULE    Take 1 capsule by mouth daily    FAMOTIDINE (PEPCID) 40 MG TABLET    Take 40 mg by mouth nightly     FERROUS SULFATE (IRON 325) 325 (65 FE) MG TABLET    Take 325 mg by mouth daily (with breakfast)    FLOVENT  MCG/ACT INHALER    INHALE 2 PUFFS INTO THE LUNGS TWICE DAILY    GABAPENTIN (NEURONTIN) 400 MG CAPSULE    TAKE 1 CAPSULE BY MOUTH THREE TIMES DAILY    HYDROCODONE-ACETAMINOPHEN (NORCO) 7.5-325 MG PER TABLET    Take 1 tablet by mouth every 6 hours as needed for Pain for up to 30 days. To be filled 7/24/19    IPRATROPIUM-ALBUTEROL (DUONEB) 0.5-2.5 (3) MG/3ML SOLN NEBULIZER SOLUTION    INHALE THE CONTENTS OF 1 VIAL (3ML) BY MOUTH AND INTO THE LUNGS ONCE EVERY 6 HOURS AS NEEDED FOR FOR SHORTNESS OF BREATH    LACTOBACILLUS (HM ACIDOPHILUS PROBIOTIC) TABS    TAKE 1 TABLET BY MOUTH TWICE DAILY    LACTOBACILLUS ACID-PECTIN (ACIDOPHILUS/CITRUS PECTIN) TABS    TAKE 1 TABLET BY MOUTH TWICE DAILY    MAGNESIUM 300 MG CAPS    Take 300 mg by mouth daily    METOPROLOL SUCCINATE (TOPROL XL) 100 MG EXTENDED RELEASE TABLET    Take 100 mg by mouth daily    MOMETASONE (ELOCON) 0.1 % CREAM    APPLY TOPICALLY TO THE AFFECTED AREA(S) DAILY    NEBULIZERS (COMPRESSOR/NEBULIZER) MISC    Use with albuterol solution every 6 hours as needed.     NIFEDIPINE (PROCARDIA XL) 60 MG EXTENDED RELEASE TABLET    TAKE 1 TABLET BY MOUTH TWICE DAILY    OMEPRAZOLE (PRILOSEC) 40 MG DELAYED RELEASE CAPSULE    Take 1 capsule by mouth daily    PSYLLIUM (METAMUCIL) 58.6 % PACKET    Take 1 packet by mouth daily    PSYLLIUM-CALCIUM (METAMUCIL PLUS CALCIUM) CAPS    Take 1 packet by mouth 2 times daily Take with 8 oz water. RESTASIS 0.05 % OPHTHALMIC EMULSION    INSTILL 1 DROP INTO BOTH EYES TWICE DAILY    ROPINIROLE (REQUIP) 3 MG TABLET    TAKE 1 TABLET BY MOUTH EVERY NIGHT    SIMVASTATIN (ZOCOR) 20 MG TABLET    Take 20 mg by mouth nightly    VENTOLIN  (90 BASE) MCG/ACT INHALER    INHALE 2 PUFFS INTO THE LUNGS EVERY 6 HOURS AS NEEDED FOR WHEEZING     ALLERGIES     has No Known Allergies. FAMILY HISTORY     She indicated that the status of her mother is unknown. She indicated that the status of her father is unknown. She indicated that the status of her sister is unknown. She indicated that the status of her brother is unknown. She indicated that the status of her maternal grandmother is unknown. SOCIAL HISTORY       Social History     Tobacco Use    Smoking status: Former     Packs/day: 0.50     Years: 40.00     Pack years: 20.00     Types: Cigarettes     Quit date: 2021     Years since quittin.4    Smokeless tobacco: Former   Vaping Use    Vaping Use: Never used   Substance Use Topics    Alcohol use: Yes     Alcohol/week: 0.0 standard drinks     Comment: occasional beer (once every 6 months)    Drug use: No     PHYSICAL EXAM     INITIAL VITALS: /77   Pulse 95   Temp 98.3 °F (36.8 °C)   Resp 18   Ht 5' 1\" (1.549 m)   Wt 160 lb (72.6 kg)   SpO2 94%   BMI 30.23 kg/m²    Physical Exam  Constitutional:       Appearance: Normal appearance. HENT:      Head: Normocephalic. Right Ear: External ear normal.      Left Ear: External ear normal.      Nose: Nose normal.   Eyes:      Conjunctiva/sclera: Conjunctivae normal.   Cardiovascular:      Rate and Rhythm: Regular rhythm. Abdominal:      General: There is no distension. Skin:     General: Skin is dry. Findings: Lesion (V-shaped skin tear left anterior lower leg. Bleeding well controlled.) present. Neurological:      Mental Status: She is alert. Mental status is at baseline. MEDICAL DECISION MAKING:   The patient is hemodynamically stable, afebrile, nontoxic-appearing. Physical exam notable for V-shaped skin tear anterior left lower leg. Based on history and exam given too friable to repair with sutures. Will clean with Betadine, cover with Mepilex bandage and start her on antibiotics. CRITICAL CARE:     The 30 ml/kg fluid bolus is not ordered due to concern for fluid overload and/or heart failure. PROCEDURES:    Procedures    DIAGNOSTIC RESULTS   EKG:All EKG's are interpreted by the Emergency Department Physician who either signs or Co-signs this chart in the absence of a cardiologist.        RADIOLOGY:All plain film, CT, MRI, and formal ultrasound images (except ED bedside ultrasound) are read by the radiologist, see reports below, unless otherwisenoted in MDM or here. No orders to display     LABS: All lab results were reviewed by myself, and all abnormals are listed below. Labs Reviewed - No data to display    EMERGENCY DEPARTMENTCOURSE:   Patient did well in the ED. Area vigorously cleaned with Betadine by nurse Jared Connors. Covered with Mepilex bandage. Given first dose of Keflex in the ED. Given Rx for Keflex for home. No further work-up indicated at this time. Nursing notes reviewed. At this time this is what I find, the patient appears well and does not appear sick or toxic. I gave my usual and customary discussion of the risks and benefits of discharge versus admission. I answered the patient's questions. I gave the patient strict return precautions. Patient expressed understanding of the discharge instructions.           Vitals:    Vitals:    10/26/22 2255   BP: 128/77   Pulse: 95   Resp: 18   Temp: 98.3 °F (36.8 °C)   SpO2: 94%   Weight: 160 lb (72.6 kg)   Height: 5' 1\" (1.549 m)       The patient was given the following medications while in the emergency department:  Orders Placed This Encounter   Medications    cephALEXin (KEFLEX) capsule 500 mg     Order Specific Question:   Antimicrobial Indications     Answer:   Skin and Soft Tissue Infection    cephALEXin (KEFLEX) 500 MG capsule     Sig: Take 1 capsule by mouth 4 times daily for 7 days     Dispense:  28 capsule     Refill:  0     CONSULTS:  None    FINAL IMPRESSION      1.  Skin tear of left lower leg without complication, initial encounter          DISPOSITION/PLAN   DISPOSITION Decision To Discharge 10/27/2022 12:45:43 AM      PATIENT REFERRED TO:  Carlos Murdock MD  [address]  [phone]    In 2 days    DISCHARGE MEDICATIONS:  New Prescriptions    CEPHALEXIN (KEFLEX) 500 MG CAPSULE    Take 1 capsule by mouth 4 times daily for 7 days     Daniela Hutchison MD  Attending Emergency Physician                    Marina Mejia MD  10/27/22 [id number]

## 2022-10-27 NOTE — ED NOTES
Pt arrived to ed with c/o wound to lower left leg that happened tonight when pt had a fall. Pt denies any other injury. Skin tear noted to lower left leg. Small amount of bleeding noted to wound. Pt A&Ox4.       Melissa Howard RN  10/27/22 [id number]

## 2023-02-15 ENCOUNTER — HOSPITAL ENCOUNTER (OUTPATIENT)
Age: 68
Setting detail: SPECIMEN
Discharge: HOME | End: 2023-02-15

## 2023-02-15 LAB
ABSOLUTE EOS #: 0.6 K/UL (ref 0–0.44)
ABSOLUTE IMMATURE GRANULOCYTE: 0.04 K/UL (ref 0–0.3)
ABSOLUTE LYMPH #: 1.87 K/UL (ref 1.1–3.7)
ABSOLUTE MONO #: 0.58 K/UL (ref 0.1–1.2)
ALBUMIN SERPL-MCNC: 4.2 G/DL (ref 3.5–5.2)
ALBUMIN/GLOBULIN RATIO: 1.4 (ref 1–2.5)
ALP SERPL-CCNC: 79 U/L (ref 35–104)
ALT SERPL-CCNC: 13 U/L (ref 5–33)
ANION GAP SERPL CALCULATED.3IONS-SCNC: 12 MMOL/L (ref 9–17)
AST SERPL-CCNC: 16 U/L
BASOPHILS # BLD: 2 % (ref 0–2)
BASOPHILS ABSOLUTE: 0.12 K/UL (ref 0–0.2)
BILIRUB SERPL-MCNC: <0.1 MG/DL (ref 0.3–1.2)
BUN SERPL-MCNC: 12 MG/DL (ref 8–23)
CALCIUM SERPL-MCNC: 9 MG/DL (ref 8.6–10.4)
CHLORIDE SERPL-SCNC: 99 MMOL/L (ref 98–107)
CHOLEST SERPL-MCNC: 218 MG/DL
CHOLESTEROL/HDL RATIO: 3.3
CK SERPL-CCNC: 81 U/L (ref 26–192)
CO2 SERPL-SCNC: 26 MMOL/L (ref 20–31)
CREAT SERPL-MCNC: 0.89 MG/DL (ref 0.5–0.9)
EOSINOPHILS RELATIVE PERCENT: 8 % (ref 1–4)
EST. AVERAGE GLUCOSE BLD GHB EST-MCNC: 111 MG/DL
GFR SERPL CREATININE-BSD FRML MDRD: >60 ML/MIN/1.73M2
GLUCOSE SERPL-MCNC: 99 MG/DL (ref 70–99)
HBA1C MFR BLD: 5.5 % (ref 4–6)
HCT VFR BLD AUTO: 41.2 % (ref 36.3–47.1)
HDLC SERPL-MCNC: 66 MG/DL
HGB BLD-MCNC: 13.3 G/DL (ref 11.9–15.1)
IMMATURE GRANULOCYTES: 1 %
INSULIN COMMENT: 845
INSULIN REFERENCE RANGE:: NORMAL
INSULIN: 12.1 MU/L
LDLC SERPL CALC-MCNC: 117 MG/DL (ref 0–130)
LYMPHOCYTES # BLD: 26 % (ref 24–43)
MCH RBC QN AUTO: 29.4 PG (ref 25.2–33.5)
MCHC RBC AUTO-ENTMCNC: 32.3 G/DL (ref 28.4–34.8)
MCV RBC AUTO: 91.2 FL (ref 82.6–102.9)
MONOCYTES # BLD: 8 % (ref 3–12)
NRBC AUTOMATED: 0 PER 100 WBC
PDW BLD-RTO: 12.6 % (ref 11.8–14.4)
PLATELET # BLD AUTO: 335 K/UL (ref 138–453)
PMV BLD AUTO: 9.6 FL (ref 8.1–13.5)
POTASSIUM SERPL-SCNC: 4.2 MMOL/L (ref 3.7–5.3)
PROT SERPL-MCNC: 7.2 G/DL (ref 6.4–8.3)
RBC # BLD: 4.52 M/UL (ref 3.95–5.11)
SEG NEUTROPHILS: 55 % (ref 36–65)
SEGMENTED NEUTROPHILS ABSOLUTE COUNT: 4.09 K/UL (ref 1.5–8.1)
SODIUM SERPL-SCNC: 137 MMOL/L (ref 135–144)
TRIGL SERPL-MCNC: 176 MG/DL
TSH SERPL-ACNC: 1.82 UIU/ML (ref 0.3–5)
WBC # BLD AUTO: 7.3 K/UL (ref 3.5–11.3)

## 2023-03-13 ENCOUNTER — APPOINTMENT (OUTPATIENT)
Dept: CT IMAGING | Age: 68
End: 2023-03-13
Payer: COMMERCIAL

## 2023-03-13 ENCOUNTER — HOSPITAL ENCOUNTER (EMERGENCY)
Age: 68
Discharge: HOME | End: 2023-03-13
Attending: EMERGENCY MEDICINE
Payer: COMMERCIAL

## 2023-03-13 ENCOUNTER — APPOINTMENT (OUTPATIENT)
Dept: GENERAL RADIOLOGY | Age: 68
End: 2023-03-13
Payer: COMMERCIAL

## 2023-03-13 VITALS
HEART RATE: 71 BPM | OXYGEN SATURATION: 96 % | BODY MASS INDEX: 28.34 KG/M2 | RESPIRATION RATE: 16 BRPM | TEMPERATURE: 98.1 F | WEIGHT: 150 LBS | SYSTOLIC BLOOD PRESSURE: 130 MMHG | DIASTOLIC BLOOD PRESSURE: 84 MMHG

## 2023-03-13 DIAGNOSIS — S60.221A CONTUSION OF RIGHT HAND, INITIAL ENCOUNTER: ICD-10-CM

## 2023-03-13 DIAGNOSIS — T14.8XXA ABRASION: ICD-10-CM

## 2023-03-13 DIAGNOSIS — M25.561 ACUTE PAIN OF RIGHT KNEE: ICD-10-CM

## 2023-03-13 DIAGNOSIS — S02.2XXA CLOSED FRACTURE OF NASAL BONE, INITIAL ENCOUNTER: Primary | ICD-10-CM

## 2023-03-13 DIAGNOSIS — M25.551 RIGHT HIP PAIN: ICD-10-CM

## 2023-03-13 PROCEDURE — 71046 X-RAY EXAM CHEST 2 VIEWS: CPT

## 2023-03-13 PROCEDURE — 99284 EMERGENCY DEPT VISIT MOD MDM: CPT

## 2023-03-13 PROCEDURE — 70486 CT MAXILLOFACIAL W/O DYE: CPT

## 2023-03-13 PROCEDURE — 70450 CT HEAD/BRAIN W/O DYE: CPT

## 2023-03-13 PROCEDURE — 6370000000 HC RX 637 (ALT 250 FOR IP): Performed by: PHYSICIAN ASSISTANT

## 2023-03-13 RX ORDER — HYDROCODONE BITARTRATE AND ACETAMINOPHEN 5; 325 MG/1; MG/1
1 TABLET ORAL EVERY 6 HOURS PRN
Qty: 12 TABLET | Refills: 0 | Status: SHIPPED | OUTPATIENT
Start: 2023-03-13 | End: 2023-03-16

## 2023-03-13 RX ORDER — ACETAMINOPHEN 500 MG
1000 TABLET ORAL ONCE
Status: COMPLETED | OUTPATIENT
Start: 2023-03-13 | End: 2023-03-13

## 2023-03-13 RX ADMIN — ACETAMINOPHEN 1000 MG: 500 TABLET ORAL at 15:25

## 2023-03-13 ASSESSMENT — PAIN DESCRIPTION - ORIENTATION: ORIENTATION: RIGHT

## 2023-03-13 ASSESSMENT — PAIN DESCRIPTION - FREQUENCY: FREQUENCY: CONTINUOUS

## 2023-03-13 ASSESSMENT — PAIN SCALES - GENERAL: PAINLEVEL_OUTOF10: 10

## 2023-03-13 ASSESSMENT — PAIN DESCRIPTION - DESCRIPTORS: DESCRIPTORS: BURNING;SHARP

## 2023-03-13 ASSESSMENT — PAIN - FUNCTIONAL ASSESSMENT: PAIN_FUNCTIONAL_ASSESSMENT: 0-10

## 2023-03-13 NOTE — ED PROVIDER NOTES
[facility] ED  eMERGENCY dEPARTMENT eNCOUnter      Pt Name: Shilpa Hernández  MRN: [id number]  [place] 1955  Date of evaluation: 3/13/2023  Provider: Jannet King PA-C    CHIEF COMPLAINT       Chief Complaint   Patient presents with    Head Injury     Jana Moore last night on concrete driveway, denies LOC    Breast Pain     right    Hip Pain     right    Hand Injury     right     HISTORY OF PRESENT ILLNESS  (Location/Symptom, Timing/Onset, Context/Setting, Quality, Duration, Modifying Factors, Severity.)   Shilpa Hernández is a 80 y.o. female who presents to the emergency department. She states that she was going to get in the car yesterday when she tripped over a piece of concrete and fell forward landing on her right knee and right side of her face. Patient denies any loss of consciousness. She states that she felt a \"ripping sensation\" in her right breast.  She has some discomfort in her right hand right hip and right knee however she states that they are just bruised and she has no sensation of any bony tenderness at this time. She mainly came in due to the sensation and bruising present in her right breast.  Patient is not on a blood thinner. She denies any other injuries. She did not see anyone yesterday. She has not taken anything for discomfort or pain. Movement makes it worse and holding still makes it better. Patient is UTD on her tetanus. Nursing Notes were reviewed. ALLERGIES     Patient has no known allergies. CURRENT MEDICATIONS       Previous Medications    ALPRAZOLAM (XANAX) 1 MG TABLET    Take by mouth as needed .     ASPIRIN 81 MG EC TABLET    Take 81 mg by mouth daily    BEVESPI AEROSPHERE 9-4.8 MCG/ACT AERO    INHALE 2 PUFFS BY MOUTH INTO THE LUNGS TWICE DAILY    BUMETANIDE (BUMEX) 0.5 MG TABLET    Take 0.5 mg by mouth daily as needed    BUPROPION (WELLBUTRIN XL) 150 MG EXTENDED RELEASE TABLET    Take 300 mg by mouth daily Indications: 150 mg x2 daily Taking 400 mg daily (2 of 200s)    CETIRIZINE (ZYRTEC) 10 MG TABLET    TAKE 1 TABLET BY MOUTH DAILY    CHOLECALCIFEROL (VITAMIN D3) 1000 UNITS TABS    TAKE 1 TABLET BY MOUTH DAILY    CLOPIDOGREL (PLAVIX) 75 MG TABLET    Take 75 mg by mouth daily    COENZYME Q10 (COQ-10) 400 MG CAPS    Take 400 mg by mouth daily    DULOXETINE (CYMBALTA) 60 MG EXTENDED RELEASE CAPSULE    Take 1 capsule by mouth daily    FAMOTIDINE (PEPCID) 40 MG TABLET    Take 40 mg by mouth nightly     FERROUS SULFATE (IRON 325) 325 (65 FE) MG TABLET    Take 325 mg by mouth daily (with breakfast)    FLOVENT  MCG/ACT INHALER    INHALE 2 PUFFS INTO THE LUNGS TWICE DAILY    GABAPENTIN (NEURONTIN) 400 MG CAPSULE    TAKE 1 CAPSULE BY MOUTH THREE TIMES DAILY    IPRATROPIUM-ALBUTEROL (DUONEB) 0.5-2.5 (3) MG/3ML SOLN NEBULIZER SOLUTION    INHALE THE CONTENTS OF 1 VIAL (3ML) BY MOUTH AND INTO THE LUNGS ONCE EVERY 6 HOURS AS NEEDED FOR FOR SHORTNESS OF BREATH    LACTOBACILLUS (HM ACIDOPHILUS PROBIOTIC) TABS    TAKE 1 TABLET BY MOUTH TWICE DAILY    LACTOBACILLUS ACID-PECTIN (ACIDOPHILUS/CITRUS PECTIN) TABS    TAKE 1 TABLET BY MOUTH TWICE DAILY    MAGNESIUM 300 MG CAPS    Take 300 mg by mouth daily    METOPROLOL SUCCINATE (TOPROL XL) 100 MG EXTENDED RELEASE TABLET    Take 100 mg by mouth daily    MOMETASONE (ELOCON) 0.1 % CREAM    APPLY TOPICALLY TO THE AFFECTED AREA(S) DAILY    NEBULIZERS (COMPRESSOR/NEBULIZER) MISC    Use with albuterol solution every 6 hours as needed. NIFEDIPINE (PROCARDIA XL) 60 MG EXTENDED RELEASE TABLET    TAKE 1 TABLET BY MOUTH TWICE DAILY    OMEPRAZOLE (PRILOSEC) 40 MG DELAYED RELEASE CAPSULE    Take 1 capsule by mouth daily    PSYLLIUM (METAMUCIL) 58.6 % PACKET    Take 1 packet by mouth daily    PSYLLIUM-CALCIUM (METAMUCIL PLUS CALCIUM) CAPS    Take 1 packet by mouth 2 times daily Take with 8 oz water.     RESTASIS 0.05 % OPHTHALMIC EMULSION    INSTILL 1 DROP INTO BOTH EYES TWICE DAILY    ROPINIROLE (REQUIP) 3 MG TABLET    TAKE 1 TABLET BY MOUTH EVERY NIGHT    SIMVASTATIN (ZOCOR) 20 MG TABLET    Take 20 mg by mouth nightly    VENTOLIN  (90 BASE) MCG/ACT INHALER    INHALE 2 PUFFS INTO THE LUNGS EVERY 6 HOURS AS NEEDED FOR WHEEZING     PAST MEDICAL HISTORY         Diagnosis Date    Anxiety     CAD (coronary artery disease)     Carotid artery stenosis     Cataract     CHF (congestive heart failure) ([facility])     Chronic back pain     on 5/30/19 pt states is presently in pain mgmnt    Colon polyp 02/26/2020    tubular adenoma    COPD (chronic obstructive pulmonary disease) ([facility])     Depression     Esophageal reflux     Fibromyalgia     Head injury     Multiple head injuries from 2786-8690. Headache(784.0)     Hearing loss     Pt wears hearing aids    Heart attack ([address] Unknown    2005    Hyperlipidemia     Hypertension     Insomnia     Iron deficiency anemia     MARTHA (obstructive sleep apnea)     Osteoarthritis     Pneumonia     \"years ago\" per patient (written 10/14/2020)    Restless leg syndrome     Short-term memory loss     Shortness of breath     cannot climb 2 flights of stairs or walk 2 city blocks without becoming SOB    TIA (transient ischemic attack) 2005    Tinnitus     Ulcerative colitis ([address]     Unspecified cerebral artery occlusion with cerebral infarction 2005    TIA per patient     SURGICAL HISTORY           Procedure Laterality Date    ANESTHESIA NERVE BLOCK Left 11/13/2017    lumbar epidural steroid injection  performed by Annette Halsted, MD at [facility] Left 1/29/2018    NERVE BLOCK LEFT CERVICAL MEDIAL BRANCH C3 TON C4 C5 performed by Annette Halsted, MD at [facility] Bilateral 4/15/2019    BILATERAL LUMBAR MEDIAL [address] L2-L5 performed by Annette Halsted, MD at [facility] Bilateral 4/22/2019    BILATERAL LUMBAR MEDIAL BRANCH NERVE BLOCK L2-L5 performed by Annette Halsted, MD at [address] [place] [address]  2015    No stents placed.      CAROTID ENDARTERECTOMY Right 10/27/2020    RIGHT CAROTID ENDARTERECTOMY performed by Yunier Griggs MD at [facility]  5/2012    severe spasms in the sigmoid colon , diverticulosis    COLONOSCOPY  02/26/2020    tubular adenoma    COLONOSCOPY  2/26/2020    COLONOSCOPY POLYPECTOMY COLD BIOPSY performed by Isidro Saldivar MD at [address] [place] [address] bone reconstruction     EPIDURAL STEROID INJECTION Left 9/25/2017    EPIDURAL STEROID INJECTION LEFT L4 L5  performed by Jose C Dean MD at [facility]  06/21/2014    lumbar CARLEY     LUMBAR SPINE SURGERY  05/05/2014    lumbar CARLEY     NECK SURGERY      cadaverbone placement    NECK SURGERY  06/07/2014    cervical CARLEY     NERVE BLOCK Left 4/7/14    CERVIAL NECK    NERVE BLOCK N/A 2/29/2016    LUMBAR CARLEY    NERVE BLOCK N/A 11/07/2016    LUMBAR CARLEY    NERVE BLOCK Left 11/13/2017    lumbar CARLEY    NERVE BLOCK Left 01/29/2018    C3-5    NERVE BLOCK Right 02/12/2018    cervical medial brach    NERVE BLOCK Right 10/15/2018    Lumbar CARLEY    OTHER SURGICAL HISTORY  12/15/2014    lumbar steroid injection    OTHER SURGICAL HISTORY  7-20-15    left cervical steroid injection    OTHER SURGICAL HISTORY  01-25-16    Bilateral sacroiliac joint injection     OTHER SURGICAL HISTORY  09/25/2017    Lumbar Epidural Steroid injection    OTHER SURGICAL HISTORY Left 08/20/2018    cervical facet injection C2-3 and 4-5    OTHER SURGICAL HISTORY  12/03/2018    NERVE RADIOFREQUENCY ABLATION LEFT CERVICAL MEDIAL BRANCH C3/TON/C4/C5 (Left )    SC INJECT NERV BLCK,CERV PLEXUS Left 2/12/2018    NERVE BLOCK LEFT CERVICAL MEDIAL BRANCH C3/TON/C4/C5 performed by Jose C Dean MD at [address] DX/THER AGT PVRT FACET JT CRV/THRC 1 LEVEL Left 8/20/2018    LEFT CERVICAL FACET STEROID INJECTION C2/34 C3/4 C4/5 performed by Jose C Dean MD at [address] DX/THER AGT PVRT FACET JT LMBR/SAC 1 LEVEL Bilateral 10/15/2018    NANCY LUMBAR FACET STEROID INJECTION performed by Vidal Chang MD at [facility] OFFICE/OUTPT [address] Left 12/3/2018    NERVE RADIOFREQUENCY ABLATION LEFT CERVICAL MEDIAL BRANCH C3/TON/C4/C5 performed by Vidal Chang MD at [address] Left 3/4/2019    NERVE RADIOFREQUENCY ABLATION LEFT CERVICAL   MEDIAL BRANCH  C3/TON/C4/C5 performed by Vidal Chang MD at [address] Right 6/24/2019    RIGHT LUMBAR MEDIAL [address] [facility]  L2-L5 performed by Vidal Chang MD at [address] ENDOSCOPY  5/2012    normal    UPPER GASTROINTESTINAL ENDOSCOPY  7 17 15    biopsy, pathology-mild chronic inflammation    UPPER GASTROINTESTINAL ENDOSCOPY  02/26/2020    with biopsies     UPPER GASTROINTESTINAL ENDOSCOPY  2/26/2020    EGD BIOPSY performed by Danii Welsh MD at [place] [address] HISTORY           Problem Relation Age of Onset    Diabetes Mother     Alzheimer's Disease Mother     High Blood Pressure Father     Diabetes Sister     High Blood Pressure Sister     High Blood Pressure Brother     Diabetes Maternal Grandmother      Family Status   Relation Name Status    Mother  (Not Specified)    Father  (Not Specified)    Sister  (Not Specified)    Brother  (Not Specified)    MGM  (Not Specified)      SOCIAL HISTORY      reports that she quit smoking about 21 months ago. Her smoking use included cigarettes. She has a 20.00 pack-year smoking history. She has quit using smokeless tobacco. She reports current alcohol use. She reports that she does not use drugs. REVIEW OF SYSTEMS    (2-9 systems for level 4, 10 or more for level 5)     Constitutional: Denies recent fever, chills, weakness. Visual: Denies recent change in vision, discharge or pain. ENT: Denies recent sore throat, nasal congestion, ear pain.   Respiratory: Denies recent shortness of breath,  cough , wheezing or pleuritic chest pain.  Cardiac:  Denies recent chest pain palpitations dyspnea on exertion or swelling in the lower extremities. GI: denies any recent abdominal pain nausea vomiting or diarrhea. : denies any recent difficulty urinating or pain in the genitals. Musculoskeletal :  + right hand, hip and knee pain  Neurologic: denies any seizure activity headaches stroke like symptoms or syncope. Skin:  Denies any recent new rash itching or change in skin color. Psychiatric:  Denies any thoughts of suicide homicide. Patient does not voice any problems with anxiety or depression     Except as noted above the remainder of the review of systems was reviewed and negative. PHYSICAL EXAM    (up to 7 for level 4, 8 or more for level 5)     ED Triage Vitals [03/13/23 1416]   BP Temp Temp Source Heart Rate Resp SpO2 Height Weight   130/84 98.1 °F (36.7 °C) Oral 71 16 96 % -- 150 lb (68 kg)       Physical Exam  Vitals and nursing note reviewed. Constitutional:       Appearance: She is normal weight. HENT:      Head: Normocephalic. Nose: Nose normal. No congestion or rhinorrhea. Mouth/Throat:      Mouth: Mucous membranes are moist.      Pharynx: Oropharynx is clear. Eyes:      Extraocular Movements: Extraocular movements intact. Conjunctiva/sclera: Conjunctivae normal.      Pupils: Pupils are equal, round, and reactive to light. Cardiovascular:      Rate and Rhythm: Normal rate and regular rhythm. Pulses: Normal pulses. Heart sounds: Normal heart sounds. Pulmonary:      Effort: Pulmonary effort is normal.      Breath sounds: Normal breath sounds. Abdominal:      General: Abdomen is flat. Bowel sounds are normal.      Palpations: Abdomen is soft. Musculoskeletal:         General: Tenderness and signs of injury present. No swelling or deformity. Cervical back: Normal range of motion and neck supple. Right knee: Bony tenderness present.  No swelling, deformity, effusion, erythema, ecchymosis, lacerations or crepitus. Decreased range of motion. Tenderness present. Normal alignment, normal meniscus and normal patellar mobility.      Right lower leg: No edema.      Left lower leg: No edema.        Legs:    Skin:     General: Skin is warm.   Neurological:      General: No focal deficit present.      Mental Status: She is alert and oriented to person, place, and time. Mental status is at baseline.   Psychiatric:         Mood and Affect: Mood normal.         Behavior: Behavior normal.         Thought Content: Thought content normal.         Judgment: Judgment normal.       DIAGNOSTIC RESULTS     EKG: Not clinically indicated    RADIOLOGY:   Non-plain film images such as CT, Ultrasound and MRI are read by the radiologist. Plain radiographic images are visualized and preliminarily interpreted by the emergency physician with the below findings:    Interpretation per the Radiologist below, if available at the time of this note:    XR CHEST (2 VW)    Result Date: 3/13/2023  EXAMINATION: TWO XRAY VIEWS OF THE CHEST 3/13/2023 2:53 pm COMPARISON: June 24, 2021 CT chest; November 9, 2020 HISTORY: ORDERING SYSTEM PROVIDED HISTORY: SOB TECHNOLOGIST PROVIDED HISTORY: SOB Reason for Exam: Pt eval for chest pain/sob FINDINGS: Stable cardiomediastinal silhouette.  No significant pulmonary edema. Symmetric hyperexpansion of the lungs consistent with underlying COPD/emphysema.  No focal lung consolidation or infiltrate.  No pleural effusion or pneumothorax. Osseous structures are unchanged.     COPD suspected.  Otherwise, no acute cardiopulmonary process     CT Head W/O Contrast    Result Date: 3/13/2023  EXAMINATION: CT OF THE HEAD WITHOUT CONTRAST  3/13/2023 2:56 pm TECHNIQUE: CT of the head was performed without the administration of intravenous contrast. Automated exposure control, iterative reconstruction, and/or weight based adjustment of the mA/kV was utilized to reduce the radiation dose to as low as  reasonably achievable. COMPARISON: June 28, 2011 HISTORY: ORDERING SYSTEM PROVIDED HISTORY: fall TECHNOLOGIST PROVIDED HISTORY: fall Decision Support Exception - unselect if not a suspected or confirmed emergency medical condition->Emergency Medical Condition (MA) Reason for Exam: Ab Ann last night on concrete driveway, denies LOC FINDINGS: BRAIN/VENTRICLES: There is no acute intracranial hemorrhage, mass effect or midline shift. No abnormal extra-axial fluid collection. The gray-white differentiation is maintained without evidence of an acute infarct. There is no evidence of hydrocephalus. Incidental calcified 1 cm meningioma noted in the region of the right temporal lobe. ORBITS: The visualized portion of the orbits demonstrate no acute abnormality. SINUSES: The visualized paranasal sinuses and mastoid air cells demonstrate no acute abnormality. SOFT TISSUES/SKULL:  No acute abnormality of the visualized skull or soft tissues. No acute intracranial abnormality. Incidental 1 cm right temporal lobe calcified meningioma. CT FACIAL BONES WO CONTRAST    Result Date: 3/13/2023  EXAMINATION: CT OF THE FACE WITHOUT CONTRAST  3/13/2023 2:56 pm TECHNIQUE: CT of the face was performed without the administration of intravenous contrast. Multiplanar reformatted images are provided for review. Automated exposure control, iterative reconstruction, and/or weight based adjustment of the mA/kV was utilized to reduce the radiation dose to as low as reasonably achievable. COMPARISON: None HISTORY: ORDERING SYSTEM PROVIDED HISTORY: Trauma TECHNOLOGIST PROVIDED HISTORY: Trauma Reason for Exam: Ab Ann last night on concrete driveway, denies LOC FINDINGS: FACIAL BONES: Osteopenia. Nasal bone tip fracture fragments and discontinuity, but with only mild overlying soft tissue swelling; mild irregularity maxillary spine with linear lucency.   Otherwise, the frontal sinuses, orbital walls, maxilla, pterygoid plates, zygomatic arches, hard palate, and mandible are intact. Previous cause wire left anterior maxillary wall. The temporomandibular joints are aligned with bilateral degenerative changes, severe on the right (narrowing and sclerosis). Patient is nearly edentulous with only mandibular frontal teeth and some periodontal disease noted. ORBITAL CONTENTS: Globes appear intact. S/p cataract surgery. The extraocular muscles, optic nerve sheath complexes and lacrimal glands appear unremarkable. No retrobulbar hematoma or mass. SINUSES: No evidence of acute sinusitis, such as air fluid level. Scattered mucosal thickening noted left frontal, bilateral ethmoid sphenoid and bilateral maxillary sinuses; probable retention cyst or polyp left lateral maxillary antrum. Visualized mastoid air cells clear. SOFT TISSUES: No marked superficial facial soft tissue swelling is seen. Degenerative changes visualized cervical spine. Nasal tip and possible maxillary spine fractures, of indeterminate age. Correlate clinically. No other acute facial bone trauma suspected. Previous anterior left maxillary fracture status post cerclage wire placement. No acute sinus abnormality; pansinus disease, with probable polyp or retention cyst left maxillary antrum. Osteopenia. Bilateral TMJ degenerative changes, worse on the right. Additional findings, as above. LABS: Not clinically indicated    EMERGENCY DEPARTMENT COURSE and DIFFERENTIAL DIAGNOSIS/MDM:   Vitals:    Vitals:    03/13/23 1416   BP: 130/84   Pulse: 71   Resp: 16   Temp: 98.1 °F (36.7 °C)   TempSrc: Oral   SpO2: 96%   Weight: 150 lb (68 kg)       MEDICATIONS GIVEN IN THE ED:  Medications   acetaminophen (TYLENOL) tablet 1,000 mg (1,000 mg Oral Given 3/13/23 1525)       CLINICAL DECISION MAKING:  The patient presented alert with a nontoxic appearance and was seen in conjunction with Dr. [address] DDx include Nasal bone fracture, contusion, abrasion. I will order CT head and face.      Test considered, but not ordered: NONE    The patient was involved in his/her plan of care through shared decision making. The testing that was ordered was discussed with the patient. Any medications that may have been ordered were discussed with the patient.     I have reviewed the patient's previous medical records using the electronic health record that we have available that were pertinent to today's visit.      Imaging was reviewed and reported by the radiologist. Results showed Nasal tip and possible maxillary spine fractures, of indeterminate age. Correlate clinically.  No other acute facial bone trauma suspected. Previous anterior left maxillary fracture status post cerclage wire placement.  No acute sinus abnormality; pansinus disease, with probable polyp or retention cyst left maxillary antrum. Osteopenia.  Bilateral TMJ degenerative changes, worse on the right. Additional findings, as above.      Evaluation and treatment course in the ED, and plan of care upon discharge was discussed in length with the patient. Patient had no further questions prior to being discharged and was instructed to return to the ED for new or worsening symptoms.      Care was provided during an unprecedented national emergency due to the novel coronavirus, Covid-19.       MIPS  none    CONSULTS:  None    PROCEDURES:  none    FINAL IMPRESSION      1. Closed fracture of nasal bone, initial encounter    2. Contusion of right hand, initial encounter    3. Right hip pain    4. Acute pain of right knee    5. Abrasion          Problem List  Patient Active Problem List   Diagnosis Code    Chronic obstructive pulmonary disease (HCC) J44.9    Essential hypertension I10    GERD (gastroesophageal reflux disease) K21.9    CAD (coronary artery disease) I25.10    Depression F32.A    Anxiety F41.9    Chronic use of opiate drugs therapeutic purposes Z79.891    MI, old I25.2    Diverticulosis of colon K57.30    Cervical spondylosis M47.812    RLS  (restless legs syndrome) G25.81    Hx of fusion of cervical spine Z98.1    Cervical spondylosis with radiculopathy M47.22    Fibrocystic breast changes of both breasts I75.02, R47.71    Umbilical hernia without obstruction and without gangrene K42.9    Lumbosacral spondylosis without myelopathy M47.817    Mixed hyperlipidemia E78.2    Chronic bilateral low back pain without sciatica M54.50, G89.29    Hormone imbalance E34.9    Epigastric pain R10.13    Iron deficiency anemia D50.9    Colon polyp K63.5    Other irritable bowel syndrome K58.8    Smoker F17.200    Carotid stenosis, bilateral I65.23    Carotid stenosis, right I65.21    Slow transit constipation K59.01       DISPOSITION/PLAN   DISPOSITION Decision To Discharge 03/13/2023 05:05:48 PM      PATIENT REFERRED TO:   Sandra Romero MD  [address]  [phone]    Schedule an appointment as soon as possible for a visit       DISCHARGE MEDICATIONS:     New Prescriptions    HYDROCODONE-ACETAMINOPHEN (NORCO) 5-325 MG PER TABLET    Take 1 tablet by mouth every 6 hours as needed for Pain for up to 3 days. Intended supply: 3 days.  Take lowest dose possible to manage pain Max Daily Amount: 4 tablets           (Please note that portions of this note were completed with a voice recognition program.  Efforts were made to edit the dictations but occasionally words are mis-transcribed.)    YOSELYN Corral PA-C  03/13/23 1984

## 2023-03-13 NOTE — ED PROVIDER NOTES
eMERGENCY dEPARTMENT eNCOUnter   Independent Attestation     Pt Name: Diya Haynes  MRN: [id number]  Birthdate 1955  Date of evaluation: 3/13/23     Diya Haynes is a 68 y.o. female with CC: Head Injury (Fell last night on concrete driveway, denies LOC), Breast Pain (right), Hip Pain (right), and Hand Injury (right)      This visit was performed by both a physician and an APC. I performed all aspects of the MDM as documented.    Annette Warner DO  Attending Emergency Physician                  Annette Warner DO  03/13/23 6413

## 2023-04-14 ENCOUNTER — HOSPITAL ENCOUNTER (OUTPATIENT)
Dept: GENERAL RADIOLOGY | Age: 68
Discharge: HOME | End: 2023-04-16
Payer: MEDICARE

## 2023-04-14 ENCOUNTER — HOSPITAL ENCOUNTER (OUTPATIENT)
Age: 68
Discharge: HOME | End: 2023-04-16
Payer: MEDICARE

## 2023-04-14 DIAGNOSIS — R52 PAIN: ICD-10-CM

## 2023-04-14 PROCEDURE — 73502 X-RAY EXAM HIP UNI 2-3 VIEWS: CPT

## 2023-04-14 PROCEDURE — 73030 X-RAY EXAM OF SHOULDER: CPT

## 2023-04-26 VITALS — BODY MASS INDEX: 29.83 KG/M2 | HEIGHT: 61 IN | WEIGHT: 158 LBS

## 2023-05-03 ENCOUNTER — HOSPITAL ENCOUNTER (OUTPATIENT)
Dept: CT IMAGING | Age: 68
Discharge: HOME | End: 2023-05-05
Payer: COMMERCIAL

## 2023-05-03 VITALS — BODY MASS INDEX: 30.21 KG/M2 | WEIGHT: 160 LBS | HEIGHT: 61 IN

## 2023-05-03 DIAGNOSIS — F17.200 SMOKER: ICD-10-CM

## 2023-05-03 DIAGNOSIS — F17.210 CIGARETTE SMOKER: ICD-10-CM

## 2023-05-03 DIAGNOSIS — Z87.891 PERSONAL HISTORY OF TOBACCO USE, PRESENTING HAZARDS TO HEALTH: ICD-10-CM

## 2023-05-03 PROCEDURE — 71271 CT THORAX LUNG CANCER SCR C-: CPT

## 2023-05-11 ENCOUNTER — HOSPITAL ENCOUNTER (OUTPATIENT)
Age: 68
Setting detail: SPECIMEN
Discharge: HOME | End: 2023-05-11

## 2023-05-12 LAB
FOLATE SERPL-MCNC: 5.1 NG/ML
VIT B12 SERPL-MCNC: 643 PG/ML (ref 232–1245)

## 2023-05-18 ENCOUNTER — HOSPITAL ENCOUNTER (OUTPATIENT)
Dept: VASCULAR LAB | Age: 68
Discharge: HOME | End: 2023-05-18
Payer: COMMERCIAL

## 2023-05-18 DIAGNOSIS — Z98.890 S/P CAROTID ENDARTERECTOMY: ICD-10-CM

## 2023-05-18 DIAGNOSIS — I65.22 LEFT CAROTID ARTERY STENOSIS: ICD-10-CM

## 2023-05-18 PROCEDURE — 93880 EXTRACRANIAL BILAT STUDY: CPT

## 2023-08-22 ENCOUNTER — HOSPITAL ENCOUNTER (OUTPATIENT)
Dept: GENERAL RADIOLOGY | Age: 68
Discharge: HOME | End: 2023-08-24
Payer: MEDICARE

## 2023-08-22 ENCOUNTER — HOSPITAL ENCOUNTER (OUTPATIENT)
Age: 68
Discharge: HOME | End: 2023-08-22
Payer: MEDICARE

## 2023-08-22 ENCOUNTER — HOSPITAL ENCOUNTER (OUTPATIENT)
Age: 68
Discharge: HOME | End: 2023-08-24
Payer: MEDICARE

## 2023-08-22 DIAGNOSIS — R07.81 ANTERIOR PLEURITIC PAIN: ICD-10-CM

## 2023-08-22 LAB
ANION GAP SERPL CALCULATED.3IONS-SCNC: 11 MMOL/L (ref 9–17)
BNP SERPL-MCNC: 212 PG/ML
BUN SERPL-MCNC: 16 MG/DL (ref 8–23)
CALCIUM SERPL-MCNC: 9.1 MG/DL (ref 8.6–10.4)
CHLORIDE SERPL-SCNC: 98 MMOL/L (ref 98–107)
CO2 SERPL-SCNC: 25 MMOL/L (ref 20–31)
CREAT SERPL-MCNC: 1.1 MG/DL (ref 0.5–0.9)
D DIMER PPP FEU-MCNC: 0.59 UG/ML FEU (ref 0–0.59)
GFR SERPL CREATININE-BSD FRML MDRD: 55 ML/MIN/1.73M2
GLUCOSE SERPL-MCNC: 87 MG/DL (ref 70–99)
POTASSIUM SERPL-SCNC: 4.8 MMOL/L (ref 3.7–5.3)
SODIUM SERPL-SCNC: 134 MMOL/L (ref 135–144)

## 2023-08-22 PROCEDURE — 71046 X-RAY EXAM CHEST 2 VIEWS: CPT

## 2023-08-22 PROCEDURE — 85379 FIBRIN DEGRADATION QUANT: CPT

## 2023-08-22 PROCEDURE — 80048 BASIC METABOLIC PNL TOTAL CA: CPT

## 2023-08-22 PROCEDURE — 83880 ASSAY OF NATRIURETIC PEPTIDE: CPT

## 2023-08-22 PROCEDURE — 36415 COLL VENOUS BLD VENIPUNCTURE: CPT

## 2023-08-24 ENCOUNTER — APPOINTMENT (OUTPATIENT)
Dept: VASCULAR LAB | Age: 68
End: 2023-08-24
Payer: MEDICARE

## 2023-08-24 ENCOUNTER — HOSPITAL ENCOUNTER (EMERGENCY)
Age: 68
Discharge: HOME | End: 2023-08-24
Attending: EMERGENCY MEDICINE
Payer: MEDICARE

## 2023-08-24 VITALS
DIASTOLIC BLOOD PRESSURE: 74 MMHG | SYSTOLIC BLOOD PRESSURE: 134 MMHG | HEART RATE: 79 BPM | RESPIRATION RATE: 16 BRPM | WEIGHT: 170 LBS | OXYGEN SATURATION: 100 % | TEMPERATURE: 98.1 F | HEIGHT: 61 IN | BODY MASS INDEX: 32.1 KG/M2

## 2023-08-24 DIAGNOSIS — M79.89 LEG SWELLING: ICD-10-CM

## 2023-08-24 DIAGNOSIS — M79.605 PAIN IN BOTH LOWER EXTREMITIES: Primary | ICD-10-CM

## 2023-08-24 DIAGNOSIS — M79.604 PAIN IN BOTH LOWER EXTREMITIES: Primary | ICD-10-CM

## 2023-08-24 LAB — ECHO BSA: 1.82 M2

## 2023-08-24 PROCEDURE — 93970 EXTREMITY STUDY: CPT | Performed by: SURGERY

## 2023-08-24 PROCEDURE — 6370000000 HC RX 637 (ALT 250 FOR IP): Performed by: PHYSICIAN ASSISTANT

## 2023-08-24 PROCEDURE — 93970 EXTREMITY STUDY: CPT

## 2023-08-24 PROCEDURE — 99284 EMERGENCY DEPT VISIT MOD MDM: CPT

## 2023-08-24 RX ORDER — OXYCODONE HYDROCHLORIDE AND ACETAMINOPHEN 5; 325 MG/1; MG/1
1 TABLET ORAL ONCE
Status: COMPLETED | OUTPATIENT
Start: 2023-08-24 | End: 2023-08-24

## 2023-08-24 RX ADMIN — OXYCODONE AND ACETAMINOPHEN 1 TABLET: 5; 325 TABLET ORAL at 14:04

## 2023-08-24 ASSESSMENT — PAIN SCALES - GENERAL: PAINLEVEL_OUTOF10: 8

## 2023-08-24 NOTE — ED NOTES
Patient presents to ER from home due to abnormal lab. Patient states D-Dimer testing came back abnormal and was sent by PCP. Patient states she Is unsure where possible clot is located. Patient states hematoma to her breast area. Patient denies pain due to recently taking pain medication. Patient states hx of SOB. Patient states SOB increasing. Patient A/O x 4, equal chest expansion with non labored breathing, call light in reach, wheels locked, bed in lowest position.       Troy Guallpa RN  08/24/23 [address], RN  08/24/23 1260

## 2023-08-25 ENCOUNTER — HOSPITAL ENCOUNTER (OUTPATIENT)
Dept: CT IMAGING | Age: 68
End: 2023-08-25
Payer: MEDICARE

## 2023-08-25 DIAGNOSIS — R79.89 D-DIMER, ELEVATED: ICD-10-CM

## 2023-08-25 PROCEDURE — 6360000004 HC RX CONTRAST MEDICATION

## 2023-08-25 PROCEDURE — 2580000003 HC RX 258

## 2023-08-25 PROCEDURE — 71260 CT THORAX DX C+: CPT

## 2023-08-25 RX ORDER — 0.9 % SODIUM CHLORIDE 0.9 %
100 INTRAVENOUS SOLUTION INTRAVENOUS ONCE
Status: DISCONTINUED | OUTPATIENT
Start: 2023-08-25 | End: 2023-08-28 | Stop reason: HOSPADM

## 2023-08-25 RX ORDER — SODIUM CHLORIDE 0.9 % (FLUSH) 0.9 %
10 SYRINGE (ML) INJECTION PRN
Status: DISCONTINUED | OUTPATIENT
Start: 2023-08-25 | End: 2023-08-28 | Stop reason: HOSPADM

## 2023-08-25 RX ADMIN — Medication 80 ML: at 15:33

## 2023-08-25 RX ADMIN — IOPAMIDOL 75 ML: 755 INJECTION, SOLUTION INTRAVENOUS at 15:33

## 2023-08-25 RX ADMIN — SODIUM CHLORIDE, PRESERVATIVE FREE 10 ML: 5 INJECTION INTRAVENOUS at 15:34

## 2023-09-21 ENCOUNTER — TRANSCRIBE ORDERS (OUTPATIENT)
Dept: ADMINISTRATIVE | Age: 68
End: 2023-09-21

## 2023-09-21 DIAGNOSIS — N63.20 MASS OF LEFT BREAST, UNSPECIFIED QUADRANT: Primary | ICD-10-CM

## 2023-09-21 DIAGNOSIS — N63.13 BREAST LUMP ON RIGHT SIDE AT 7 O'CLOCK POSITION: ICD-10-CM

## 2023-10-18 ENCOUNTER — HOSPITAL ENCOUNTER (OUTPATIENT)
Dept: MAMMOGRAPHY | Age: 68
Discharge: HOME | End: 2023-10-20
Payer: MEDICARE

## 2023-10-18 ENCOUNTER — HOSPITAL ENCOUNTER (OUTPATIENT)
Dept: ULTRASOUND IMAGING | Age: 68
Discharge: HOME | End: 2023-10-20
Payer: MEDICARE

## 2023-10-18 DIAGNOSIS — N63.20 MASS OF LEFT BREAST, UNSPECIFIED QUADRANT: ICD-10-CM

## 2023-10-18 DIAGNOSIS — N63.13 BREAST LUMP ON RIGHT SIDE AT 7 O'CLOCK POSITION: ICD-10-CM

## 2023-10-18 PROCEDURE — 76642 ULTRASOUND BREAST LIMITED: CPT

## 2023-10-18 PROCEDURE — G0279 TOMOSYNTHESIS, MAMMO: HCPCS

## 2024-03-22 ENCOUNTER — HOSPITAL ENCOUNTER (OUTPATIENT)
Dept: GENERAL RADIOLOGY | Age: 69
End: 2024-03-22
Payer: MEDICARE

## 2024-03-22 ENCOUNTER — HOSPITAL ENCOUNTER (OUTPATIENT)
Age: 69
End: 2024-03-22
Payer: MEDICARE

## 2024-03-22 DIAGNOSIS — M54.50 LOW BACK PAIN, UNSPECIFIED BACK PAIN LATERALITY, UNSPECIFIED CHRONICITY, UNSPECIFIED WHETHER SCIATICA PRESENT: ICD-10-CM

## 2024-03-22 DIAGNOSIS — M25.511 RIGHT SHOULDER PAIN, UNSPECIFIED CHRONICITY: ICD-10-CM

## 2024-03-22 PROCEDURE — 73030 X-RAY EXAM OF SHOULDER: CPT

## 2024-03-22 PROCEDURE — 72114 X-RAY EXAM L-S SPINE BENDING: CPT

## 2024-04-03 ENCOUNTER — TELEPHONE (OUTPATIENT)
Dept: ONCOLOGY | Age: 69
End: 2024-04-03

## 2024-04-17 ENCOUNTER — HOSPITAL ENCOUNTER (OUTPATIENT)
Dept: MRI IMAGING | Age: 69
Discharge: HOME | End: 2024-04-19
Attending: PAIN MEDICINE
Payer: COMMERCIAL

## 2024-04-17 DIAGNOSIS — M54.51 VERTEBROGENIC LOW BACK PAIN: ICD-10-CM

## 2024-04-17 DIAGNOSIS — M96.1 POSTLAMINECTOMY SYNDROME, CERVICAL REGION: ICD-10-CM

## 2024-04-17 DIAGNOSIS — M47.817 LUMBOSACRAL SPONDYLOSIS WITHOUT MYELOPATHY: ICD-10-CM

## 2024-04-17 DIAGNOSIS — M54.17 LUMBOSACRAL NEURITIS: ICD-10-CM

## 2024-04-17 LAB
CREAT SERPL-MCNC: 1 MG/DL (ref 0.5–0.9)
GFR SERPL CREATININE-BSD FRML MDRD: 61 ML/MIN/1.73M2

## 2024-04-17 PROCEDURE — 82565 ASSAY OF CREATININE: CPT

## 2024-04-17 PROCEDURE — 36415 COLL VENOUS BLD VENIPUNCTURE: CPT

## 2024-04-17 PROCEDURE — 72158 MRI LUMBAR SPINE W/O & W/DYE: CPT

## 2024-04-17 PROCEDURE — 6360000004 HC RX CONTRAST MEDICATION: Performed by: PAIN MEDICINE

## 2024-04-17 PROCEDURE — A9579 GAD-BASE MR CONTRAST NOS,1ML: HCPCS | Performed by: PAIN MEDICINE

## 2024-04-17 RX ADMIN — GADOTERIDOL 15 ML: 279.3 INJECTION, SOLUTION INTRAVENOUS at 13:53

## 2024-05-13 NOTE — TELEPHONE ENCOUNTER
Pharmacy called for clarification, pt has been getting Spiriva from pulmonologist but they haven't seen her since June of 2018. You ordered Bevespi in November of 2018. Pharmacy wants to know which one patient should be taking.   Please advise Spontaneous

## 2024-05-22 ENCOUNTER — HOSPITAL ENCOUNTER (OUTPATIENT)
Age: 69
Setting detail: SPECIMEN
Discharge: HOME | End: 2024-05-22

## 2024-05-22 LAB
25(OH)D3 SERPL-MCNC: 28.4 NG/ML (ref 30–100)
ALBUMIN SERPL-MCNC: 4.3 G/DL (ref 3.5–5.2)
ALBUMIN/GLOB SERPL: 2 {RATIO} (ref 1–2.5)
ALP SERPL-CCNC: 77 U/L (ref 35–104)
ALT SERPL-CCNC: 18 U/L (ref 10–35)
ANION GAP SERPL CALCULATED.3IONS-SCNC: 11 MMOL/L (ref 9–16)
AST SERPL-CCNC: 22 U/L (ref 10–35)
BASOPHILS # BLD: 0.08 K/UL (ref 0–0.2)
BASOPHILS NFR BLD: 1 % (ref 0–2)
BILIRUB DIRECT SERPL-MCNC: <0.2 MG/DL (ref 0–0.3)
BILIRUB INDIRECT SERPL-MCNC: ABNORMAL MG/DL (ref 0–1)
BILIRUB SERPL-MCNC: <0.2 MG/DL (ref 0–1.2)
BUN SERPL-MCNC: 24 MG/DL (ref 8–23)
CALCIUM SERPL-MCNC: 9.2 MG/DL (ref 8.6–10.4)
CHLORIDE SERPL-SCNC: 104 MMOL/L (ref 98–107)
CHOLEST SERPL-MCNC: 211 MG/DL (ref 0–199)
CHOLESTEROL/HDL RATIO: 3
CO2 SERPL-SCNC: 26 MMOL/L (ref 20–31)
CREAT SERPL-MCNC: 1.2 MG/DL (ref 0.5–0.9)
EOSINOPHIL # BLD: 0.42 K/UL (ref 0–0.44)
EOSINOPHILS RELATIVE PERCENT: 7 % (ref 1–4)
ERYTHROCYTE [DISTWIDTH] IN BLOOD BY AUTOMATED COUNT: 14 % (ref 11.8–14.4)
GFR, ESTIMATED: 47 ML/MIN/1.73M2
GLUCOSE SERPL-MCNC: 103 MG/DL (ref 74–99)
HCT VFR BLD AUTO: 34.7 % (ref 36.3–47.1)
HDLC SERPL-MCNC: 65 MG/DL
HGB BLD-MCNC: 10.7 G/DL (ref 11.9–15.1)
IMM GRANULOCYTES # BLD AUTO: 0.03 K/UL (ref 0–0.3)
IMM GRANULOCYTES NFR BLD: 1 %
IRON SERPL-MCNC: 51 UG/DL (ref 37–145)
LDLC SERPL CALC-MCNC: 104 MG/DL (ref 0–100)
LYMPHOCYTES NFR BLD: 1.93 K/UL (ref 1.1–3.7)
LYMPHOCYTES RELATIVE PERCENT: 34 % (ref 24–43)
MCH RBC QN AUTO: 28.8 PG (ref 25.2–33.5)
MCHC RBC AUTO-ENTMCNC: 30.8 G/DL (ref 28.4–34.8)
MCV RBC AUTO: 93.3 FL (ref 82.6–102.9)
MONOCYTES NFR BLD: 0.54 K/UL (ref 0.1–1.2)
MONOCYTES NFR BLD: 9 % (ref 3–12)
NEUTROPHILS NFR BLD: 48 % (ref 36–65)
NEUTS SEG NFR BLD: 2.74 K/UL (ref 1.5–8.1)
NRBC BLD-RTO: 0 PER 100 WBC
PLATELET # BLD AUTO: 272 K/UL (ref 138–453)
PMV BLD AUTO: 10.1 FL (ref 8.1–13.5)
POTASSIUM SERPL-SCNC: 4.1 MMOL/L (ref 3.7–5.3)
PROT SERPL-MCNC: 7.1 G/DL (ref 6.6–8.7)
RBC # BLD AUTO: 3.72 M/UL (ref 3.95–5.11)
SODIUM SERPL-SCNC: 141 MMOL/L (ref 136–145)
TRIGL SERPL-MCNC: 208 MG/DL (ref 0–149)
TSH SERPL DL<=0.05 MIU/L-ACNC: 1.71 UIU/ML (ref 0.27–4.2)
VLDLC SERPL CALC-MCNC: 42 MG/DL
WBC OTHER # BLD: 5.7 K/UL (ref 3.5–11.3)

## 2024-06-08 ENCOUNTER — TRANSCRIBE ORDERS (OUTPATIENT)
Dept: ADMINISTRATIVE | Age: 69
End: 2024-06-08

## 2024-06-08 DIAGNOSIS — Z78.0 POSTMENOPAUSAL: Primary | ICD-10-CM

## 2024-07-11 ENCOUNTER — HOSPITAL ENCOUNTER (OUTPATIENT)
Dept: MAMMOGRAPHY | Age: 69
Discharge: HOME | End: 2024-07-13
Attending: FAMILY MEDICINE
Payer: COMMERCIAL

## 2024-07-11 ENCOUNTER — HOSPITAL ENCOUNTER (OUTPATIENT)
Dept: VASCULAR LAB | Age: 69
Discharge: HOME | End: 2024-07-13
Attending: FAMILY MEDICINE
Payer: COMMERCIAL

## 2024-07-11 DIAGNOSIS — Z78.0 POSTMENOPAUSAL: ICD-10-CM

## 2024-07-11 DIAGNOSIS — I65.21 STENOSIS OF RIGHT CAROTID ARTERY: ICD-10-CM

## 2024-07-11 DIAGNOSIS — R42 DIZZINESS: ICD-10-CM

## 2024-07-11 PROCEDURE — 93880 EXTRACRANIAL BILAT STUDY: CPT

## 2024-07-11 PROCEDURE — 77080 DXA BONE DENSITY AXIAL: CPT

## 2024-07-12 LAB
VAS LEFT BULB EDV: 23.1 CM/S
VAS LEFT BULB PSV: 97.9 CM/S
VAS LEFT CCA DIST EDV: 30.1 CM/S
VAS LEFT CCA DIST PSV: 97.8 CM/S
VAS LEFT CCA PROX EDV: 37 CM/S
VAS LEFT CCA PROX PSV: 111.6 CM/S
VAS LEFT ECA EDV: 19.23 CM/S
VAS LEFT ECA PSV: 194.8 CM/S
VAS LEFT ICA DIST EDV: 17.6 CM/S
VAS LEFT ICA DIST PSV: 66.2 CM/S
VAS LEFT ICA PROX EDV: 35.9 CM/S
VAS LEFT ICA PROX PSV: 97.2 CM/S
VAS LEFT ICA/CCA PSV: 1 NO UNITS
VAS LEFT VERTEBRAL EDV: 20.87 CM/S
VAS LEFT VERTEBRAL PSV: 58.1 CM/S
VAS RIGHT BULB EDV: 7.7 CM/S
VAS RIGHT BULB PSV: 38.9 CM/S
VAS RIGHT CCA DIST EDV: 18.1 CM/S
VAS RIGHT CCA DIST PSV: 75.2 CM/S
VAS RIGHT CCA PROX EDV: 18 CM/S
VAS RIGHT CCA PROX PSV: 81.5 CM/S
VAS RIGHT ECA EDV: 11.34 CM/S
VAS RIGHT ECA PSV: 257.8 CM/S
VAS RIGHT ICA DIST EDV: 31.4 CM/S
VAS RIGHT ICA DIST PSV: 98.4 CM/S
VAS RIGHT ICA PROX EDV: 14.1 CM/S
VAS RIGHT ICA PROX PSV: 54.3 CM/S
VAS RIGHT ICA/CCA PSV: 1.3 NO UNITS
VAS RIGHT VERTEBRAL EDV: 18.02 CM/S
VAS RIGHT VERTEBRAL PSV: 37.5 CM/S

## 2024-07-18 ENCOUNTER — HOSPITAL ENCOUNTER (OUTPATIENT)
Age: 69
Setting detail: SPECIMEN
Discharge: HOME | End: 2024-07-18

## 2024-07-18 LAB
ALBUMIN SERPL-MCNC: 4.5 G/DL (ref 3.5–5.2)
ALBUMIN/GLOB SERPL: 1 {RATIO} (ref 1–2.5)
ALP SERPL-CCNC: 89 U/L (ref 35–104)
ALT SERPL-CCNC: 20 U/L (ref 10–35)
ANION GAP SERPL CALCULATED.3IONS-SCNC: 15 MMOL/L (ref 9–16)
AST SERPL-CCNC: 21 U/L (ref 10–35)
BASOPHILS # BLD: 0.11 K/UL (ref 0–0.2)
BASOPHILS NFR BLD: 1 % (ref 0–2)
BILIRUB DIRECT SERPL-MCNC: <0.2 MG/DL (ref 0–0.2)
BILIRUB INDIRECT SERPL-MCNC: ABNORMAL MG/DL (ref 0–1)
BILIRUB SERPL-MCNC: 0.2 MG/DL (ref 0–1.2)
BUN SERPL-MCNC: 26 MG/DL (ref 8–23)
CALCIUM SERPL-MCNC: 9.3 MG/DL (ref 8.6–10.4)
CHLORIDE SERPL-SCNC: 99 MMOL/L (ref 98–107)
CO2 SERPL-SCNC: 21 MMOL/L (ref 20–31)
CREAT SERPL-MCNC: 1.7 MG/DL (ref 0.5–0.9)
EOSINOPHIL # BLD: 0.5 K/UL (ref 0–0.44)
EOSINOPHILS RELATIVE PERCENT: 7 % (ref 1–4)
ERYTHROCYTE [DISTWIDTH] IN BLOOD BY AUTOMATED COUNT: 13.1 % (ref 11.8–14.4)
FOLATE SERPL-MCNC: 6.6 NG/ML (ref 4.8–24.2)
GFR, ESTIMATED: 32 ML/MIN/1.73M2
GLUCOSE SERPL-MCNC: 66 MG/DL (ref 74–99)
HCT VFR BLD AUTO: 40 % (ref 36.3–47.1)
HCV AB SERPL QL IA: REACTIVE
HGB BLD-MCNC: 12.5 G/DL (ref 11.9–15.1)
IMM GRANULOCYTES # BLD AUTO: 0.1 K/UL (ref 0–0.3)
IMM GRANULOCYTES NFR BLD: 1 %
LYMPHOCYTES NFR BLD: 1.87 K/UL (ref 1.1–3.7)
LYMPHOCYTES RELATIVE PERCENT: 25 % (ref 24–43)
MCH RBC QN AUTO: 28.7 PG (ref 25.2–33.5)
MCHC RBC AUTO-ENTMCNC: 31.3 G/DL (ref 28.4–34.8)
MCV RBC AUTO: 91.7 FL (ref 82.6–102.9)
MONOCYTES NFR BLD: 0.83 K/UL (ref 0.1–1.2)
MONOCYTES NFR BLD: 11 % (ref 3–12)
NEUTROPHILS NFR BLD: 55 % (ref 36–65)
NEUTS SEG NFR BLD: 4.22 K/UL (ref 1.5–8.1)
NRBC BLD-RTO: 0 PER 100 WBC
PLATELET # BLD AUTO: 299 K/UL (ref 138–453)
PMV BLD AUTO: 9.6 FL (ref 8.1–13.5)
POTASSIUM SERPL-SCNC: 4.7 MMOL/L (ref 3.7–5.3)
PROT SERPL-MCNC: 7.6 G/DL (ref 6.6–8.7)
RBC # BLD AUTO: 4.36 M/UL (ref 3.95–5.11)
SODIUM SERPL-SCNC: 135 MMOL/L (ref 136–145)
VIT B12 SERPL-MCNC: 479 PG/ML (ref 232–1245)
WBC OTHER # BLD: 7.6 K/UL (ref 3.5–11.3)

## 2024-07-29 ENCOUNTER — APPOINTMENT (OUTPATIENT)
Dept: GENERAL RADIOLOGY | Age: 69
End: 2024-07-29
Payer: MEDICARE

## 2024-07-29 ENCOUNTER — HOSPITAL ENCOUNTER (EMERGENCY)
Age: 69
Discharge: HOME | End: 2024-07-29
Attending: EMERGENCY MEDICINE
Payer: MEDICARE

## 2024-07-29 VITALS
OXYGEN SATURATION: 92 % | RESPIRATION RATE: 18 BRPM | BODY MASS INDEX: 31.28 KG/M2 | DIASTOLIC BLOOD PRESSURE: 94 MMHG | WEIGHT: 170 LBS | SYSTOLIC BLOOD PRESSURE: 150 MMHG | HEIGHT: 62 IN | TEMPERATURE: 98.1 F | HEART RATE: 68 BPM

## 2024-07-29 DIAGNOSIS — B34.9 VIRAL ILLNESS: Primary | ICD-10-CM

## 2024-07-29 LAB
ALBUMIN SERPL-MCNC: 4 G/DL (ref 3.5–5.2)
ALP SERPL-CCNC: 88 U/L (ref 35–104)
ALT SERPL-CCNC: 13 U/L (ref 5–33)
ANION GAP SERPL CALCULATED.3IONS-SCNC: 11 MMOL/L (ref 9–17)
AST SERPL-CCNC: 13 U/L
BASOPHILS # BLD: 0.06 K/UL (ref 0–0.2)
BASOPHILS NFR BLD: 1 % (ref 0–2)
BILIRUB SERPL-MCNC: 0.2 MG/DL (ref 0.3–1.2)
BUN SERPL-MCNC: 13 MG/DL (ref 8–23)
BUN/CREAT SERPL: 11 (ref 9–20)
CALCIUM SERPL-MCNC: 9.1 MG/DL (ref 8.6–10.4)
CHLORIDE SERPL-SCNC: 97 MMOL/L (ref 98–107)
CO2 SERPL-SCNC: 28 MMOL/L (ref 20–31)
CREAT SERPL-MCNC: 1.2 MG/DL (ref 0.5–0.9)
EOSINOPHIL # BLD: 0.13 K/UL (ref 0–0.44)
EOSINOPHILS RELATIVE PERCENT: 2 % (ref 1–4)
ERYTHROCYTE [DISTWIDTH] IN BLOOD BY AUTOMATED COUNT: 12.5 % (ref 11.8–14.4)
FLUAV RNA RESP QL NAA+PROBE: NOT DETECTED
FLUBV RNA RESP QL NAA+PROBE: NOT DETECTED
GFR, ESTIMATED: 49 ML/MIN/1.73M2
GLUCOSE SERPL-MCNC: 102 MG/DL (ref 70–99)
HCT VFR BLD AUTO: 35.3 % (ref 36.3–47.1)
HGB BLD-MCNC: 11.6 G/DL (ref 11.9–15.1)
IMM GRANULOCYTES # BLD AUTO: 0.03 K/UL (ref 0–0.3)
IMM GRANULOCYTES NFR BLD: 1 %
LIPASE SERPL-CCNC: 34 U/L (ref 13–60)
LYMPHOCYTES NFR BLD: 1.15 K/UL (ref 1.1–3.7)
LYMPHOCYTES RELATIVE PERCENT: 17 % (ref 24–43)
MCH RBC QN AUTO: 29.7 PG (ref 25.2–33.5)
MCHC RBC AUTO-ENTMCNC: 32.9 G/DL (ref 28.4–34.8)
MCV RBC AUTO: 90.5 FL (ref 82.6–102.9)
MONOCYTES NFR BLD: 0.7 K/UL (ref 0.1–1.2)
MONOCYTES NFR BLD: 11 % (ref 3–12)
NEUTROPHILS NFR BLD: 68 % (ref 36–65)
NEUTS SEG NFR BLD: 4.59 K/UL (ref 1.5–8.1)
NRBC BLD-RTO: 0 PER 100 WBC
PLATELET # BLD AUTO: 294 K/UL (ref 138–453)
PMV BLD AUTO: 9.2 FL (ref 8.1–13.5)
POTASSIUM SERPL-SCNC: 4 MMOL/L (ref 3.7–5.3)
PROT SERPL-MCNC: 7.6 G/DL (ref 6.4–8.3)
RBC # BLD AUTO: 3.9 M/UL (ref 3.95–5.11)
SARS-COV-2 RNA RESP QL NAA+PROBE: NOT DETECTED
SODIUM SERPL-SCNC: 136 MMOL/L (ref 135–144)
SOURCE: NORMAL
SPECIMEN DESCRIPTION: NORMAL
WBC OTHER # BLD: 6.7 K/UL (ref 3.5–11.3)

## 2024-07-29 PROCEDURE — 83690 ASSAY OF LIPASE: CPT

## 2024-07-29 PROCEDURE — 96374 THER/PROPH/DIAG INJ IV PUSH: CPT

## 2024-07-29 PROCEDURE — 85025 COMPLETE CBC W/AUTO DIFF WBC: CPT

## 2024-07-29 PROCEDURE — 6360000002 HC RX W HCPCS: Performed by: PHYSICIAN ASSISTANT

## 2024-07-29 PROCEDURE — 2580000003 HC RX 258: Performed by: PHYSICIAN ASSISTANT

## 2024-07-29 PROCEDURE — 87636 SARSCOV2 & INF A&B AMP PRB: CPT

## 2024-07-29 PROCEDURE — 99284 EMERGENCY DEPT VISIT MOD MDM: CPT

## 2024-07-29 PROCEDURE — 80053 COMPREHEN METABOLIC PANEL: CPT

## 2024-07-29 PROCEDURE — 71045 X-RAY EXAM CHEST 1 VIEW: CPT

## 2024-07-29 RX ORDER — ONDANSETRON 4 MG/1
4 TABLET, ORALLY DISINTEGRATING ORAL EVERY 8 HOURS PRN
Qty: 21 TABLET | Refills: 0 | Status: SHIPPED | OUTPATIENT
Start: 2024-07-29 | End: 2024-08-05

## 2024-07-29 RX ORDER — BENZONATATE 100 MG/1
100 CAPSULE ORAL 3 TIMES DAILY PRN
Qty: 30 CAPSULE | Refills: 0 | Status: SHIPPED | OUTPATIENT
Start: 2024-07-29 | End: 2024-08-05

## 2024-07-29 RX ORDER — 0.9 % SODIUM CHLORIDE 0.9 %
1000 INTRAVENOUS SOLUTION INTRAVENOUS ONCE
Status: COMPLETED | OUTPATIENT
Start: 2024-07-29 | End: 2024-07-29

## 2024-07-29 RX ORDER — ONDANSETRON 2 MG/ML
4 INJECTION INTRAMUSCULAR; INTRAVENOUS ONCE
Status: COMPLETED | OUTPATIENT
Start: 2024-07-29 | End: 2024-07-29

## 2024-07-29 RX ADMIN — SODIUM CHLORIDE 1000 ML: 9 INJECTION, SOLUTION INTRAVENOUS at 13:45

## 2024-07-29 RX ADMIN — ONDANSETRON 4 MG: 2 INJECTION INTRAMUSCULAR; INTRAVENOUS at 13:45

## 2024-07-29 ASSESSMENT — PAIN SCALES - GENERAL: PAINLEVEL_OUTOF10: 4

## 2024-07-29 ASSESSMENT — PAIN - FUNCTIONAL ASSESSMENT: PAIN_FUNCTIONAL_ASSESSMENT: 0-10

## 2024-07-29 ASSESSMENT — PAIN DESCRIPTION - LOCATION: LOCATION: GENERALIZED

## 2024-07-29 ASSESSMENT — PAIN DESCRIPTION - DESCRIPTORS: DESCRIPTORS: ACHING

## 2024-07-29 NOTE — ED PROVIDER NOTES
eMERGENCY dEPARTMENT eNCOUnter   Independent Attestation     Pt Name: Diya Haynes  MRN: [id number]  Birthdate 1955  Date of evaluation: 7/29/24     Diya Haynes is a 69 y.o. female with CC: Shortness of Breath, Cough, and Generalized Body Aches (Pt reports cough/SOB/body aches for the past 5 days.)      Based on the medical record the care appears appropriate.  I was personally available for consultation in the Emergency Department.    Naif Bowen DO  Attending Emergency Physician                  Naif Bowen DO  07/29/24 0477

## 2024-07-29 NOTE — ED PROVIDER NOTES
[facility] ED  eMERGENCY dEPARTMENTeNCOUnter      Pt Name: Diya Haynes  MRN: [id number]  Birthdate 1955  Date ofevaluation: 7/29/2024  Provider: Kings Dumont PA-C    CHIEF COMPLAINT       Chief Complaint   Patient presents with    Shortness of Breath    Cough    Generalized Body Aches     Pt reports cough/SOB/body aches for the past 5 days.         HISTORY OF PRESENT ILLNESS  (Location/Symptom, Timing/Onset, Context/Setting, Quality, Duration, Modifying Factors, Severity.)   Diya Haynes is a 69 y.o. female who presents to the emergency department with chills cough somewhat productive body aches soreness along with diarrhea and some nausea for the last 4 to 5 days.  Denies any blood or black stools.      Nursing Notes were reviewed.    ALLERGIES     Patient has no known allergies.    CURRENT MEDICATIONS       Previous Medications    ALPRAZOLAM (XANAX) 1 MG TABLET    Take by mouth as needed .    ASPIRIN 81 MG EC TABLET    Take 1 tablet by mouth daily    BEVESPI AEROSPHERE 9-4.8 MCG/ACT AERO    INHALE 2 PUFFS BY MOUTH INTO THE LUNGS TWICE DAILY    BUMETANIDE (BUMEX) 0.5 MG TABLET    Take 1 tablet by mouth daily as needed    BUPROPION (WELLBUTRIN XL) 300 MG EXTENDED RELEASE TABLET    Take 1 tablet by mouth every morning    CETIRIZINE (ZYRTEC) 10 MG TABLET    TAKE 1 TABLET BY MOUTH DAILY    CHOLECALCIFEROL (VITAMIN D3) 1000 UNITS TABS    TAKE 1 TABLET BY MOUTH DAILY    CLOPIDOGREL (PLAVIX) 75 MG TABLET    Take 1 tablet by mouth daily    COENZYME Q10 (COQ-10) 400 MG CAPS    Take 400 mg by mouth daily    DULOXETINE (CYMBALTA) 60 MG EXTENDED RELEASE CAPSULE    Take 1 capsule by mouth daily    FAMOTIDINE (PEPCID) 40 MG TABLET    Take 40 mg by mouth nightly     FERROUS SULFATE (IRON 325) 325 (65 FE) MG TABLET    Take 1 tablet by mouth daily (with breakfast)    FLOVENT  MCG/ACT INHALER    INHALE 2 PUFFS INTO THE LUNGS TWICE DAILY    GABAPENTIN (NEURONTIN) 400 MG CAPSULE    TAKE 1 CAPSULE BY MOUTH

## 2024-08-08 ENCOUNTER — HOSPITAL ENCOUNTER (OUTPATIENT)
Age: 69
Setting detail: SPECIMEN
Discharge: HOME | End: 2024-08-08

## 2024-08-08 LAB
BASOPHILS # BLD: 0.11 K/UL (ref 0–0.2)
BASOPHILS NFR BLD: 2 % (ref 0–2)
EOSINOPHIL # BLD: 0.42 K/UL (ref 0–0.44)
EOSINOPHILS RELATIVE PERCENT: 6 % (ref 1–4)
ERYTHROCYTE [DISTWIDTH] IN BLOOD BY AUTOMATED COUNT: 12.9 % (ref 11.8–14.4)
FERRITIN SERPL-MCNC: 161 NG/ML (ref 13–150)
HCT VFR BLD AUTO: 37 % (ref 36.3–47.1)
HGB BLD-MCNC: 12 G/DL (ref 11.9–15.1)
IMM GRANULOCYTES # BLD AUTO: 0.17 K/UL (ref 0–0.3)
IMM GRANULOCYTES NFR BLD: 2 %
IMM RETICS NFR: 16 % (ref 2.7–18.3)
IRON SERPL-MCNC: 43 UG/DL (ref 37–145)
LYMPHOCYTES NFR BLD: 1.46 K/UL (ref 1.1–3.7)
LYMPHOCYTES RELATIVE PERCENT: 21 % (ref 24–43)
MCH RBC QN AUTO: 28.8 PG (ref 25.2–33.5)
MCHC RBC AUTO-ENTMCNC: 32.4 G/DL (ref 28.4–34.8)
MCV RBC AUTO: 88.7 FL (ref 82.6–102.9)
MONOCYTES NFR BLD: 0.76 K/UL (ref 0.1–1.2)
MONOCYTES NFR BLD: 11 % (ref 3–12)
NEUTROPHILS NFR BLD: 58 % (ref 36–65)
NEUTS SEG NFR BLD: 4.05 K/UL (ref 1.5–8.1)
NRBC BLD-RTO: 0 PER 100 WBC
PLATELET # BLD AUTO: 440 K/UL (ref 138–453)
PMV BLD AUTO: 9.2 FL (ref 8.1–13.5)
RBC # BLD AUTO: 4.17 M/UL (ref 3.95–5.11)
RETIC HEMOGLOBIN: 28.1 PG (ref 28.2–35.7)
RETICS # AUTO: 0.09 M/UL (ref 0.03–0.08)
RETICS/RBC NFR AUTO: 2.1 % (ref 0.5–1.9)
WBC OTHER # BLD: 7 K/UL (ref 3.5–11.3)

## 2024-08-09 LAB
PATH REV BLD -IMP: NORMAL
SURGICAL PATHOLOGY REPORT: NORMAL

## 2024-08-12 LAB
HCV RNA # SERPL NAA+PROBE: NOT DETECTED {COPIES}/ML
SPECIMEN SOURCE: NORMAL

## 2024-08-28 ENCOUNTER — HOSPITAL ENCOUNTER (OUTPATIENT)
Dept: CT IMAGING | Age: 69
Discharge: HOME | End: 2024-08-30
Attending: FAMILY MEDICINE
Payer: MEDICARE

## 2024-08-28 DIAGNOSIS — K42.0: ICD-10-CM

## 2024-08-28 PROCEDURE — 2580000003 HC RX 258: Performed by: FAMILY MEDICINE

## 2024-08-28 PROCEDURE — 74177 CT ABD & PELVIS W/CONTRAST: CPT

## 2024-08-28 PROCEDURE — 2500000003 HC RX 250 WO HCPCS: Performed by: FAMILY MEDICINE

## 2024-08-28 PROCEDURE — 6360000004 HC RX CONTRAST MEDICATION: Performed by: FAMILY MEDICINE

## 2024-08-28 RX ORDER — IOPAMIDOL 755 MG/ML
75 INJECTION, SOLUTION INTRAVASCULAR
Status: COMPLETED | OUTPATIENT
Start: 2024-08-28 | End: 2024-08-28

## 2024-08-28 RX ORDER — SODIUM CHLORIDE 0.9 % (FLUSH) 0.9 %
10 SYRINGE (ML) INJECTION ONCE
Status: COMPLETED | OUTPATIENT
Start: 2024-08-28 | End: 2024-08-28

## 2024-08-28 RX ORDER — 0.9 % SODIUM CHLORIDE 0.9 %
80 INTRAVENOUS SOLUTION INTRAVENOUS ONCE
Status: COMPLETED | OUTPATIENT
Start: 2024-08-28 | End: 2024-08-28

## 2024-08-28 RX ADMIN — SODIUM CHLORIDE 80 ML: 9 INJECTION, SOLUTION INTRAVENOUS at 14:54

## 2024-08-28 RX ADMIN — IOPAMIDOL 75 ML: 755 INJECTION, SOLUTION INTRAVENOUS at 14:54

## 2024-08-28 RX ADMIN — BARIUM SULFATE 450 ML: 20 SUSPENSION ORAL at 14:53

## 2024-08-28 RX ADMIN — SODIUM CHLORIDE, PRESERVATIVE FREE 10 ML: 5 INJECTION INTRAVENOUS at 14:54

## 2025-05-27 ENCOUNTER — HOSPITAL ENCOUNTER (OUTPATIENT)
Age: 70
Setting detail: SPECIMEN
Discharge: HOME | End: 2025-05-27

## 2025-05-27 LAB
25(OH)D3 SERPL-MCNC: 33.3 NG/ML (ref 30–100)
ALBUMIN SERPL-MCNC: 4.4 G/DL (ref 3.5–5.2)
ALBUMIN/GLOB SERPL: 1.7 {RATIO} (ref 1–2.5)
ALP SERPL-CCNC: 75 U/L (ref 35–104)
ALT SERPL-CCNC: 25 U/L (ref 10–35)
ANION GAP SERPL CALCULATED.3IONS-SCNC: 12 MMOL/L (ref 9–16)
AST SERPL-CCNC: 22 U/L (ref 10–35)
BILIRUB SERPL-MCNC: 0.2 MG/DL (ref 0–1.2)
BUN SERPL-MCNC: 31 MG/DL (ref 8–23)
CALCIUM SERPL-MCNC: 9.5 MG/DL (ref 8.6–10.4)
CHLORIDE SERPL-SCNC: 102 MMOL/L (ref 98–107)
CHOLEST SERPL-MCNC: 262 MG/DL (ref 0–199)
CHOLESTEROL/HDL RATIO: 4.4
CO2 SERPL-SCNC: 25 MMOL/L (ref 20–31)
CREAT SERPL-MCNC: 1.3 MG/DL (ref 0.6–0.9)
ERYTHROCYTE [DISTWIDTH] IN BLOOD BY AUTOMATED COUNT: 13.3 % (ref 11.8–14.4)
GFR, ESTIMATED: 45 ML/MIN/1.73M2
GLUCOSE P FAST SERPL-MCNC: 89 MG/DL (ref 74–99)
HCT VFR BLD AUTO: 37.4 % (ref 36.3–47.1)
HDLC SERPL-MCNC: 59 MG/DL
HGB BLD-MCNC: 11.8 G/DL (ref 11.9–15.1)
IRON SERPL-MCNC: 62 UG/DL (ref 37–145)
LDLC SERPL CALC-MCNC: 147 MG/DL (ref 0–100)
MCH RBC QN AUTO: 28.5 PG (ref 25.2–33.5)
MCHC RBC AUTO-ENTMCNC: 31.6 G/DL (ref 28.4–34.8)
MCV RBC AUTO: 90.3 FL (ref 82.6–102.9)
NRBC BLD-RTO: 0 PER 100 WBC
PLATELET # BLD AUTO: 256 K/UL (ref 138–453)
PMV BLD AUTO: 9.9 FL (ref 8.1–13.5)
POTASSIUM SERPL-SCNC: 4.5 MMOL/L (ref 3.7–5.3)
PROT SERPL-MCNC: 7 G/DL (ref 6.6–8.7)
PTH-INTACT SERPL-MCNC: 67 PG/ML (ref 17.9–58.6)
RBC # BLD AUTO: 4.14 M/UL (ref 3.95–5.11)
SODIUM SERPL-SCNC: 139 MMOL/L (ref 136–145)
TRIGL SERPL-MCNC: 278 MG/DL (ref 0–149)
TSH SERPL DL<=0.05 MIU/L-ACNC: 1.97 UIU/ML (ref 0.27–4.2)
VLDLC SERPL CALC-MCNC: 56 MG/DL (ref 1–30)
WBC OTHER # BLD: 5.5 K/UL (ref 3.5–11.3)

## 2025-07-24 ENCOUNTER — HOSPITAL ENCOUNTER (OUTPATIENT)
Age: 70
Setting detail: SPECIMEN
Discharge: HOME | End: 2025-07-24

## 2025-07-24 LAB
ERYTHROCYTE [DISTWIDTH] IN BLOOD BY AUTOMATED COUNT: 13.1 % (ref 11.8–14.4)
HCT VFR BLD AUTO: 33.8 % (ref 36.3–47.1)
HGB BLD-MCNC: 10.7 G/DL (ref 11.9–15.1)
IRON SERPL-MCNC: 27 UG/DL (ref 37–145)
MCH RBC QN AUTO: 28.2 PG (ref 25.2–33.5)
MCHC RBC AUTO-ENTMCNC: 31.7 G/DL (ref 28.4–34.8)
MCV RBC AUTO: 88.9 FL (ref 82.6–102.9)
NRBC BLD-RTO: 0 PER 100 WBC
PLATELET # BLD AUTO: 344 K/UL (ref 138–453)
PMV BLD AUTO: 9.4 FL (ref 8.1–13.5)
RBC # BLD AUTO: 3.8 M/UL (ref 3.95–5.11)
WBC OTHER # BLD: 8.2 K/UL (ref 3.5–11.3)

## 2025-07-25 ENCOUNTER — TRANSCRIBE ORDERS (OUTPATIENT)
Dept: ADMINISTRATIVE | Age: 70
End: 2025-07-25

## 2025-07-25 DIAGNOSIS — Z12.31 ENCOUNTER FOR SCREENING MAMMOGRAM FOR MALIGNANT NEOPLASM OF BREAST: Primary | ICD-10-CM

## 2025-08-05 ENCOUNTER — TELEPHONE (OUTPATIENT)
Dept: GASTROENTEROLOGY | Age: 70
End: 2025-08-05

## (undated) DEVICE — SINGLE DOSE EPI TY

## (undated) DEVICE — CONTAINER,SPECIMEN,OR STERILE,4OZ: Brand: MEDLINE

## (undated) DEVICE — BANDAGE ADH W1XL3IN UNIV PLAS NAT GEN USE STRP

## (undated) DEVICE — 6 ML SYRINGE LUER-LOCK TIP: Brand: MONOJECT

## (undated) DEVICE — BLANKET WRM W40.2XL55.9IN IORT LO BODY + MISTRAL AIR

## (undated) DEVICE — 1010 S-DRAPE TOWEL DRAPE 10/BX: Brand: STERI-DRAPE™

## (undated) DEVICE — NEEDLE SPNL 22GA L3.5IN BLK HUB S STL REG WALL FIT STYL W/

## (undated) DEVICE — 9165 UNIVERSAL PATIENT PLATE: Brand: 3M™

## (undated) DEVICE — SET CATH 20GA L1.75IN RAD ART POLYUR RADPQ W/ INTEGR

## (undated) DEVICE — FORCEPS BX L240CM JAW DIA2.4MM ORNG L CAP W/ NDL DISP RAD

## (undated) DEVICE — 1 ML INSULIN SYRINGE LUER-LOCK WITH TIP CAP: Brand: MONOJECT

## (undated) DEVICE — SUTURE VCRL SZ 3-0 L54IN ABSRB UD LIGAPAK REEL POLYGLACTIN J285G

## (undated) DEVICE — GLOVE SURG SZ 75 L12IN FNGR THK87MIL WHT LTX FREE

## (undated) DEVICE — SUTURE PERMAHAND SZ 0 L30IN NONABSORBABLE BLK SILK BRAID A306H

## (undated) DEVICE — SINGLE SHOT EPIDURAL TRAY: Brand: MEDLINE INDUSTRIES, INC.

## (undated) DEVICE — ELECTRODE ES L3IN S STL BLDE INSUL DISP VALLEYLAB EDGE

## (undated) DEVICE — Z DISCONTINUED APPLICATOR SURG PREP 0.35OZ 2% CHG 70% ISO ALC W/ HI LT

## (undated) DEVICE — SUTURE MCRYL SZ 4-0 L27IN ABSRB UD L19MM PS-2 1/2 CIR PRIM Y426H

## (undated) DEVICE — BANDAGE ADH W2XL4IN NITRL FAB STRP CURAD

## (undated) DEVICE — TOWEL,OR,DSP,ST,BLUE,STD,4/PK,20PK/CS: Brand: MEDLINE

## (undated) DEVICE — GARMENT,MEDLINE,DVT,INT,CALF,MED, GEN2: Brand: MEDLINE

## (undated) DEVICE — NEEDLE SPNL L4.5IN OD22GA QNCKE TYP SPINOCAN

## (undated) DEVICE — NEEDLE SPNL 25GA L3.5IN BLU HUB S STL REG WALL FIT STYL W/

## (undated) DEVICE — SUTURE NONABSORBABLE MONOFILAMENT 6-0 BV-1 1X30 IN PROLENE 8709H

## (undated) DEVICE — DRAPE THYROID

## (undated) DEVICE — SURGIFOAM SPNG SZ 100

## (undated) DEVICE — NEEDLE SPINAL 22GA L3.5IN SPINOCAN

## (undated) DEVICE — CURVED SHARP RF CANNULA, RADIOPAQUE MARKER: Brand: RADIOPAQUE RADIOFREQUENCY CANNULA

## (undated) DEVICE — CONVERTED USE 248063 TOWELS OR BL ST

## (undated) DEVICE — Device

## (undated) DEVICE — APPLICATOR MEDICATED 26 CC SOLUTION HI LT ORNG CHLORAPREP

## (undated) DEVICE — GLOVE SURG SZ 7 L12IN FNGR THK87MIL WHT LTX FREE

## (undated) DEVICE — 450 ML BOTTLE OF 0.05% CHLORHEXIDINE GLUCONATE IN 99.95% STERILE WATER FOR IRRIGATION, USP AND APPLICATOR.: Brand: IRRISEPT ANTIMICROBIAL WOUND LAVAGE

## (undated) DEVICE — BASIN EMSIS 700ML GRAPHITE PLAS KID SHP GRAD

## (undated) DEVICE — SUTURE VCRL SZ 3-0 L27IN ABSRB UD L26MM SH 1/2 CIR J416H

## (undated) DEVICE — SUTURE PROL SZ 6-0 L30IN NONABSORBABLE BLU L13MM RB-2 1/2 8711H

## (undated) DEVICE — PRESSURE MONITORING SET: Brand: TRUWAVE

## (undated) DEVICE — NEEDLE SPNL L3.5IN DIA25GA QNCKE TYP BVL SPINOCAN

## (undated) DEVICE — DRAPE,REIN 53X77,STERILE: Brand: MEDLINE

## (undated) DEVICE — GAUZE,SPONGE,4"X4",16PLY,STRL,LF,10/TRAY: Brand: MEDLINE

## (undated) DEVICE — BLOCK BITE 60FR RUBBER ADLT DENTAL

## (undated) DEVICE — SOLUTION IV 500ML 0.9% SOD CHL PH 5 INJ USP VIAFLX PLAS

## (undated) DEVICE — SYRINGE, LUER LOCK, 10ML: Brand: MEDLINE

## (undated) DEVICE — NEEDLE SPNL 25GA L4 11/16IN BLU HUB DISP

## (undated) DEVICE — PAD ELECTROSURG HRVSTR CORD PATIENT

## (undated) DEVICE — GLOVE SURG SZ 8 L11.77IN FNGR THK9.8MIL STRW LTX POLYMER

## (undated) DEVICE — 3M(TM) MEDIPORE(TM) +PAD SOFT CLOTH ADHESIVE WOUND DRESSING 3569: Brand: 3M™ MEDIPORE™

## (undated) DEVICE — SOLUTION PREP 4OZ 3% H PEROX 1ST AID ANTISEP ORAL DEBRIDING

## (undated) DEVICE — ADHESIVE SKIN CLSR 0.7ML TOP DERMBND ADV

## (undated) DEVICE — JELLY,LUBE,STERILE,FLIP TOP,TUBE,2-OZ: Brand: MEDLINE

## (undated) DEVICE — POSITIONER HD W8XH4XL8.5IN RASPBERRY FOAM SLT